# Patient Record
Sex: MALE | Race: BLACK OR AFRICAN AMERICAN | NOT HISPANIC OR LATINO | Employment: FULL TIME | URBAN - METROPOLITAN AREA
[De-identification: names, ages, dates, MRNs, and addresses within clinical notes are randomized per-mention and may not be internally consistent; named-entity substitution may affect disease eponyms.]

---

## 2017-11-06 ENCOUNTER — OFFICE VISIT (OUTPATIENT)
Dept: FAMILY MEDICINE | Facility: CLINIC | Age: 32
End: 2017-11-06
Payer: COMMERCIAL

## 2017-11-06 ENCOUNTER — LAB VISIT (OUTPATIENT)
Dept: LAB | Facility: HOSPITAL | Age: 32
End: 2017-11-06
Attending: FAMILY MEDICINE
Payer: COMMERCIAL

## 2017-11-06 VITALS
SYSTOLIC BLOOD PRESSURE: 110 MMHG | HEIGHT: 68 IN | OXYGEN SATURATION: 97 % | TEMPERATURE: 98 F | DIASTOLIC BLOOD PRESSURE: 80 MMHG | BODY MASS INDEX: 36.86 KG/M2 | WEIGHT: 243.19 LBS | RESPIRATION RATE: 16 BRPM | HEART RATE: 61 BPM

## 2017-11-06 DIAGNOSIS — R39.12 WEAK URINE STREAM: ICD-10-CM

## 2017-11-06 DIAGNOSIS — Z00.00 WELL ADULT EXAM: ICD-10-CM

## 2017-11-06 DIAGNOSIS — Z00.00 WELL ADULT EXAM: Primary | ICD-10-CM

## 2017-11-06 LAB
ALBUMIN SERPL BCP-MCNC: 3.4 G/DL
ALP SERPL-CCNC: 102 U/L
ALT SERPL W/O P-5'-P-CCNC: 19 U/L
ANION GAP SERPL CALC-SCNC: 7 MMOL/L
AST SERPL-CCNC: 34 U/L
BASOPHILS # BLD AUTO: 0.01 K/UL
BASOPHILS NFR BLD: 0.1 %
BILIRUB SERPL-MCNC: 1 MG/DL
BILIRUB SERPL-MCNC: NORMAL MG/DL
BILIRUB UR QL STRIP: NEGATIVE
BLOOD URINE, POC: NORMAL
BUN SERPL-MCNC: 11 MG/DL
CALCIUM SERPL-MCNC: 9 MG/DL
CHLORIDE SERPL-SCNC: 105 MMOL/L
CHOLEST SERPL-MCNC: 176 MG/DL
CHOLEST/HDLC SERPL: 3 {RATIO}
CLARITY UR REFRACT.AUTO: CLEAR
CO2 SERPL-SCNC: 30 MMOL/L
COLOR UR AUTO: YELLOW
COLOR, POC UA: NORMAL
COMPLEXED PSA SERPL-MCNC: 0.58 NG/ML
CREAT SERPL-MCNC: 1.2 MG/DL
DIFFERENTIAL METHOD: NORMAL
EOSINOPHIL # BLD AUTO: 0.1 K/UL
EOSINOPHIL NFR BLD: 0.6 %
ERYTHROCYTE [DISTWIDTH] IN BLOOD BY AUTOMATED COUNT: 12.7 %
EST. GFR  (AFRICAN AMERICAN): >60 ML/MIN/1.73 M^2
EST. GFR  (NON AFRICAN AMERICAN): >60 ML/MIN/1.73 M^2
ESTIMATED AVG GLUCOSE: 97 MG/DL
GLUCOSE SERPL-MCNC: 68 MG/DL
GLUCOSE UR QL STRIP: NEGATIVE
GLUCOSE UR QL STRIP: NORMAL
HBA1C MFR BLD HPLC: 5 %
HCT VFR BLD AUTO: 41.1 %
HDLC SERPL-MCNC: 58 MG/DL
HDLC SERPL: 33 %
HGB BLD-MCNC: 14 G/DL
HGB UR QL STRIP: NEGATIVE
IMM GRANULOCYTES # BLD AUTO: 0.03 K/UL
IMM GRANULOCYTES NFR BLD AUTO: 0.3 %
KETONES UR QL STRIP: NEGATIVE
KETONES UR QL STRIP: NORMAL
LDLC SERPL CALC-MCNC: 110.4 MG/DL
LEUKOCYTE ESTERASE UR QL STRIP: NEGATIVE
LEUKOCYTE ESTERASE URINE, POC: NORMAL
LYMPHOCYTES # BLD AUTO: 3.1 K/UL
LYMPHOCYTES NFR BLD: 32.4 %
MCH RBC QN AUTO: 30.4 PG
MCHC RBC AUTO-ENTMCNC: 34.1 G/DL
MCV RBC AUTO: 89 FL
MONOCYTES # BLD AUTO: 0.7 K/UL
MONOCYTES NFR BLD: 7.7 %
NEUTROPHILS # BLD AUTO: 5.5 K/UL
NEUTROPHILS NFR BLD: 58.9 %
NITRITE UR QL STRIP: NEGATIVE
NITRITE, POC UA: NORMAL
NONHDLC SERPL-MCNC: 118 MG/DL
NRBC BLD-RTO: 0 /100 WBC
PH UR STRIP: 5 [PH] (ref 5–8)
PH, POC UA: 5
PLATELET # BLD AUTO: 313 K/UL
PMV BLD AUTO: 11.2 FL
POTASSIUM SERPL-SCNC: 3.6 MMOL/L
PROT SERPL-MCNC: 7 G/DL
PROT UR QL STRIP: NEGATIVE
PROTEIN, POC: NORMAL
RBC # BLD AUTO: 4.61 M/UL
SODIUM SERPL-SCNC: 142 MMOL/L
SP GR UR STRIP: 1.02 (ref 1–1.03)
SPECIFIC GRAVITY, POC UA: 5
TRIGL SERPL-MCNC: 38 MG/DL
URN SPEC COLLECT METH UR: NORMAL
UROBILINOGEN UR STRIP-ACNC: NEGATIVE EU/DL
UROBILINOGEN, POC UA: NORMAL
WBC # BLD AUTO: 9.43 K/UL

## 2017-11-06 PROCEDURE — 84153 ASSAY OF PSA TOTAL: CPT

## 2017-11-06 PROCEDURE — 99999 PR PBB SHADOW E&M-EST. PATIENT-LVL IV: CPT | Mod: PBBFAC,,, | Performed by: FAMILY MEDICINE

## 2017-11-06 PROCEDURE — 80061 LIPID PANEL: CPT

## 2017-11-06 PROCEDURE — 81003 URINALYSIS AUTO W/O SCOPE: CPT

## 2017-11-06 PROCEDURE — 85025 COMPLETE CBC W/AUTO DIFF WBC: CPT

## 2017-11-06 PROCEDURE — 36415 COLL VENOUS BLD VENIPUNCTURE: CPT | Mod: PO

## 2017-11-06 PROCEDURE — 99395 PREV VISIT EST AGE 18-39: CPT | Mod: 25,S$GLB,, | Performed by: FAMILY MEDICINE

## 2017-11-06 PROCEDURE — 80053 COMPREHEN METABOLIC PANEL: CPT

## 2017-11-06 PROCEDURE — 87086 URINE CULTURE/COLONY COUNT: CPT

## 2017-11-06 PROCEDURE — 83036 HEMOGLOBIN GLYCOSYLATED A1C: CPT

## 2017-11-06 PROCEDURE — 81001 URINALYSIS AUTO W/SCOPE: CPT | Mod: S$GLB,,, | Performed by: FAMILY MEDICINE

## 2017-11-06 NOTE — PROGRESS NOTES
Subjective:       Patient ID: Rodríguez Graves is a 31 y.o. male.    Chief Complaint: Annual Exam    HPI    Annual Physical    Diet: 4/10 - needs to stop eating junk, sweet drinks.     Exercise: none    Immunizations required:    Cancer screenings required:    Other screenings required:    Acute complaints today:    Onset 6+ months ago of a weak urinary stream, and frequent urination. He has urination 2-3 per hour during the day, and wakes up multiple times per night as well. He also has to force the urinate on occasion that occurs about once per week.         Current Outpatient Prescriptions on File Prior to Visit   Medication Sig Dispense Refill    triamcinolone (KENALOG) 0.5 % ointment Apply topically 2 (two) times daily. 30 g 1    trazodone (DESYREL) 50 MG tablet Take 1 tablet (50 mg total) by mouth nightly as needed for Insomnia. 30 tablet 2     No current facility-administered medications on file prior to visit.        Past Medical History:   Diagnosis Date    Anxiety     Depression        Family History   Problem Relation Age of Onset    Hypertension Mother         reports that he has quit smoking. His smoking use included Cigarettes. He started smoking about 8 years ago. He has never used smokeless tobacco. He reports that he drinks alcohol. He reports that he does not use drugs.    Review of Systems   Constitutional: Negative for chills and fever.   HENT: Negative for ear pain and rhinorrhea.    Eyes: Negative for discharge and visual disturbance.   Respiratory: Negative for shortness of breath and wheezing.    Cardiovascular: Negative for chest pain and palpitations.   Gastrointestinal: Negative for diarrhea and vomiting.   Genitourinary: Negative for dysuria and hematuria.   Skin: Negative for rash and wound.   Neurological: Negative for syncope and weakness.   Psychiatric/Behavioral: Negative for dysphoric mood. The patient is not nervous/anxious.        Objective:     Vitals:    11/06/17 1406   BP:  110/80   Pulse: 61   Resp: 16   Temp: 98.2 °F (36.8 °C)        Physical Exam   Constitutional: He is oriented to person, place, and time. He appears well-developed. No distress.   MO   HENT:   Head: Normocephalic and atraumatic.   Right Ear: External ear normal. No foreign bodies. Tympanic membrane is not injected. No middle ear effusion.   Left Ear: External ear normal. No foreign bodies. Tympanic membrane is not injected.  No middle ear effusion.   Mouth/Throat: Oropharynx is clear and moist. No oral lesions. No dental abscesses.   Eyes: Conjunctivae and EOM are normal. Pupils are equal, round, and reactive to light. Right eye exhibits no discharge. Left eye exhibits no discharge.   Cardiovascular: Normal rate, regular rhythm and normal heart sounds.  Exam reveals no gallop and no friction rub.    No murmur heard.  Pulmonary/Chest: Effort normal and breath sounds normal. No respiratory distress. He has no wheezes. He has no rales. He exhibits no tenderness.   Abdominal: Soft. Bowel sounds are normal. He exhibits no distension and no mass. There is no tenderness. There is no rebound and no guarding.   Musculoskeletal: He exhibits no edema.   No gross deformity   Neurological: He is alert and oriented to person, place, and time. No cranial nerve deficit.   Skin: No rash noted. No erythema.   Psychiatric: He has a normal mood and affect. His behavior is normal.   Nursing note and vitals reviewed.      Assessment:       1. Well adult exam    2. Weak urine stream        Plan:       Rodríguez was seen today for annual exam.    Diagnoses and all orders for this visit:    Well adult exam  -     Lipid panel; Future  -     Comprehensive metabolic panel; Future  -     CBC auto differential; Future  -     Hemoglobin A1c; Future  Counseled on age appropriate medical preventative services, including age appropriate cancer screenings, over all nutritional health, need for a consistent exercise regimen and an over all push towards  maintaining a vigorous and active lifestyle.      Counseled on age appropriate vaccines and discussed upcoming health care needs based on age/gender.  Spent time with patient counseling on need for a good patient/doctor relationship moving forward.      Weak urine stream  -     PSA, Screening; Future  -     POCT urinalysis, dipstick or tablet reag  -     Urinalysis  -     Urine culture  -     Ambulatory referral to Urology  Pts sxs are concerning for prostatic pathology. Will obtain labs as above and refer to urology.           Return if symptoms worsen or fail to improve.        Pt verbalized understanding and agreed with our plan.

## 2017-11-07 LAB — BACTERIA UR CULT: NO GROWTH

## 2017-11-14 ENCOUNTER — TELEPHONE (OUTPATIENT)
Dept: FAMILY MEDICINE | Facility: CLINIC | Age: 32
End: 2017-11-14

## 2017-11-14 NOTE — LETTER
November 14, 2017    Rodríguez Star  [address] [place] [address]             [facility]  [address] [facility] [address] [facility] [address]  [place] [address]  Phone: [phone]  Fax: [phone] Dear Norah Star:    Kari we were unable to contact you to schedule your Urology appointment. Please give the referral department a call at [phone].      If you have any questions or concerns, please don't hesitate to call.    Sincerely,        Henny Ball MA

## 2017-12-06 ENCOUNTER — TELEPHONE (OUTPATIENT)
Dept: FAMILY MEDICINE | Facility: CLINIC | Age: 32
End: 2017-12-06

## 2017-12-06 NOTE — TELEPHONE ENCOUNTER
----- Message from Kenia Sepulveda sent at 11/30/2017  2:05 PM CST -----  Contact: wife  BOUBACAR   [phone]  Wife called regarding pt appt. She is yelling that  she has called several times  And said to call her  get this appt scheduled and you still call , She said to call her. Pls call wife [phone]. Thanks......Cassia

## 2017-12-12 ENCOUNTER — OFFICE VISIT (OUTPATIENT)
Dept: UROLOGY | Facility: CLINIC | Age: 32
End: 2017-12-12
Payer: COMMERCIAL

## 2017-12-12 DIAGNOSIS — R39.9 LOWER URINARY TRACT SYMPTOMS (LUTS): Primary | ICD-10-CM

## 2017-12-12 PROCEDURE — 99203 OFFICE O/P NEW LOW 30 MIN: CPT | Mod: S$GLB,,, | Performed by: UROLOGY

## 2017-12-12 PROCEDURE — 99999 PR PBB SHADOW E&M-EST. PATIENT-LVL II: CPT | Mod: PBBFAC,,, | Performed by: UROLOGY

## 2017-12-12 NOTE — LETTER
December 12, 2017      Son Lozoya MD  [address] [facility] [place] [address]           [facility]  [address]  [place] [address]  Phone: [phone]  Fax: [phone]          Patient: Rodríguez Graves   MR Number: [id number]   YOB: 1985   Date of Visit: 12/12/2017       Dear Dr. Son Lozoya:    Thank you for referring Rodríguez Graves to me for evaluation. Attached you will find relevant portions of my assessment and plan of care.    If you have questions, please do not hesitate to call me. I look forward to following Rodríguez Graves along with you.    Sincerely,    Jeremias Snow Jr., MD    Enclosure  CC:  No Recipients    If you would like to receive this communication electronically, please contact [email or web address] or [phone] to request more information on [facility] access.    For providers and/or their staff who would like to refer a patient to [facility], please contact us through our one-stop-shop provider referral line, [facility], at [phone].    If you feel you have received this communication in error or would no longer like to receive these types of communications, please e-mail [email or web address]

## 2017-12-12 NOTE — PROGRESS NOTES
Subjective:       Patient ID: Rodríguez Graves is a 32 y.o. male.    Chief Complaint: Urinary Frequency (poor stream, nocturia) and Dysuria    HPI patient is here with several week history of decreased force and caliber stream and straining to void.  No fever chills nausea vomiting.  No previous urological manipulations.  He feels like he empties his bladder and his urine is clear he denies dysuria to me    Past Medical History:   Diagnosis Date    Anxiety     Depression        Past Surgical History:   Procedure Laterality Date    WISDOM TOOTH EXTRACTION      2 extracted        Family History   Problem Relation Age of Onset    Hypertension Mother        Social History     Social History    Marital status:      Spouse name: N/A    Number of children: N/A    Years of education: N/A     Occupational History    Not on file.     Social History Main Topics    Smoking status: Former Smoker     Types: Cigarettes     Start date: 6/27/2009    Smokeless tobacco: Never Used    Alcohol use Yes      Comment: occacionally    Drug use: No    Sexual activity: Yes     Partners: Female     Birth control/ protection: None      Comment: 11/6/17  with same partner 10 years      Other Topics Concern    Not on file     Social History Narrative    No narrative on file       Allergies:  Patient has no known allergies.    Medications:  No current outpatient prescriptions on file.    Review of Systems   Constitutional: Negative for activity change, appetite change, chills, diaphoresis, fatigue, fever and unexpected weight change.   HENT: Negative for congestion, dental problem, hearing loss, mouth sores, postnasal drip, rhinorrhea, sinus pressure and trouble swallowing.    Eyes: Negative for pain, discharge and itching.   Respiratory: Negative for apnea, cough, choking, chest tightness, shortness of breath and wheezing.    Cardiovascular: Negative for chest pain, palpitations and leg swelling.   Gastrointestinal:  Negative for abdominal distention, abdominal pain, anal bleeding, blood in stool, constipation, diarrhea, nausea, rectal pain and vomiting.   Endocrine: Negative for polydipsia and polyuria.   Genitourinary: Positive for difficulty urinating. Negative for decreased urine volume, discharge, dysuria, enuresis, flank pain, frequency, genital sores, hematuria, penile pain, penile swelling, scrotal swelling, testicular pain and urgency.   Musculoskeletal: Negative for arthralgias, back pain and myalgias.   Skin: Negative for color change, rash and wound.   Neurological: Negative for dizziness, syncope, speech difficulty, light-headedness and headaches.   Hematological: Negative for adenopathy. Does not bruise/bleed easily.   Psychiatric/Behavioral: Negative for behavioral problems, confusion, hallucinations and sleep disturbance.       Objective:      Physical Exam   Constitutional: He appears well-developed.   HENT:   Head: Normocephalic.   Cardiovascular: Normal rate.    Pulmonary/Chest: Effort normal.   Abdominal: Soft.   Genitourinary: Prostate normal.   Genitourinary Comments: Prostate is 20 g benign   Neurological: He is alert.   Skin: Skin is warm.     Psychiatric: He has a normal mood and affect.       Assessment:       1. Lower urinary tract symptoms (LUTS)        Plan:       Rodríguez was seen today for urinary frequency and dysuria.    Diagnoses and all orders for this visit:    Lower urinary tract symptoms (LUTS)    Lower urinary tract symptoms (LUTS)  -     US Retroperitoneal Complete (Kidney and; Future; Expected date: 12/12/2017  -     Cystoscopy; Future

## 2017-12-27 ENCOUNTER — HOSPITAL ENCOUNTER (OUTPATIENT)
Dept: RADIOLOGY | Facility: HOSPITAL | Age: 32
Discharge: HOME | End: 2017-12-27
Attending: UROLOGY
Payer: COMMERCIAL

## 2017-12-27 ENCOUNTER — HOSPITAL ENCOUNTER (OUTPATIENT)
Dept: UROLOGY | Facility: HOSPITAL | Age: 32
Discharge: HOME | End: 2017-12-27
Attending: UROLOGY
Payer: COMMERCIAL

## 2017-12-27 ENCOUNTER — TELEPHONE (OUTPATIENT)
Dept: UROLOGY | Facility: CLINIC | Age: 32
End: 2017-12-27

## 2017-12-27 VITALS
DIASTOLIC BLOOD PRESSURE: 94 MMHG | HEIGHT: 68 IN | HEART RATE: 64 BPM | BODY MASS INDEX: 37.43 KG/M2 | WEIGHT: 246.94 LBS | TEMPERATURE: 98 F | SYSTOLIC BLOOD PRESSURE: 150 MMHG | RESPIRATION RATE: 16 BRPM

## 2017-12-27 DIAGNOSIS — R39.9 LOWER URINARY TRACT SYMPTOMS (LUTS): ICD-10-CM

## 2017-12-27 PROCEDURE — 76770 US EXAM ABDO BACK WALL COMP: CPT | Mod: 26,,, | Performed by: RADIOLOGY

## 2017-12-27 PROCEDURE — 52000 CYSTOURETHROSCOPY: CPT

## 2017-12-27 PROCEDURE — 76770 US EXAM ABDO BACK WALL COMP: CPT | Mod: TC

## 2017-12-27 RX ORDER — LIDOCAINE HYDROCHLORIDE 20 MG/ML
JELLY TOPICAL
Status: COMPLETED | OUTPATIENT
Start: 2017-12-27 | End: 2017-12-27

## 2017-12-27 RX ORDER — CIPROFLOXACIN 500 MG/1
500 TABLET ORAL
Status: COMPLETED | OUTPATIENT
Start: 2017-12-27 | End: 2017-12-27

## 2017-12-27 RX ADMIN — CIPROFLOXACIN 500 MG: 500 TABLET ORAL at 01:12

## 2017-12-27 RX ADMIN — LIDOCAINE HYDROCHLORIDE: 20 JELLY TOPICAL at 12:12

## 2017-12-27 NOTE — PATIENT INSTRUCTIONS
What to Expect After a Cystoscopy  For the next 24-48 hours, you may feel a mild burning when you urinate. This burning is normal and expected. Drink plenty of water to dilute the urine to help relieve the burning sensation. You may also see a small amount of blood in your urine after the procedure.    Unless you are already taking antibiotics, you may be given an antibiotic after the test to prevent infection.    Signs and Symptoms to Report  Call the [facility] at [phone] if you develop any of the following:  · Fever of 101 degrees or higher  · Chills or persistent bleeding  · Inability to urinate

## 2017-12-28 NOTE — OP NOTE
DATE OF PROCEDURE:  12/27/2017    PREOPERATIVE DIAGNOSIS:  Lower urinary tract symptoms.    POSTOPERATIVE DIAGNOSIS:  Urethral stricture.    OPERATION PERFORMED:  Attempted cystoscopy.    PROCEDURE IN DETAIL:  The patient was prepped and draped in supine position.    Renal ultrasound demonstrated normal upper tracts.  He was prepped and draped in   the usual fashion.  Ureteroscopy was performed.  In the anterior urethra, there   was a narrowing to a very small stricture.  This was in the anterior urethra   distally.  I was unable to get by the stricture.  This procedure was terminated.    He was to be scheduled for DVIU under anesthetic.      MECHE  dd: 12/27/2017 13:16:32 (CST)  td: 12/27/2017 18:54:35 (CST)  Doc ID   #[id number]  Job ID #[id number]    CC:

## 2018-01-02 ENCOUNTER — ANESTHESIA EVENT (OUTPATIENT)
Dept: SURGERY | Facility: HOSPITAL | Age: 33
End: 2018-01-02
Payer: COMMERCIAL

## 2018-01-02 NOTE — PRE ADMISSION SCREENING
Anesthesia Assessment: Preoperative EQUATION    Planned Procedure: Procedure(s) (LRB):  URETHROTOMY-DIRECT VISUAL INTERNAL (DVIU) (N/A)  Requested Anesthesia Type:General  Surgeon: Jeremias Snow Jr., MD  Service: Urology  Known or anticipated Date of Surgery:1/19/2018    Surgeon notes: reviewed    Electronic QUestionnaire Assessment completed via nurse interview with patient.        NO AQ    Triage considerations:     The patient has no apparent active cardiac condition (No unstable coronary Syndrome such as severe unstable angina or recent [<1 month] myocardial infarction, decompensated CHF, severe valvular   disease or significant arrhythmia)    Previous anesthesia records:MAC, No problems and Not available    Last PCP note: within 3 months , within [facility]  11/6/17 annual visit  Subspecialty notes: n/a    Other important co-morbidities: obesity     Tests already available:  Available tests,  within 3 months , within [facility] . 11/6/17 A1c, CBC, CMP. NO EKG            Instructions given. (See in Nurse's note)    Optimization:  Anesthesia Preop Clinic Assessment  Indicated-not required for this procedure      Plan:    Testing:  none     Patient  has previously scheduled Medical Appointment:none    Navigation:                Straight Line to surgery.               No tests, anesthesia preop clinic visit, or consult required.

## 2018-01-02 NOTE — ANESTHESIA PREPROCEDURE EVALUATION
Pre Admission Screening  Lauren Guthrie RN      []Hide copied text  []Hover for attribution information  Anesthesia Assessment: Preoperative EQUATION     Planned Procedure: Procedure(s) (LRB):  URETHROTOMY-DIRECT VISUAL INTERNAL (DVIU) (N/A)  Requested Anesthesia Type:General  Surgeon: Jeremias Snow Jr., MD  Service: Urology  Known or anticipated Date of Surgery:1/19/2018     Surgeon notes: reviewed     Electronic QUestionnaire Assessment completed via nurse interview with patient.         NO AQ     Triage considerations:      The patient has no apparent active cardiac condition (No unstable coronary Syndrome such as severe unstable angina or recent [<1 month] myocardial infarction, decompensated CHF, severe valvular   disease or significant arrhythmia)     Previous anesthesia records:MAC, No problems and Not available     Last PCP note: within 3 months , within [facility]  11/6/17 annual visit  Subspecialty notes: n/a     Other important co-morbidities: obesity     Tests already available:  Available tests,  within 3 months , within [facility] . 11/6/17 A1c, CBC, CMP. NO EKG                            Instructions given. (See in Nurse's note)     Optimization:  Anesthesia Preop Clinic Assessment  Indicated-not required for this procedure                Plan:    Testing:  none                           Patient  has previously scheduled Medical Appointment:none     Navigation:                          Straight Line to surgery.                          No tests, anesthesia preop clinic visit, or consult required.                                               Electronically signed by Lauren Guthrie RN at 1/2/2018 12:10 PM        Pre-admit on 1/19/2018            Detailed Report                                                                                                                     01/02/2018  Rodríguez Graves is a 32 y.o., male.    Anesthesia Evaluation         Review of Systems  Anesthesia Hx:  No problems  with previous Anesthesia Tolerated MAC for wisdom teeth removal. Denies Family Hx of Anesthesia complications.  Denies Personal Hx of Anesthesia complications.   Social:  Former Smoker, Social Alcohol Use Quit smoking 2009   Hematology/Oncology:  Hematology Normal   Oncology Normal     EENT/Dental:EENT/Dental Normal   Cardiovascular:    Denies Angina.  Functional Capacity good / => 4 METS    Pulmonary:   Denies Shortness of breath.  Denies Recent URI.  Possible Obstructive Sleep Apnea , (STOP/BANG) Symptoms B - BMI > 35 and G - Gender (Male > Female)    Renal/:  Renal Symptoms/Infections/Stones: retention.  Other Renal / Gu Conditions: (chronic urethral stricture)   Hepatic/GI:  Hepatic/GI Normal    Musculoskeletal:  Musculoskeletal Normal    Neurological:  Neurology Normal    Endocrine:  Metabolic Disorders, Obesity / BMI > 30  Psych:   depression          Physical Exam  General:  Well nourished, Obesity    Airway/Jaw/Neck:  Airway Findings: Mouth Opening: Normal Tongue: Normal  General Airway Assessment: Adult  Mallampati: I  Improves to I with phonation.  TM Distance: Normal, at least 6 cm        Eyes/Ears/Nose:  EYES/EARS/NOSE FINDINGS: Normal   Dental:  DENTAL FINDINGS: Normal   Chest/Lungs:  Chest/Lungs Findings: Clear to auscultation, Normal Respiratory Rate     Heart/Vascular:  Heart Findings: Rate: Normal  Rhythm: Regular Rhythm  Sounds: Normal     Abdomen:  Abdomen Findings: Normal    Musculoskeletal:  Musculoskeletal Findings: Normal   Skin:  Skin Findings: Normal    Mental Status:  Mental Status Findings:  Cooperative, Alert and Oriented         Anesthesia Plan  Type of Anesthesia, risks & benefits discussed:  Anesthesia Type:  general  Patient's Preference:   Intra-op Monitoring Plan: standard ASA monitors  Intra-op Monitoring Plan Comments:   Post Op Pain Control Plan: per primary service following discharge from PACU  Post Op Pain Control Plan Comments:   Induction:   IV  Beta Blocker:  Patient is  not currently on a Beta-Blocker (No further documentation required).       Informed Consent: Patient understands risks and agrees with Anesthesia plan.  Questions answered. Anesthesia consent signed with patient.  ASA Score: 2     Day of Surgery Review of History & Physical:    H&P update referred to the surgeon.         Ready For Surgery From Anesthesia Perspective.

## 2018-01-17 ENCOUNTER — TELEPHONE (OUTPATIENT)
Dept: UROLOGY | Facility: CLINIC | Age: 33
End: 2018-01-17

## 2018-01-17 NOTE — TELEPHONE ENCOUNTER
Called pt to confirm 11:45am arrival time for procedure. Gave pt NPO instructions and gave pt opportunity to ask questions. Pt verbalized understanding.

## 2018-01-19 ENCOUNTER — ANESTHESIA (OUTPATIENT)
Dept: SURGERY | Facility: HOSPITAL | Age: 33
End: 2018-01-19
Payer: COMMERCIAL

## 2018-01-19 ENCOUNTER — HOSPITAL ENCOUNTER (OUTPATIENT)
Facility: HOSPITAL | Age: 33
Discharge: HOME | End: 2018-01-19
Attending: UROLOGY | Admitting: UROLOGY
Payer: COMMERCIAL

## 2018-01-19 VITALS
HEIGHT: 68 IN | HEART RATE: 60 BPM | RESPIRATION RATE: 18 BRPM | TEMPERATURE: 98 F | OXYGEN SATURATION: 99 % | DIASTOLIC BLOOD PRESSURE: 75 MMHG | SYSTOLIC BLOOD PRESSURE: 115 MMHG | WEIGHT: 226 LBS | BODY MASS INDEX: 34.25 KG/M2

## 2018-01-19 DIAGNOSIS — N35.919 URETHRAL STRICTURE: ICD-10-CM

## 2018-01-19 DIAGNOSIS — N35.9 URETHRAL STRICTURE, UNSPECIFIED STRICTURE TYPE: ICD-10-CM

## 2018-01-19 PROCEDURE — 52276 CYSTOSCOPY AND TREATMENT: CPT | Mod: ,,, | Performed by: UROLOGY

## 2018-01-19 PROCEDURE — 63600175 PHARM REV CODE 636 W HCPCS: Performed by: NURSE ANESTHETIST, CERTIFIED REGISTERED

## 2018-01-19 PROCEDURE — C1769 GUIDE WIRE: HCPCS | Performed by: UROLOGY

## 2018-01-19 PROCEDURE — 25000003 PHARM REV CODE 250: Performed by: UROLOGY

## 2018-01-19 PROCEDURE — 36000707: Performed by: UROLOGY

## 2018-01-19 PROCEDURE — 27201423 OPTIME MED/SURG SUP & DEVICES STERILE SUPPLY: Performed by: UROLOGY

## 2018-01-19 PROCEDURE — 71000033 HC RECOVERY, INTIAL HOUR: Performed by: UROLOGY

## 2018-01-19 PROCEDURE — 27200651 HC AIRWAY, LMA: Performed by: NURSE ANESTHETIST, CERTIFIED REGISTERED

## 2018-01-19 PROCEDURE — D9220A PRA ANESTHESIA: Mod: ANES,,, | Performed by: ANESTHESIOLOGY

## 2018-01-19 PROCEDURE — 36000706: Performed by: UROLOGY

## 2018-01-19 PROCEDURE — 37000008 HC ANESTHESIA 1ST 15 MINUTES: Performed by: UROLOGY

## 2018-01-19 PROCEDURE — D9220A PRA ANESTHESIA: Mod: CRNA,,, | Performed by: NURSE ANESTHETIST, CERTIFIED REGISTERED

## 2018-01-19 PROCEDURE — 63600175 PHARM REV CODE 636 W HCPCS: Performed by: UROLOGY

## 2018-01-19 PROCEDURE — 25000003 PHARM REV CODE 250: Performed by: NURSE ANESTHETIST, CERTIFIED REGISTERED

## 2018-01-19 PROCEDURE — 37000009 HC ANESTHESIA EA ADD 15 MINS: Performed by: UROLOGY

## 2018-01-19 PROCEDURE — 71000015 HC POSTOP RECOV 1ST HR: Performed by: UROLOGY

## 2018-01-19 RX ORDER — OXYCODONE AND ACETAMINOPHEN 5; 325 MG/1; MG/1
1 TABLET ORAL EVERY 4 HOURS PRN
Qty: 31 TABLET | Refills: 0 | Status: SHIPPED | OUTPATIENT
Start: 2018-01-19 | End: 2018-10-03

## 2018-01-19 RX ORDER — OXYCODONE AND ACETAMINOPHEN 5; 325 MG/1; MG/1
1 TABLET ORAL EVERY 4 HOURS PRN
Status: DISCONTINUED | OUTPATIENT
Start: 2018-01-19 | End: 2018-01-19 | Stop reason: HOSPADM

## 2018-01-19 RX ORDER — MIDAZOLAM HYDROCHLORIDE 1 MG/ML
INJECTION, SOLUTION INTRAMUSCULAR; INTRAVENOUS
Status: DISCONTINUED | OUTPATIENT
Start: 2018-01-19 | End: 2018-01-19

## 2018-01-19 RX ORDER — SUCCINYLCHOLINE CHLORIDE 20 MG/ML
INJECTION INTRAMUSCULAR; INTRAVENOUS
Status: DISCONTINUED | OUTPATIENT
Start: 2018-01-19 | End: 2018-01-19

## 2018-01-19 RX ORDER — POLYETHYLENE GLYCOL 3350 17 G/17G
17 POWDER, FOR SOLUTION ORAL DAILY
Qty: 30 PACKET | Refills: 0 | Status: SHIPPED | OUTPATIENT
Start: 2018-01-19 | End: 2018-10-03

## 2018-01-19 RX ORDER — FENTANYL CITRATE 50 UG/ML
INJECTION, SOLUTION INTRAMUSCULAR; INTRAVENOUS
Status: DISCONTINUED | OUTPATIENT
Start: 2018-01-19 | End: 2018-01-19

## 2018-01-19 RX ORDER — SODIUM CHLORIDE 9 MG/ML
INJECTION, SOLUTION INTRAVENOUS CONTINUOUS
Status: DISCONTINUED | OUTPATIENT
Start: 2018-01-19 | End: 2018-01-19 | Stop reason: HOSPADM

## 2018-01-19 RX ORDER — CEFAZOLIN SODIUM 1 G/3ML
2 INJECTION, POWDER, FOR SOLUTION INTRAMUSCULAR; INTRAVENOUS
Status: COMPLETED | OUTPATIENT
Start: 2018-01-19 | End: 2018-01-19

## 2018-01-19 RX ORDER — FENTANYL CITRATE 50 UG/ML
25 INJECTION, SOLUTION INTRAMUSCULAR; INTRAVENOUS EVERY 5 MIN PRN
Status: DISCONTINUED | OUTPATIENT
Start: 2018-01-19 | End: 2018-01-19

## 2018-01-19 RX ORDER — SODIUM CHLORIDE 0.9 % (FLUSH) 0.9 %
3 SYRINGE (ML) INJECTION
Status: DISCONTINUED | OUTPATIENT
Start: 2018-01-19 | End: 2018-01-19 | Stop reason: HOSPADM

## 2018-01-19 RX ORDER — LIDOCAINE HCL/PF 100 MG/5ML
SYRINGE (ML) INTRAVENOUS
Status: DISCONTINUED | OUTPATIENT
Start: 2018-01-19 | End: 2018-01-19

## 2018-01-19 RX ORDER — HYOSCYAMINE SULFATE 0.12 MG/1
0.12 TABLET SUBLINGUAL EVERY 4 HOURS PRN
Qty: 20 TABLET | Refills: 1 | Status: SHIPPED | OUTPATIENT
Start: 2018-01-19 | End: 2018-10-03

## 2018-01-19 RX ORDER — PHENYLEPHRINE HYDROCHLORIDE 10 MG/ML
INJECTION INTRAVENOUS
Status: DISCONTINUED | OUTPATIENT
Start: 2018-01-19 | End: 2018-01-19

## 2018-01-19 RX ORDER — PROPOFOL 10 MG/ML
VIAL (ML) INTRAVENOUS
Status: DISCONTINUED | OUTPATIENT
Start: 2018-01-19 | End: 2018-01-19

## 2018-01-19 RX ORDER — FENTANYL CITRATE 50 UG/ML
25 INJECTION, SOLUTION INTRAMUSCULAR; INTRAVENOUS EVERY 5 MIN PRN
Status: DISCONTINUED | OUTPATIENT
Start: 2018-01-19 | End: 2018-01-19 | Stop reason: HOSPADM

## 2018-01-19 RX ORDER — LIDOCAINE HYDROCHLORIDE 10 MG/ML
1 INJECTION, SOLUTION EPIDURAL; INFILTRATION; INTRACAUDAL; PERINEURAL ONCE
Status: COMPLETED | OUTPATIENT
Start: 2018-01-19 | End: 2018-01-19

## 2018-01-19 RX ADMIN — PROPOFOL 200 MG: 10 INJECTION, EMULSION INTRAVENOUS at 12:01

## 2018-01-19 RX ADMIN — LIDOCAINE HYDROCHLORIDE 10 MG: 10 INJECTION, SOLUTION EPIDURAL; INFILTRATION; INTRACAUDAL; PERINEURAL at 10:01

## 2018-01-19 RX ADMIN — OXYCODONE HYDROCHLORIDE AND ACETAMINOPHEN 1 TABLET: 5; 325 TABLET ORAL at 01:01

## 2018-01-19 RX ADMIN — PHENYLEPHRINE HYDROCHLORIDE 100 MCG: 10 INJECTION INTRAVENOUS at 12:01

## 2018-01-19 RX ADMIN — MIDAZOLAM HYDROCHLORIDE 2 MG: 1 INJECTION, SOLUTION INTRAMUSCULAR; INTRAVENOUS at 12:01

## 2018-01-19 RX ADMIN — PROPOFOL 100 MG: 10 INJECTION, EMULSION INTRAVENOUS at 12:01

## 2018-01-19 RX ADMIN — SODIUM CHLORIDE: 0.9 INJECTION, SOLUTION INTRAVENOUS at 10:01

## 2018-01-19 RX ADMIN — SODIUM CHLORIDE, SODIUM GLUCONATE, SODIUM ACETATE, POTASSIUM CHLORIDE, MAGNESIUM CHLORIDE, SODIUM PHOSPHATE, DIBASIC, AND POTASSIUM PHOSPHATE: .53; .5; .37; .037; .03; .012; .00082 INJECTION, SOLUTION INTRAVENOUS at 01:01

## 2018-01-19 RX ADMIN — FENTANYL CITRATE 100 MCG: 50 INJECTION, SOLUTION INTRAMUSCULAR; INTRAVENOUS at 12:01

## 2018-01-19 RX ADMIN — CEFAZOLIN 2 G: 330 INJECTION, POWDER, FOR SOLUTION INTRAMUSCULAR; INTRAVENOUS at 12:01

## 2018-01-19 RX ADMIN — LIDOCAINE HYDROCHLORIDE 60 MG: 20 INJECTION, SOLUTION INTRAVENOUS at 12:01

## 2018-01-19 RX ADMIN — SUCCINYLCHOLINE CHLORIDE 100 MG: 20 INJECTION, SOLUTION INTRAMUSCULAR; INTRAVENOUS at 12:01

## 2018-01-19 NOTE — OP NOTE
[facility]  Operative Note    Date: 01/19/2018    Pre-Op Diagnosis: urethral stricture    Post-Op Diagnosis: same    Procedure(s) Performed:   1.  Cystoscopy with DVIU  2.  Fluoro < 1 hour    Specimen(s): none    Staff Surgeon: Jeremias Snow MD    Assistant Surgeon: Saturnino Bhandari MD    Anesthesia: General endotracheal anesthesia    Indications: Rodríguez Graves is a 32 y.o. male with urethral stricture presenting for endoscopic management.      Findings: tight stricture in bulbar urethra    Estimated Blood Loss: min    Drains: 20 Fr hernandez catheter    Procedure in Detail:  After risks, benefits and possible complications of cystoscopy were explained, the patient elected to undergo the procedure and informed consent was obtained. All questions were answered in the zoey-operative area. The patient was transferred to the cystoscopy suite and placed in the supine position.  SCDs were applied and working.  Anesthesia was administered.  Once the patient was adequately sedated, he was placed in the dorsal lithotomy position and prepped and draped in the usual sterile fashion.  Time out was performed, zoey-procedural antibiotics were confirmed.     A urethrotome in a 22 Fr sheath was introduced into the urethra. A stricture was seen at the bulbar urethra.  The urethrotome was used to cut at the 12 o'clock position until bleeding was visualized.  Scope was then advanced and another stricture was encountered and this was incised in the same fashion.    The scope was then passed through the rest of the urethra and into the bladder.  The right and left ureteral orifices were identified in the normal anatomic position and were seen effluxing clear urine.  Formal cystoscopy was performed which revealed no masses or lesions suspicious for malignancy, no trabeculations, no bladder stones and no bladder diverticuli.      The bladder was drained, and the patient was removed from lithotomy. 20 fr hernandez catheter  was placed during the case.    The patient tolerated the procedure well and was transferred to recovery in stable condition.    Disposition:  The patient will follow up with Dr. Snow in 3 weeks.  The patient was given prescriptions for percocet, miralax.      Saturnino Bhandari MD

## 2018-01-19 NOTE — DISCHARGE SUMMARY
[facility]  Discharge Note  Short Stay    Admit Date: 1/19/2018    Discharge Date and Time: 1/19/18     Attending Physician: Jeremias Snow Jr., MD     Discharge Provider: Saturnino Bhandari    Diagnoses:  Active Hospital Problems    Diagnosis  POA    Urethral stricture [N35.9]  Yes      Resolved Hospital Problems    Diagnosis Date Resolved POA   No resolved problems to display.       Discharged Condition: good    Hospital Course: Patient was admitted for an outpatient procedure and tolerated the procedure well with no complications.    Final Diagnoses: Same as principal problem.    Disposition: Home or Self Care    Follow up/Patient Instructions:    Medications:   Reconciled Home Medications:   Current Discharge Medication List      START taking these medications    Details   hyoscyamine (LEVSIN/SL) 0.125 mg Subl Place 1 tablet (0.125 mg total) under the tongue every 4 (four) hours as needed (bladder spasms).  Qty: 20 tablet, Refills: 1      oxyCODONE-acetaminophen (PERCOCET) 5-325 mg per tablet Take 1 tablet by mouth every 4 (four) hours as needed.  Qty: 31 tablet, Refills: 0      polyethylene glycol (GLYCOLAX) 17 gram PwPk Take 17 g by mouth once daily.  Qty: 30 packet, Refills: 0             Discharge Procedure Orders  Activity as tolerated     Notify your health care provider if you experience any of the following:  temperature >100.4     Notify your health care provider if you experience any of the following:  persistent nausea and vomiting or diarrhea     Notify your health care provider if you experience any of the following:  redness, tenderness, or signs of infection (pain, swelling, redness, odor or green/yellow discharge around incision site)     Notify your health care provider if you experience any of the following:  severe uncontrolled pain     Notify your health care provider if you experience any of the following:  difficulty breathing or increased cough     Notify your health care  provider if you experience any of the following:  severe persistent headache     Notify your health care provider if you experience any of the following:  worsening rash     Notify your health care provider if you experience any of the following:  increased confusion or weakness     Notify your health care provider if you experience any of the following:  persistent dizziness, light-headedness, or visual disturbances     No dressing needed       Follow-up Information     [facility] 4th Floor On 1/24/2018.    Specialty:  Urology  Contact information:  [address] [facility]  [facility] [address] [phone]  Additional information:  [facility] - 4th Floor                 Discharge Procedure Orders (must include Diet, Follow-up, Activity):    Discharge Procedure Orders (must include Diet, Follow-up, Activity)  Activity as tolerated     Notify your health care provider if you experience any of the following:  temperature >100.4     Notify your health care provider if you experience any of the following:  persistent nausea and vomiting or diarrhea     Notify your health care provider if you experience any of the following:  redness, tenderness, or signs of infection (pain, swelling, redness, odor or green/yellow discharge around incision site)     Notify your health care provider if you experience any of the following:  severe uncontrolled pain     Notify your health care provider if you experience any of the following:  difficulty breathing or increased cough     Notify your health care provider if you experience any of the following:  severe persistent headache     Notify your health care provider if you experience any of the following:  worsening rash     Notify your health care provider if you experience any of the following:  increased confusion or weakness     Notify your health care provider if you experience any of the following:  persistent dizziness, light-headedness, or visual disturbances      No dressing needed

## 2018-01-19 NOTE — PLAN OF CARE
Problem: Patient Care Overview  Goal: Plan of Care Review  Outcome: Outcome(s) achieved Date Met: 01/19/18    Discharge instructions and prescription given to patient and spouse. Verbalized understanding. Patient stable, tolerating fluids. No complaints at this time. Patient adequate for discharge.

## 2018-01-19 NOTE — TRANSFER OF CARE
"Anesthesia Transfer of Care Note    Patient: Rodríguez Graves    Procedure(s) Performed: Procedure(s) (LRB):  URETHROTOMY-DIRECT VISUAL INTERNAL (DVIU) (N/A)  CYSTOSCOPY (N/A)    Patient location: PACU    Anesthesia Type: general    Transport from OR: Transported from OR on 6-10 L/min O2 by face mask with adequate spontaneous ventilation    Post pain: adequate analgesia    Post assessment: no apparent anesthetic complications and tolerated procedure well    Post vital signs: stable    Level of consciousness: alert, oriented and awake    Nausea/Vomiting: no nausea/vomiting    Complications: none    Transfer of care protocol was followed      Last vitals:   Visit Vitals  /82 (BP Location: Right arm, Patient Position: Lying)   Pulse 62   Temp 37.3 °C (99.1 °F) (Temporal)   Resp 18   Ht 5' 8" (1.727 m)   Wt 102.5 kg (226 lb)   SpO2 100%   BMI 34.36 kg/m²     "

## 2018-01-19 NOTE — DISCHARGE INSTRUCTIONS
Home Care Instructions  SELF-CARE OF  INDWELLING CATHETERS    PATIENT EDUCATION:  1. Wash hands before and after handling the catheter.  2. Wash around urinary opening daily, taking care to avoid pulling on the catheter during cleansing.  3. Drink 8-12 glasses of fluids daily; increase fluid intake if urine becomes dark and concentrated.  4. Wipe all connecting junctions with alcohol or betadine before changing from leg-bag drainage to  overnight bag drainage.  5. Keep the drainage bag at a lower level than the bladder; do not place the bag on your chair.  6. Avoid letting the bag lay on its side as urine may flow back into the drainage tube.  7. Usually the catheter is not changed except when blocked or a malfunction occurs.  **MALE**  If you are uncircumcised, pull skin back over glans (head) of penis. Cleanse from the catheter outward to  include entire glans. After cleansing return foreskin to its normal position. Continue with cleansing the rest  of your genitalia, without returning to this glans area.  BATHING: You may take a shower. Do not soak in a tub.  FOR EMERGENCIES:  If any unusual problems or difficulties occur, contact Dr. Snow  or the resident at [phone] (page ) or at the Clinic office.

## 2018-01-19 NOTE — BRIEF OP NOTE
[facility]  Brief Operative Note     SUMMARY     Surgery Date: 1/19/2018     Surgeon(s) and Role:     * Jeremias Snow Jr., MD - Primary     * Saturnino Bhandari MD - Resident - Assisting        Pre-op Diagnosis:  Chronic urethral stricture [N35.9]    Post-op Diagnosis:  Post-Op Diagnosis Codes:     * Chronic urethral stricture [N35.9]    Procedure(s) (LRB):  URETHROTOMY-DIRECT VISUAL INTERNAL (DVIU) (N/A)  CYSTOSCOPY (N/A)    Anesthesia: General    Description of the findings of the procedure: see op note    Findings/Key Components: see op note    Estimated Blood Loss: * No values recorded between 1/19/2018  1:04 PM and 1/19/2018  1:27 PM *         Specimens:   Specimen (12h ago through future)    None          Discharge Note    SUMMARY     Admit Date: 1/19/2018    Discharge Date and Time:  01/19/2018 1:36 PM    Hospital Course (synopsis of major diagnoses, care, treatment, and services provided during the course of the hospital stay):  Patient admitted for above procedure. Tolerated the procedure well. Discharged home in stable condition.      Final Diagnosis: Post-Op Diagnosis Codes:     * Chronic urethral stricture [N35.9]    Disposition: Home or Self Care    Follow Up/Patient Instructions:     Medications:  Reconciled Home Medications:   Current Discharge Medication List      START taking these medications    Details   hyoscyamine (LEVSIN/SL) 0.125 mg Subl Place 1 tablet (0.125 mg total) under the tongue every 4 (four) hours as needed (bladder spasms).  Qty: 20 tablet, Refills: 1      oxyCODONE-acetaminophen (PERCOCET) 5-325 mg per tablet Take 1 tablet by mouth every 4 (four) hours as needed.  Qty: 31 tablet, Refills: 0      polyethylene glycol (GLYCOLAX) 17 gram PwPk Take 17 g by mouth once daily.  Qty: 30 packet, Refills: 0             Discharge Procedure Orders  Activity as tolerated     Notify your health care provider if you experience any of the following:  temperature >100.4      Notify your health care provider if you experience any of the following:  persistent nausea and vomiting or diarrhea     Notify your health care provider if you experience any of the following:  redness, tenderness, or signs of infection (pain, swelling, redness, odor or green/yellow discharge around incision site)     Notify your health care provider if you experience any of the following:  severe uncontrolled pain     Notify your health care provider if you experience any of the following:  difficulty breathing or increased cough     Notify your health care provider if you experience any of the following:  severe persistent headache     Notify your health care provider if you experience any of the following:  worsening rash     Notify your health care provider if you experience any of the following:  increased confusion or weakness     Notify your health care provider if you experience any of the following:  persistent dizziness, light-headedness, or visual disturbances     No dressing needed       Follow-up Information     [facility] - [facility] 4th Floor On 1/24/2018.    Specialty:  Urology  Contact information:  [address]  [facility] [id number] [phone]  Additional information:  [facility] 4th Floor

## 2018-01-19 NOTE — H&P
Subjective:       Patient ID: Rodríguez Graves is a 32 y.o. male.     Chief Complaint: Urinary Frequency (poor stream, nocturia) and Dysuria     HPI patient is here with several week history of decreased force and caliber stream and straining to void.  No fever chills nausea vomiting.  No previous urological manipulations.  He feels like he empties his bladder and his urine is clear he denies dysuria to me          Past Medical History:   Diagnosis Date    Anxiety      Depression                 Past Surgical History:   Procedure Laterality Date    WISDOM TOOTH EXTRACTION         2 extracted                Family History   Problem Relation Age of Onset    Hypertension Mother           Social History   Social History            Social History    Marital status:        Spouse name: N/A    Number of children: N/A    Years of education: N/A      Occupational History    Not on file.             Social History Main Topics    Smoking status: Former Smoker       Types: Cigarettes       Start date: 6/27/2009    Smokeless tobacco: Never Used    Alcohol use Yes         Comment: occacionally    Drug use: No    Sexual activity: Yes       Partners: Female       Birth control/ protection: None         Comment: 11/6/17  with same partner 10 years            Other Topics Concern    Not on file          Social History Narrative    No narrative on file            Allergies:  Patient has no known allergies.     Medications:  No current outpatient prescriptions on file.     Review of Systems   Constitutional: Negative for activity change, appetite change, chills, diaphoresis, fatigue, fever and unexpected weight change.   HENT: Negative for congestion, dental problem, hearing loss, mouth sores, postnasal drip, rhinorrhea, sinus pressure and trouble swallowing.    Eyes: Negative for pain, discharge and itching.   Respiratory: Negative for apnea, cough, choking, chest tightness, shortness of breath and wheezing.     Cardiovascular: Negative for chest pain, palpitations and leg swelling.   Gastrointestinal: Negative for abdominal distention, abdominal pain, anal bleeding, blood in stool, constipation, diarrhea, nausea, rectal pain and vomiting.   Endocrine: Negative for polydipsia and polyuria.   Genitourinary: Positive for difficulty urinating. Negative for decreased urine volume, discharge, dysuria, enuresis, flank pain, frequency, genital sores, hematuria, penile pain, penile swelling, scrotal swelling, testicular pain and urgency.   Musculoskeletal: Negative for arthralgias, back pain and myalgias.   Skin: Negative for color change, rash and wound.   Neurological: Negative for dizziness, syncope, speech difficulty, light-headedness and headaches.   Hematological: Negative for adenopathy. Does not bruise/bleed easily.   Psychiatric/Behavioral: Negative for behavioral problems, confusion, hallucinations and sleep disturbance.       Objective:   Physical Exam   Constitutional: He appears well-developed.   HENT:   Head: Normocephalic.   Cardiovascular: Normal rate.    Pulmonary/Chest: Effort normal.   Abdominal: Soft.   Genitourinary: Prostate normal.   Genitourinary Comments: Prostate is 20 g benign   Neurological: He is alert.   Skin: Skin is warm.     Psychiatric: He has a normal mood and affect.       Assessment:       1. Lower urinary tract symptoms (LUTS)        Plan:   - To OR today for cysto DVIU other indicated procedures.     Saturnino Bhandari MD PGY-4  Urology  [phone]

## 2018-01-22 NOTE — ANESTHESIA POSTPROCEDURE EVALUATION
"Anesthesia Post Evaluation    Patient: Rodríguez Graves    Procedure(s) Performed: Procedure(s) (LRB):  URETHROTOMY-DIRECT VISUAL INTERNAL (DVIU) (N/A)  CYSTOSCOPY (N/A)    Final Anesthesia Type: general  Patient location during evaluation: PACU  Patient participation: Yes- Able to Participate  Level of consciousness: awake and alert, awake and oriented  Post-procedure vital signs: reviewed and stable  Pain management: adequate  Airway patency: patent  PONV status at discharge: No PONV  Anesthetic complications: no      Cardiovascular status: blood pressure returned to baseline, stable and hemodynamically stable  Respiratory status: unassisted, spontaneous ventilation and room air  Hydration status: euvolemic  Follow-up not needed.        Visit Vitals  /75   Pulse 60   Temp 36.8 °C (98.2 °F) (Temporal)   Resp 18   Ht 5' 8" (1.727 m)   Wt 102.5 kg (226 lb)   SpO2 99%   BMI 34.36 kg/m²       Pain/Sal Score: No Data Recorded      "

## 2018-01-24 ENCOUNTER — OFFICE VISIT (OUTPATIENT)
Dept: UROLOGY | Facility: CLINIC | Age: 33
End: 2018-01-24
Payer: COMMERCIAL

## 2018-01-24 VITALS
SYSTOLIC BLOOD PRESSURE: 135 MMHG | BODY MASS INDEX: 34.86 KG/M2 | WEIGHT: 229.25 LBS | DIASTOLIC BLOOD PRESSURE: 78 MMHG | HEART RATE: 65 BPM

## 2018-01-24 DIAGNOSIS — N35.9 URETHRAL STRICTURE, UNSPECIFIED STRICTURE TYPE: Primary | ICD-10-CM

## 2018-01-24 DIAGNOSIS — Z46.6 ENCOUNTER FOR FOLEY CATHETER REMOVAL: ICD-10-CM

## 2018-01-24 PROCEDURE — 51700 IRRIGATION OF BLADDER: CPT | Mod: S$GLB,,, | Performed by: PHYSICIAN ASSISTANT

## 2018-01-24 PROCEDURE — 99999 PR PBB SHADOW E&M-EST. PATIENT-LVL III: CPT | Mod: PBBFAC,,, | Performed by: PHYSICIAN ASSISTANT

## 2018-01-24 PROCEDURE — 99213 OFFICE O/P EST LOW 20 MIN: CPT | Mod: 25,S$GLB,, | Performed by: PHYSICIAN ASSISTANT

## 2018-01-24 NOTE — PROGRESS NOTES
CHIEF COMPLAINT:    Mr. Graves is a 32 y.o. male presenting for voiding trial  PRESENTING ILLNESS:    Rodríguez Graves is a 32 y.o. male who presents for voiding trial.   Patient was underwent Cystoscopy with DVIU on 1/19/17 due to urethral stricture.    Findings: tight stricture in bulbar urethra    His catheter has been draining well.  He reports yellow urine in bag.   He is having regular bowel movements.      REVIEW OF SYSTEMS:    Constitutional: Negative for fever and chills.   HENT: Negative for hearing loss.   Eyes: Negative for visual disturbance.   Respiratory: Negative for shortness of breath.   Cardiovascular: Negative for chest pain.   Gastrointestinal: Negative for vomiting, and constipation.   Genitourinary:  Positivefor difficulty urinating.    Neurological: Negative for dizziness.   Hematological: Does not bruise/bleed easily.   Psychiatric/Behavioral: Negative for confusion.       PATIENT HISTORY:    Past Medical History:   Diagnosis Date    Anxiety     Depression        Past Surgical History:   Procedure Laterality Date    WISDOM TOOTH EXTRACTION      2 extracted        Family History   Problem Relation Age of Onset    Hypertension Mother        Social History     Social History    Marital status:      Spouse name: N/A    Number of children: N/A    Years of education: N/A     Occupational History    Not on file.     Social History Main Topics    Smoking status: Former Smoker     Types: Cigarettes     Start date: 6/27/2009    Smokeless tobacco: Never Used    Alcohol use Yes      Comment: occacionally    Drug use: No    Sexual activity: Yes     Partners: Female     Birth control/ protection: None      Comment: 11/6/17  with same partner 10 years      Other Topics Concern    Not on file     Social History Narrative    No narrative on file       Allergies:  Patient has no known allergies.    Medications:    Current Outpatient Prescriptions:     hyoscyamine (LEVSIN/SL) 0.125 mg  Subl, Place 1 tablet (0.125 mg total) under the tongue every 4 (four) hours as needed (bladder spasms)., Disp: 20 tablet, Rfl: 1    oxyCODONE-acetaminophen (PERCOCET) 5-325 mg per tablet, Take 1 tablet by mouth every 4 (four) hours as needed., Disp: 31 tablet, Rfl: 0    polyethylene glycol (GLYCOLAX) 17 gram PwPk, Take 17 g by mouth once daily., Disp: 30 packet, Rfl: 0    PHYSICAL EXAMINATION:    Constitutional: He appears well-developed and well-nourished.  He is in no apparent distress.    Eyes: No scleral icterus noted bilaterally.  No discharge noted bilaterally.      Cardiovascular: Normal rate.  No pitting edema noted in lower extremities bilaterally    Pulmonary/Chest: Effort normal. No respiratory distress.     Abdominal:  He exhibits no distension.  There is no CVA tenderness.     Lymphadenopathy:        Right: No supraclavicular adenopathy present.        Left: No supraclavicular adenopathy present.     Neurological: He is alert and oriented to person, place, and time.     Skin: Skin is warm and dry.     Psych: Cooperative with normal affect.    Physical Exam      LABS:      Lab Results   Component Value Date    PSA 0.58 11/06/2017       IMPRESSION:    No diagnosis found.      PLAN:    Voiding trial performed by Misbah.  120ml of sterile water was instilled into bladder.  Hernandez catheter was removed. Patient urinated 160ml without difficulty.    Voiding trial passed  I instructed patient to return to clinic or emergency department (if after clinic hours) to have hernandez catheter put back in if unable to urinate within 5 hours of hernandez catheter removal or starts to experience bladder pressure/pain, decrease flow, straining/difficulty urinating.    Patient voiced understanding.     He will follow up in 3 weeks as stated in op note.       Yudi Cooper PA-C

## 2018-02-14 ENCOUNTER — OFFICE VISIT (OUTPATIENT)
Dept: UROLOGY | Facility: CLINIC | Age: 33
End: 2018-02-14
Payer: COMMERCIAL

## 2018-02-14 VITALS
BODY MASS INDEX: 36.05 KG/M2 | HEART RATE: 59 BPM | SYSTOLIC BLOOD PRESSURE: 111 MMHG | WEIGHT: 237.88 LBS | DIASTOLIC BLOOD PRESSURE: 71 MMHG | HEIGHT: 68 IN

## 2018-02-14 DIAGNOSIS — N35.9 URETHRAL STRICTURE, UNSPECIFIED STRICTURE TYPE: Primary | ICD-10-CM

## 2018-02-14 DIAGNOSIS — Z98.890 POST-OPERATIVE STATE: ICD-10-CM

## 2018-02-14 PROCEDURE — 99024 POSTOP FOLLOW-UP VISIT: CPT | Mod: S$GLB,,, | Performed by: UROLOGY

## 2018-02-14 PROCEDURE — 99999 PR PBB SHADOW E&M-EST. PATIENT-LVL III: CPT | Mod: PBBFAC,,, | Performed by: UROLOGY

## 2018-02-14 NOTE — PROGRESS NOTES
History & Physical  Urology      SUBJECTIVE:     Chief Complaint/Reason for Admission: urethral stricture    History of Present Illness: Rodríguez Graves is a 32 y.o. male with history of urethral stricture. He is s/p cystoscopy and DVIU for two short bulbar urethral strictures on 1/19/18. He had his catheter removed 5 days later and passed a voiding trial. He has not had issues voiding since catheter removal. Says his stream is strong and he feels he is emptying well. No dysuria or hematuria. He does mention pain with erections and intercourse but this is improving. He has not been taught how to catheterize himself.       Current Outpatient Prescriptions on File Prior to Visit   Medication Sig    hyoscyamine (LEVSIN/SL) 0.125 mg Subl Place 1 tablet (0.125 mg total) under the tongue every 4 (four) hours as needed (bladder spasms).    oxyCODONE-acetaminophen (PERCOCET) 5-325 mg per tablet Take 1 tablet by mouth every 4 (four) hours as needed.    polyethylene glycol (GLYCOLAX) 17 gram PwPk Take 17 g by mouth once daily.     No current facility-administered medications on file prior to visit.        Review of patient's allergies indicates:  No Known Allergies    Past Medical History:   Diagnosis Date    Anxiety     Depression      Past Surgical History:   Procedure Laterality Date    WISDOM TOOTH EXTRACTION      2 extracted      Family History   Problem Relation Age of Onset    Hypertension Mother      Social History   Substance Use Topics    Smoking status: Former Smoker     Types: Cigarettes     Start date: 6/27/2009    Smokeless tobacco: Never Used    Alcohol use Yes      Comment: occacionally        Review of Systems   Constitutional: Negative for activity change, appetite change, chills and fever.   HENT: Negative.    Eyes: Negative.    Respiratory: Negative for shortness of breath.    Cardiovascular: Negative for chest pain.   Gastrointestinal: Negative for abdominal distention, abdominal pain, nausea and  vomiting.   Genitourinary: Negative.  Negative for difficulty urinating, dysuria, frequency, hematuria and urgency.        Pain with erections   Musculoskeletal: Negative.    Skin: Negative.    Neurological: Negative.    Psychiatric/Behavioral: Negative.      OBJECTIVE:     Vital Signs (Most Recent)  Pulse: (!) 59 (02/14/18 1116)  BP: 111/71 (02/14/18 1116)    Physical Exam   Constitutional: He is oriented to person, place, and time. He appears well-developed and well-nourished. No distress.   HENT:   Head: Normocephalic and atraumatic.   Eyes: EOM are normal. No scleral icterus.   Neck: Normal range of motion. Neck supple.   Cardiovascular: Normal rate and regular rhythm.    Pulmonary/Chest: Effort normal and breath sounds normal. No respiratory distress.   Abdominal: Soft. Bowel sounds are normal. He exhibits no distension. There is no tenderness.   Musculoskeletal: Normal range of motion.   Neurological: He is alert and oriented to person, place, and time.   Skin: Skin is warm and dry.   Psychiatric: He has a normal mood and affect. His behavior is normal.         ASSESSMENT/PLAN:     A/P:  1. Urethral stricture, unspecified stricture type       Doing well post procedure. Informed him that his pain with erections is normal after this type of surgery and it should continue to improve with time.   Will see patient back in 3 months. He was instructed to call the clinic if he has any issues with his urination before his appointment time.   Discussed the pros and cons of CIC but patient warmed hold off.  If his stream starts slowing down he will call me right away so I can dilate him and he will then be willing to learn CIC

## 2018-06-13 ENCOUNTER — OFFICE VISIT (OUTPATIENT)
Dept: FAMILY MEDICINE | Facility: CLINIC | Age: 33
End: 2018-06-13
Payer: COMMERCIAL

## 2018-06-13 VITALS
SYSTOLIC BLOOD PRESSURE: 130 MMHG | OXYGEN SATURATION: 99 % | HEIGHT: 68 IN | TEMPERATURE: 98 F | DIASTOLIC BLOOD PRESSURE: 88 MMHG | HEART RATE: 60 BPM | RESPIRATION RATE: 17 BRPM | WEIGHT: 237 LBS | BODY MASS INDEX: 35.92 KG/M2

## 2018-06-13 DIAGNOSIS — M77.8 BILATERAL ELBOW TENDONITIS: Primary | ICD-10-CM

## 2018-06-13 PROCEDURE — 3008F BODY MASS INDEX DOCD: CPT | Mod: CPTII,S$GLB,, | Performed by: FAMILY MEDICINE

## 2018-06-13 PROCEDURE — 99999 PR PBB SHADOW E&M-EST. PATIENT-LVL III: CPT | Mod: PBBFAC,,, | Performed by: FAMILY MEDICINE

## 2018-06-13 PROCEDURE — 99213 OFFICE O/P EST LOW 20 MIN: CPT | Mod: S$GLB,,, | Performed by: FAMILY MEDICINE

## 2018-06-13 RX ORDER — NAPROXEN 500 MG/1
500 TABLET ORAL 2 TIMES DAILY
Qty: 60 TABLET | Refills: 2 | Status: SHIPPED | OUTPATIENT
Start: 2018-06-13 | End: 2019-10-07

## 2018-06-13 RX ORDER — METHYLPREDNISOLONE 4 MG/1
TABLET ORAL
Qty: 1 PACKAGE | Refills: 0 | Status: SHIPPED | OUTPATIENT
Start: 2018-06-13 | End: 2018-10-03

## 2018-06-13 NOTE — PROGRESS NOTES
Chief Complaint   Patient presents with    Elbow Pain       Rodríguez Graves is a 32 y.o. male who presents per the Chief Complaint.  Pt is known to me and was last seen by me on 6/27/2016.  All known chronic medical issues have been documented.       Elbow Pain   This is a chronic problem. The current episode started more than 1 month ago. The problem occurs daily. The problem has been gradually worsening. Associated symptoms include arthralgias (both elbows; right worse), headaches, nausea (last week; resolved) and numbness (both forearms; right worse). Pertinent negatives include no abdominal pain, anorexia, change in bowel habit, chest pain, chills, congestion, coughing, diaphoresis, fatigue, fever, joint swelling, myalgias, neck pain, rash, sore throat, swollen glands, urinary symptoms, vertigo, visual change, vomiting or weakness. The symptoms are aggravated by bending and exertion. He has tried NSAIDs for the symptoms. The treatment provided mild relief.        ROS  Review of Systems   Constitutional: Negative.  Negative for activity change, appetite change, chills, diaphoresis, fatigue, fever and unexpected weight change.   HENT: Negative.  Negative for congestion, ear pain, hearing loss, nosebleeds, postnasal drip, rhinorrhea, sinus pressure, sneezing, sore throat and trouble swallowing.    Eyes: Negative for pain and visual disturbance.   Respiratory: Negative for cough, choking and shortness of breath.    Cardiovascular: Negative for chest pain and leg swelling.   Gastrointestinal: Positive for nausea (last week; resolved). Negative for abdominal pain, anorexia, change in bowel habit, constipation, diarrhea and vomiting.   Genitourinary: Negative for difficulty urinating, dysuria, frequency and urgency.   Musculoskeletal: Positive for arthralgias (both elbows; right worse). Negative for back pain, gait problem, joint swelling, myalgias and neck pain.   Skin: Negative.  Negative for rash.  "  Allergic/Immunologic: Negative for environmental allergies and food allergies.   Neurological: Positive for numbness (both forearms; right worse) and headaches. Negative for dizziness, vertigo, seizures, syncope, weakness and light-headedness.   Psychiatric/Behavioral: Negative.  Negative for confusion, decreased concentration, dysphoric mood and sleep disturbance. The patient is not nervous/anxious.        Physical Exam  Vitals:    06/13/18 1111   BP: 130/88   Pulse: 60   Resp: 17   Temp: 98.2 °F (36.8 °C)    Body mass index is 36.03 kg/m².  Weight: 107.5 kg (236 lb 15.9 oz)   Height: 5' 8" (172.7 cm)     Physical Exam   Constitutional: He is oriented to person, place, and time. He appears well-developed and well-nourished. He is active and cooperative.  Non-toxic appearance. He does not have a sickly appearance. He does not appear ill. No distress.   HENT:   Head: Normocephalic and atraumatic.   Right Ear: Hearing and external ear normal. No decreased hearing is noted.   Left Ear: Hearing and external ear normal. No decreased hearing is noted.   Nose: Nose normal. No rhinorrhea or nasal deformity.   Mouth/Throat: Uvula is midline and oropharynx is clear and moist. He does not have dentures. Normal dentition.   Eyes: Conjunctivae, EOM and lids are normal. Pupils are equal, round, and reactive to light. Right eye exhibits no chemosis, no discharge and no exudate. No foreign body present in the right eye. Left eye exhibits no chemosis, no discharge and no exudate. No foreign body present in the left eye. No scleral icterus.   Neck: Normal range of motion and full passive range of motion without pain. Neck supple.   Cardiovascular: Normal rate, regular rhythm, S1 normal, S2 normal and normal heart sounds.  Exam reveals no gallop and no friction rub.    No murmur heard.  Pulmonary/Chest: Effort normal and breath sounds normal. No accessory muscle usage. No respiratory distress. He has no decreased breath sounds. He " has no wheezes. He has no rhonchi. He has no rales.   Abdominal: Soft. Normal appearance. He exhibits no distension. There is no hepatosplenomegaly. There is no tenderness. There is no rigidity, no rebound and no guarding.   Musculoskeletal: Normal range of motion.   Neurological: He is alert and oriented to person, place, and time. He has normal strength. No cranial nerve deficit or sensory deficit. He exhibits normal muscle tone. He displays no seizure activity. Coordination and gait normal.   Skin: Skin is warm, dry and intact. No rash noted. He is not diaphoretic.   Psychiatric: He has a normal mood and affect. His speech is normal and behavior is normal. Judgment and thought content normal. Cognition and memory are normal. He is attentive.       Assessment & Plan    1. Bilateral elbow tendonitis  Likely related to repetitive overuse; patient works as a cook.  Patient was encouraged to apply RICE (Rest Ice Compression and Elevation) to joint, and take NSAID therapy every 8-12 hours as necessary.  Short course of oral steroid given for symptom relief.  NSAID prescribed for use after steroids as needed.  - naproxen (NAPROSYN) 500 MG tablet; Take 1 tablet (500 mg total) by mouth 2 (two) times daily.  Dispense: 60 tablet; Refill: 2  - methylPREDNISolone (MEDROL DOSEPACK) 4 mg tablet; use as directed  Dispense: 1 Package; Refill: 0      Follow up documented    ACTIVE MEDICAL ISSUES:  Documented in Problem List    PAST MEDICAL HISTORY  Documented    PAST SURGICAL HISTORY:  Documented    SOCIAL HISTORY:  Documented    FAMILY HISTORY:  Documented    ALLERGIES AND MEDICATIONS: updated and reviewed.  Documented    Health Maintenance       Date Due Completion Date    TETANUS VACCINE 11/12/2001 ---    Influenza Vaccine 08/01/2018 ---

## 2018-10-03 ENCOUNTER — OFFICE VISIT (OUTPATIENT)
Dept: FAMILY MEDICINE | Facility: CLINIC | Age: 33
End: 2018-10-03
Payer: COMMERCIAL

## 2018-10-03 VITALS
HEIGHT: 68 IN | WEIGHT: 238.75 LBS | SYSTOLIC BLOOD PRESSURE: 118 MMHG | RESPIRATION RATE: 16 BRPM | OXYGEN SATURATION: 99 % | DIASTOLIC BLOOD PRESSURE: 84 MMHG | BODY MASS INDEX: 36.19 KG/M2 | TEMPERATURE: 98 F | HEART RATE: 48 BPM

## 2018-10-03 DIAGNOSIS — E66.9 OBESITY (BMI 35.0-39.9 WITHOUT COMORBIDITY): ICD-10-CM

## 2018-10-03 DIAGNOSIS — M77.11 LATERAL EPICONDYLITIS OF RIGHT ELBOW: Primary | ICD-10-CM

## 2018-10-03 DIAGNOSIS — M65.4 DE QUERVAIN'S TENOSYNOVITIS, RIGHT: ICD-10-CM

## 2018-10-03 PROCEDURE — 20551 NJX 1 TENDON ORIGIN/INSJ: CPT | Mod: 59,RT,S$GLB, | Performed by: FAMILY MEDICINE

## 2018-10-03 PROCEDURE — 3008F BODY MASS INDEX DOCD: CPT | Mod: CPTII,S$GLB,, | Performed by: FAMILY MEDICINE

## 2018-10-03 PROCEDURE — 99999 PR PBB SHADOW E&M-EST. PATIENT-LVL IV: CPT | Mod: PBBFAC,,, | Performed by: FAMILY MEDICINE

## 2018-10-03 PROCEDURE — 20550 NJX 1 TENDON SHEATH/LIGAMENT: CPT | Mod: 59,LT,S$GLB, | Performed by: FAMILY MEDICINE

## 2018-10-03 PROCEDURE — 99214 OFFICE O/P EST MOD 30 MIN: CPT | Mod: 25,S$GLB,, | Performed by: FAMILY MEDICINE

## 2018-10-03 NOTE — PROGRESS NOTES
Subjective:       Patient ID: Rodríguez Graves is a 32 y.o. he has male    Chief Complaint: Pain (wrist, elbow and thumb since before LOV with Dr Lombard)    HPI    R elbow pain - onset 10 months ago that is intermittent, and worsened since the onset.     Little relief from steroids and naproxen when he saw another provider 3 months ago.     Pain is similar in his L elbow, but much less severe.     R elbow pain 5/10 today, but at work  this limits his ability to do what he needs to do.     R wrist pain - new - onset 1 month that is int, worse at work with repetitive movements. Pain radiates down to his R thumb.        Current Outpatient Medications on File Prior to Visit   Medication Sig Dispense Refill    naproxen (NAPROSYN) 500 MG tablet Take 1 tablet (500 mg total) by mouth 2 (two) times daily. 60 tablet 2    [DISCONTINUED] hyoscyamine (LEVSIN/SL) 0.125 mg Subl Place 1 tablet (0.125 mg total) under the tongue every 4 (four) hours as needed (bladder spasms). 20 tablet 1    [DISCONTINUED] methylPREDNISolone (MEDROL DOSEPACK) 4 mg tablet use as directed 1 Package 0    [DISCONTINUED] oxyCODONE-acetaminophen (PERCOCET) 5-325 mg per tablet Take 1 tablet by mouth every 4 (four) hours as needed. 31 tablet 0    [DISCONTINUED] polyethylene glycol (GLYCOLAX) 17 gram PwPk Take 17 g by mouth once daily. 30 packet 0     No current facility-administered medications on file prior to visit.        Past Medical History:   Diagnosis Date    Anxiety     Depression        Family History   Problem Relation Age of Onset    Hypertension Mother         reports that he has quit smoking. His smoking use included cigarettes. He started smoking about 9 years ago. he has never used smokeless tobacco. He reports that he drinks alcohol. He reports that he does not use drugs.    Review of Systems   Constitutional: Negative for chills and fever.   Musculoskeletal: Positive for arthralgias.   Skin: Negative for rash and wound.       Objective:      Vitals:    10/03/18 0856   BP: 118/84   Pulse: (!) 48   Resp: 16   Temp: 97.9 °F (36.6 °C)        Physical Exam   Constitutional: He appears well-developed. No distress.   O   HENT:   Head: Normocephalic and atraumatic.   Eyes: Conjunctivae are normal. No scleral icterus.   Pulmonary/Chest: Effort normal.   Musculoskeletal:        Right elbow: He exhibits normal range of motion, no swelling, no effusion, no deformity and no laceration. Tenderness found. Lateral epicondyle tenderness noted. No radial head, no medial epicondyle and no olecranon process tenderness noted.        Right wrist: He exhibits decreased range of motion and tenderness.        Arms:  Neurological: He is alert.   Psychiatric: He has a normal mood and affect. His behavior is normal.   Nursing note and vitals reviewed.      Assessment:       1. Lateral epicondylitis of right elbow    2. De Quervain's tenosynovitis, right    3. Obesity (BMI 35.0-39.9 without comorbidity)        Plan:       Rodríguez was seen today for pain.    Diagnoses and all orders for this visit:    Lateral epicondylitis of right elbow  1. Rest elbow as needed and protect it from movement that causes pain.  A wrap or splint to compress the muscles of the forearm may reduce pain during daytime activities. As symptoms improve, begin to increase movement at the elbow.   2. Apply an ice pack (ice cubes in a plastic bag, wrapped in a towel) over the injured area for 20 minutes every 1-2 hours the first day. Continue with ice packs 3-4 times a day for the next two days, then as needed for the relief of pain and swelling.   3. If appropriate, pt may use ibuprofen (Motrin, Advil), or naproxen (Aleve) to control pain, unless another pain medicine was prescribed. If you cannot take these medicines, you may use acetaminophen (Tylenol).  4. After healing, avoid the motion that caused your pain or learn to move in a way that causes less stress on the tendon. Continued use of a forearm wrap  may prevent recurrence.  5. PT handout given. Pt asked to perform exercises 2x a day as tolerated.   Counseled to follow up if redness or increased swelling worsen occurs, fever is present or loss of function, if no injection is done we may consider that if worsens or persists.     After discussing the risks and benefits patient opted for injection both his lateral epicondylitis and tenosynovitis. Risks include bleeding, infection, and bleachign of the skin which can be permanent.      De Quervain's tenosynovitis, right  New dx - pt educated on disease etiology, prognosis and treatment. Answered pts questions.    Obesity (BMI 35.0-39.9 without comorbidity)            Follow-up in about 2 weeks (around 10/17/2018) for Annual Physical.        Pt verbalized understanding and agreed with our plan.

## 2018-10-03 NOTE — PROCEDURES
Procedures      R epicondylitis injection   Patient informed and written consent obtained after discussing R/B/A, all patients questions answered, patient voiced understanding.  Indications: trigger finger    Pertinent lab values: NA    Type of anesthesia: Gebauer's anesthetic cold spray  Procedure:  Patient prepped in sterile fashion, trigger points identified at base of L thumb, 20 mg (0.5cc) of kenalog and 0.5 cc of lidocaine 1% without epi injected into trigger finger location.      Complications: none  Estimated blood loss: none  Patient tolerated procedure well.  Given wound care instructions and instructions on activity and when to return to full activity.    NDC from [id number]         for kenalog     De Quervains injection R:  Patient informed and written consent obtained after discussing R/B/A, all patients questions answered, patient voiced understanding.  Indications: trigger finger    Pertinent lab values: NA    Type of anesthesia: Gebauer's anesthetic cold spray  Procedure:  Patient prepped in sterile fashion, trigger points identified at base of L thumb, 20 mg (0.5cc) of kenalog and 0.5 cc of lidocaine 1% without epi injected into trigger finger location.      Complications: none  Estimated blood loss: none  Patient tolerated procedure well.  Given wound care instructions and instructions on activity and when to return to full activity.    NDC from [id number]    CPT: 10838  CPT for DeQuervain's 76949     for kenalog

## 2018-10-17 ENCOUNTER — LAB VISIT (OUTPATIENT)
Dept: LAB | Facility: HOSPITAL | Age: 33
End: 2018-10-17
Attending: FAMILY MEDICINE
Payer: COMMERCIAL

## 2018-10-17 ENCOUNTER — OFFICE VISIT (OUTPATIENT)
Dept: FAMILY MEDICINE | Facility: CLINIC | Age: 33
End: 2018-10-17
Payer: COMMERCIAL

## 2018-10-17 VITALS
HEIGHT: 68 IN | RESPIRATION RATE: 16 BRPM | WEIGHT: 234.38 LBS | SYSTOLIC BLOOD PRESSURE: 120 MMHG | DIASTOLIC BLOOD PRESSURE: 88 MMHG | BODY MASS INDEX: 35.52 KG/M2 | HEART RATE: 58 BPM | OXYGEN SATURATION: 98 % | TEMPERATURE: 98 F

## 2018-10-17 DIAGNOSIS — Z00.00 ANNUAL PHYSICAL EXAM: ICD-10-CM

## 2018-10-17 DIAGNOSIS — Z00.00 ANNUAL PHYSICAL EXAM: Primary | ICD-10-CM

## 2018-10-17 LAB
ALBUMIN SERPL BCP-MCNC: 4 G/DL
ALP SERPL-CCNC: 109 U/L
ALT SERPL W/O P-5'-P-CCNC: 16 U/L
ANION GAP SERPL CALC-SCNC: 5 MMOL/L
AST SERPL-CCNC: 18 U/L
BILIRUB SERPL-MCNC: 0.9 MG/DL
BUN SERPL-MCNC: 19 MG/DL
CALCIUM SERPL-MCNC: 9.5 MG/DL
CHLORIDE SERPL-SCNC: 106 MMOL/L
CHOLEST SERPL-MCNC: 203 MG/DL
CHOLEST/HDLC SERPL: 2.6 {RATIO}
CO2 SERPL-SCNC: 28 MMOL/L
CREAT SERPL-MCNC: 1.1 MG/DL
EST. GFR  (AFRICAN AMERICAN): >60 ML/MIN/1.73 M^2
EST. GFR  (NON AFRICAN AMERICAN): >60 ML/MIN/1.73 M^2
ESTIMATED AVG GLUCOSE: 100 MG/DL
GLUCOSE SERPL-MCNC: 92 MG/DL
HBA1C MFR BLD HPLC: 5.1 %
HDLC SERPL-MCNC: 77 MG/DL
HDLC SERPL: 37.9 %
LDLC SERPL CALC-MCNC: 120.4 MG/DL
NONHDLC SERPL-MCNC: 126 MG/DL
POTASSIUM SERPL-SCNC: 4.9 MMOL/L
PROT SERPL-MCNC: 7.5 G/DL
SODIUM SERPL-SCNC: 139 MMOL/L
TRIGL SERPL-MCNC: 28 MG/DL

## 2018-10-17 PROCEDURE — 80053 COMPREHEN METABOLIC PANEL: CPT

## 2018-10-17 PROCEDURE — 83036 HEMOGLOBIN GLYCOSYLATED A1C: CPT

## 2018-10-17 PROCEDURE — 99395 PREV VISIT EST AGE 18-39: CPT | Mod: S$GLB,,, | Performed by: FAMILY MEDICINE

## 2018-10-17 PROCEDURE — 80061 LIPID PANEL: CPT

## 2018-10-17 PROCEDURE — 36415 COLL VENOUS BLD VENIPUNCTURE: CPT | Mod: PO

## 2018-10-17 PROCEDURE — 99999 PR PBB SHADOW E&M-EST. PATIENT-LVL III: CPT | Mod: PBBFAC,,, | Performed by: FAMILY MEDICINE

## 2018-10-17 PROCEDURE — 86703 HIV-1/HIV-2 1 RESULT ANTBDY: CPT

## 2018-10-17 NOTE — PROGRESS NOTES
Subjective:       Patient ID: Rodríguez Graves is a 32 y.o. male.    Chief Complaint: Annual Exam    HPI    Annual Physical    Diet: 4-/10 --> lost of sweets.     Exercise: Lots of movement at work.     Immunizations required: flu - declined due to lack of perceived need, but will think about it.     Cancer screenings required: None    Other screenings required: None    Acute complaints today: None    HIV screen? yes        Current Outpatient Medications on File Prior to Visit   Medication Sig Dispense Refill    naproxen (NAPROSYN) 500 MG tablet Take 1 tablet (500 mg total) by mouth 2 (two) times daily. 60 tablet 2     No current facility-administered medications on file prior to visit.        Past Medical History:   Diagnosis Date    Anxiety     Depression        Family History   Problem Relation Age of Onset    Hypertension Mother         reports that he has quit smoking. His smoking use included cigarettes. He started smoking about 9 years ago. he has never used smokeless tobacco. He reports that he drinks alcohol. He reports that he does not use drugs.    Review of Systems   Constitutional: Negative for chills and fever.   HENT: Negative for ear pain and rhinorrhea.    Eyes: Negative for discharge and visual disturbance.   Respiratory: Negative for shortness of breath and wheezing.    Cardiovascular: Negative for chest pain and palpitations.   Gastrointestinal: Negative for diarrhea and vomiting.   Genitourinary: Negative for dysuria and hematuria.   Musculoskeletal: Positive for arthralgias.   Skin: Negative for rash and wound.   Neurological: Positive for headaches. Negative for syncope and weakness.   Psychiatric/Behavioral: Negative for dysphoric mood. The patient is not nervous/anxious.        Objective:     Vitals:    10/17/18 0955   BP: 120/88   Pulse: (!) 58   Resp: 16   Temp: 98.1 °F (36.7 °C)        Physical Exam   Constitutional: He is oriented to person, place, and time. He appears well-developed. No  distress.   O   HENT:   Head: Normocephalic and atraumatic.   Right Ear: External ear normal. No foreign bodies. Tympanic membrane is not injected. No middle ear effusion.   Left Ear: External ear normal. No foreign bodies. Tympanic membrane is not injected.  No middle ear effusion.   Mouth/Throat: Oropharynx is clear and moist. No oral lesions. No dental abscesses.   PND   Eyes: Conjunctivae and EOM are normal. Pupils are equal, round, and reactive to light. Right eye exhibits no discharge. Left eye exhibits no discharge.   Cardiovascular: Normal rate, regular rhythm and normal heart sounds. Exam reveals no gallop and no friction rub.   No murmur heard.  Pulmonary/Chest: Effort normal and breath sounds normal. No respiratory distress. He has no wheezes. He has no rales. He exhibits no tenderness.   Abdominal: Soft. Bowel sounds are normal. He exhibits no distension and no mass. There is no tenderness. There is no rebound and no guarding.   Musculoskeletal: He exhibits no edema.   No gross extremity deformity   Neurological: He is alert and oriented to person, place, and time. No cranial nerve deficit.   Skin: No rash noted. No erythema.   Psychiatric: He has a normal mood and affect. His behavior is normal.   Nursing note and vitals reviewed.      Assessment:       1. Annual physical exam        Plan:       Rodríguez was seen today for annual exam.    Diagnoses and all orders for this visit:    Annual physical exam  -     Comprehensive metabolic panel; Future  -     Lipid panel; Future  -     Hemoglobin A1c; Future  -     HIV-1 and HIV-2 antibodies; Future    Counseled on age appropriate medical preventative services, including age appropriate cancer screenings, over all nutritional health, need for a consistent exercise regimen and an over all push towards maintaining a vigorous and active lifestyle.      Counseled on age appropriate vaccines and discussed upcoming health care needs based on age/gender.  Spent time  with patient counseling on need for a good patient/doctor relationship moving forward.            Follow-up for Annual Physical.      Pt verbalized understanding and agreed with our plan.

## 2018-10-18 LAB — HIV 1+2 AB+HIV1 P24 AG SERPL QL IA: NEGATIVE

## 2018-10-19 ENCOUNTER — TELEPHONE (OUTPATIENT)
Dept: FAMILY MEDICINE | Facility: CLINIC | Age: 33
End: 2018-10-19

## 2018-10-19 NOTE — TELEPHONE ENCOUNTER
----- Message from Son Lozoya MD sent at 10/18/2018  4:36 PM CDT -----  Please inform patient that his blood work looks really good.  His total cholesterol looks a little high because his good cholesterol, the HDL, is elevated which is actually associated with less risk of heart attack and stroke.  Please encourage him to continue dietary modification and exercise.

## 2018-10-26 NOTE — TELEPHONE ENCOUNTER
Lm for patient to call the office to be notified of results. 3rd attempt unable to reach by phone, patient viewed results on portal

## 2018-11-13 ENCOUNTER — OFFICE VISIT (OUTPATIENT)
Dept: FAMILY MEDICINE | Facility: CLINIC | Age: 33
End: 2018-11-13
Payer: COMMERCIAL

## 2018-11-13 VITALS
WEIGHT: 245.38 LBS | SYSTOLIC BLOOD PRESSURE: 138 MMHG | TEMPERATURE: 99 F | RESPIRATION RATE: 16 BRPM | DIASTOLIC BLOOD PRESSURE: 90 MMHG | HEART RATE: 56 BPM | HEIGHT: 68 IN | BODY MASS INDEX: 37.19 KG/M2 | OXYGEN SATURATION: 98 %

## 2018-11-13 DIAGNOSIS — E66.9 OBESITY (BMI 35.0-39.9 WITHOUT COMORBIDITY): ICD-10-CM

## 2018-11-13 DIAGNOSIS — M77.11 RIGHT LATERAL EPICONDYLITIS: Primary | ICD-10-CM

## 2018-11-13 DIAGNOSIS — R03.0 ELEVATED BLOOD PRESSURE READING: ICD-10-CM

## 2018-11-13 PROCEDURE — 99214 OFFICE O/P EST MOD 30 MIN: CPT | Mod: S$GLB,,, | Performed by: FAMILY MEDICINE

## 2018-11-13 PROCEDURE — 3008F BODY MASS INDEX DOCD: CPT | Mod: CPTII,S$GLB,, | Performed by: FAMILY MEDICINE

## 2018-11-13 PROCEDURE — 99999 PR PBB SHADOW E&M-EST. PATIENT-LVL IV: CPT | Mod: PBBFAC,,, | Performed by: FAMILY MEDICINE

## 2018-11-13 NOTE — PATIENT INSTRUCTIONS
"Please look into the Dietary Approach to Stopping Hypertension or the "DASH" diet - google this! If you want recipes, you can also search for these for free!    If time constraints are a big obstacle to healthy cooking/eating in your life, you may be interested in :     1) Healthy Course / Skinny Course - in [place]! Please look up on google.     2) Freshly - healthy meals that are usually low carb. Check the nutrition facts before you buy and select options with lower saturated fat (less than 4 grams if possible). Some options that qualify as low in saturated fat are: [place]egalese Chicken, Chicken Rice Pilaf, and Southwestern Chicken.     Nutrition Action Health Letter - by the [employer] for Science in the Public Interest. Make informed dietary decisions and obtain great recipes as well! Not funded by "the industry".     Read Food Labels - Serving Size, calories, fat and sugar.     My Fitness Pal - a free kathie that can help calorie counting    Weight Watchers - new product design is more user friendly and I love the support group facet to this intervention! They help teach you to make more informed decisions about what you eat.     Portion Control + More: The food pyramid has been replaced! Check out choosemyplate.gov - published by the NIH.     "

## 2018-11-13 NOTE — PROGRESS NOTES
Subjective:       Patient ID: Rodríguez Graves is a 33 y.o. male.    Chief Complaint: tennis elbow (right wrist and elbow pain follow up )    HPI    Tennis elbow - Pts sxs have returned after improving s/p injection and HEP. Pts job definitely causes his sxs to flare. He has slacked off the HEp as well.     Naproxen helps minimally. He is also using an arm brace, which helped initially, but now seems less effective.     Elevated BP - noted on vital signs for visit today.  Confirmed by me.  Patient has distant history of elevated blood pressures but nothing consistent.  Patient has never been told that he has hypertension before.  He denies any complaints currently other than as elbow pain described above.  Current Outpatient Medications on File Prior to Visit   Medication Sig Dispense Refill    naproxen (NAPROSYN) 500 MG tablet Take 1 tablet (500 mg total) by mouth 2 (two) times daily. 60 tablet 2     No current facility-administered medications on file prior to visit.        Past Medical History:   Diagnosis Date    Anxiety     Depression        Family History   Problem Relation Age of Onset    Hypertension Mother         reports that he has quit smoking. His smoking use included cigarettes. He started smoking about 9 years ago. he has never used smokeless tobacco. He reports that he drinks alcohol. He reports that he does not use drugs.    Review of Systems   Constitutional: Negative for chills and fever.   Musculoskeletal: Positive for arthralgias and joint swelling.       Objective:     Vitals:    11/13/18 1009   BP: (!) 138/90   Pulse: (!) 56   Resp: 16   Temp: 98.7 °F (37.1 °C)        Physical Exam   Constitutional: He appears well-developed. No distress.   HENT:   Head: Normocephalic and atraumatic.   Eyes: Conjunctivae are normal. No scleral icterus.   Pulmonary/Chest: Effort normal.   Musculoskeletal:        Right elbow: He exhibits normal range of motion, no swelling, no effusion, no deformity and no  "laceration. Tenderness found. Lateral epicondyle tenderness noted. No radial head, no medial epicondyle and no olecranon process tenderness noted.   Neurological: He is alert.   Psychiatric: He has a normal mood and affect. His behavior is normal.   Nursing note and vitals reviewed.      Assessment:       1. Right lateral epicondylitis    2. Elevated blood pressure reading    3. Obesity (BMI 35.0-39.9 without comorbidity)        Plan:       Rodríguez was seen today for tennis elbow.    Diagnoses and all orders for this visit:    Right lateral epicondylitis  -     Ambulatory Referral to Physical/Occupational Therapy  Patient has failed home exercise program. He was not consistent in performing the rec'd exercises.  He has also failed steroid injection anti-inflammatory use and tennis elbow brace.    Elevated blood pressure reading  Explained the patient that he meets criteria with blood pressure today to be diagnosed with blood pressure but that it is best to have 2 elevations on 2 separate days to confirm the diagnosis.  I will have him come back in 2 weeks for a nurse blood pressure check.  I have asked him in the meantime to undergo significant dietary changes including reduction in salt intake decreasing portion size and increased exercise.    Obesity (BMI 35.0-39.9 without comorbidity)  Please look into the Dietary Approach to Stopping Hypertension or the "DASH" diet - google this! If you want recipes, you can also search for these for free!    If time constraints are a big obstacle to healthy cooking/eating in your life, you may be interested in :     1) Healthy Course / Skinny Course - in [place]! Please look up on google.     2) Freshly - healthy meals that are usually low carb. Check the nutrition facts before you buy and select options with lower saturated fat (less than 4 grams if possible). Some options that qualify as low in saturated fat are: [place]i Chicken, Chicken Rice Pilaf, and Southwestern Chicken. " "    Nutrition Action Health Letter - by the [employer] in the Public Interest. Make informed dietary decisions and obtain great recipes as well! Not funded by "the industry".     Read Food Labels - Serving Size, calories, fat and sugar.     My Fitness Pal - a free kathie that can help calorie counting    Weight Watchers - new product design is more user friendly and I love the support group facet to this intervention! They help teach you to make more informed decisions about what you eat.     Portion Control + More: The food pyramid has been replaced! Check out choosemyplate.gov - published by the NIH.           Follow-up in about 3 months (around 2/13/2019) for elevated blood pressure, tennis elbow.        Pt verbalized understanding and agreed with our plan.   "

## 2018-11-27 ENCOUNTER — CLINICAL SUPPORT (OUTPATIENT)
Dept: FAMILY MEDICINE | Facility: CLINIC | Age: 33
End: 2018-11-27
Payer: COMMERCIAL

## 2018-11-27 VITALS — SYSTOLIC BLOOD PRESSURE: 138 MMHG | DIASTOLIC BLOOD PRESSURE: 86 MMHG

## 2018-11-27 DIAGNOSIS — R03.0 ELEVATED BLOOD PRESSURE READING: Primary | ICD-10-CM

## 2018-11-27 NOTE — PROGRESS NOTES
Rodríguez Graves 33 y.o. male is here today for Blood Pressure check.   History of HTN no.    Review of patient's allergies indicates:  No Known Allergies  Creatinine   Date Value Ref Range Status   10/17/2018 1.1 0.5 - 1.4 mg/dL Final     Sodium   Date Value Ref Range Status   10/17/2018 139 136 - 145 mmol/L Final     Potassium   Date Value Ref Range Status   10/17/2018 4.9 3.5 - 5.1 mmol/L Final   ]  Patient denies taking blood pressure medications on a regular basis at the same time of the day.     Current Outpatient Medications:     naproxen (NAPROSYN) 500 MG tablet, Take 1 tablet (500 mg total) by mouth 2 (two) times daily., Disp: 60 tablet, Rfl: 2  Does patient have record of home blood pressure readings no. Readings have been averaging not done.   Last dose of blood pressure medication was taken at not on any medications at this time.  Patient is asymptomatic.   Complains of no complaints.    Vitals:    11/27/18 0905   BP: 138/86   BP Location: Right arm   Patient Position: Sitting   BP Method: Large (Manual)         Dr. Lozoya informed of nurse visit.

## 2019-09-25 ENCOUNTER — PATIENT OUTREACH (OUTPATIENT)
Dept: ADMINISTRATIVE | Facility: OTHER | Age: 34
End: 2019-09-25

## 2019-10-04 ENCOUNTER — PATIENT OUTREACH (OUTPATIENT)
Dept: ADMINISTRATIVE | Facility: OTHER | Age: 34
End: 2019-10-04

## 2019-10-07 ENCOUNTER — OFFICE VISIT (OUTPATIENT)
Dept: UROLOGY | Facility: CLINIC | Age: 34
End: 2019-10-07
Payer: COMMERCIAL

## 2019-10-07 VITALS
DIASTOLIC BLOOD PRESSURE: 89 MMHG | HEART RATE: 66 BPM | BODY MASS INDEX: 38.42 KG/M2 | SYSTOLIC BLOOD PRESSURE: 135 MMHG | HEIGHT: 68 IN | WEIGHT: 253.5 LBS

## 2019-10-07 DIAGNOSIS — E29.1 TESTICULAR FAILURE: Primary | ICD-10-CM

## 2019-10-07 PROCEDURE — 3008F BODY MASS INDEX DOCD: CPT | Mod: CPTII,S$GLB,, | Performed by: UROLOGY

## 2019-10-07 PROCEDURE — 99214 OFFICE O/P EST MOD 30 MIN: CPT | Mod: S$GLB,,, | Performed by: UROLOGY

## 2019-10-07 PROCEDURE — 3008F PR BODY MASS INDEX (BMI) DOCUMENTED: ICD-10-PCS | Mod: CPTII,S$GLB,, | Performed by: UROLOGY

## 2019-10-07 PROCEDURE — 99999 PR PBB SHADOW E&M-EST. PATIENT-LVL III: CPT | Mod: PBBFAC,,, | Performed by: UROLOGY

## 2019-10-07 PROCEDURE — 99999 PR PBB SHADOW E&M-EST. PATIENT-LVL III: ICD-10-PCS | Mod: PBBFAC,,, | Performed by: UROLOGY

## 2019-10-07 PROCEDURE — 99214 PR OFFICE/OUTPT VISIT, EST, LEVL IV, 30-39 MIN: ICD-10-PCS | Mod: S$GLB,,, | Performed by: UROLOGY

## 2019-10-07 RX ORDER — TADALAFIL 20 MG/1
20 TABLET ORAL DAILY PRN
Qty: 10 TABLET | Refills: 11 | Status: SHIPPED | OUTPATIENT
Start: 2019-10-07 | End: 2020-08-12 | Stop reason: SDUPTHER

## 2019-10-07 NOTE — PROGRESS NOTES
"Chief Complaint:  Infertility    HPI:    Mr. Graves is a 33 y.o.  male who has been  to his wife for the past 3 years. They have been trying to achieve a pregnancy for the past 1 years but without success. Rodríguez Graves has not undergone a semen analysis. He has a history of erectile dysfunction.  No ejaculatory problems.    He has achieved 0 pregnancies in the past.    Yuly Graves is 32 years old. ( 87) Her menses are regular. She has not undergone prior hysterosalpingogram. She has achieved 0 prior pregnancies.  She sees Dr. Sheikh.    The couple has not undergone prior intrauterine insemination procedures.    The couple has not undergone prior in-vitro fertilization procedures.    Rodríguez Graves denies a history of exposure to harmful chemicals, toxins, and radiation.    No history of recent fevers greater than 101.5 degrees Farenheit.    No history of recent exposure to "wet heat."    No history of urological trauma or testicular torsion.    No history of prostatitis, epididymitis, and orchitis.    No history of post-pubertal mumps.    There is no known family history of fertility problems.    REVIEW OF SYSTEMS:     He denies headache, blurred vision, fever, nausea, vomiting, chills, flank discomfort, abdominal pain, bleeding per rectum, cough, SOB, recent loss of consciousness, recent mental status changes, seizures, dizziness, or upper/lower extremity weakness.    PHYSICAL EXAM:     The patient generally appears in good health, is appropriately interactive, and is in no apparent distress.     Eyes: anicteric sclerae, moist conjunctivae; no lid-lag; PERRLA     HENT: Atraumatic; oropharynx clear with moist mucous membranes and no mucosal ulcerations;normal hard and soft palate.  No evidence of lymphadenopathy.    Neck: Trachea midline.  No thyromegaly.    Musculoskeletal: No abnormal gait.    Skin: No lesions.    Mental: Cooperative with normal affect.  Is oriented to time, place, and " "person.    Neuro: Grossly intact.    Chest: Normal inspiratory effort.   No accessory muscles.  No audible wheezes.  Respirations symmetric on inspiration and expiration.    Heart: Regular rhythm.      Abdomen:  Soft, non-tender. No masses or organomegaly. Bladder is not palpable. No evidence of flank discomfort. No evidence of inguinal hernia.    Genitourinary: Penis is normal with no evidence of plaques or induration. Urethral meatus is normal. Scrotum is normal. Testes are descended bilaterally with no evidence of abnormal masses or tenderness. Epididymis, vas deferens, and cord structures are normal bilaterally.  Testicular volume is approximately 8 cc bilaterally.    Extremities: No cyanosis, clubbing, or edema.    IMPRESSION & PLAN:    Mr. Graves is a 33 y.o.  male who has been  to his wife for the past 3 years. They have been trying to achieve a pregnancy for the past 1 years but without success. Rodríguez Graves has not undergone a semen analysis. He has a history of erectile dysfunction.  No ejaculatory problems.    He has achieved 0 pregnancies in the past.    Yuly Graves is 32 years old. ( 87) Her menses are regular. She has not undergone prior hysterosalpingogram. She has achieved 0 prior pregnancies.  She sees Dr. Sheikh.    1.  FSH, LH, testosterone, prolactin, and estradiol serum levels today.  2.  Semen analysis x 2.  3.  Return to the clinic in 3 weeks to discuss test results and treatment plan.  4.  Recommend avoiding "wet heat."  5.  Recommend taking a multivitamin and 500 mg of vitamin c daily in addition to the multivitamin.  6.  Please send a copy of the note to Dr. Sheikh.  Thank you for the consultation.  7.  Will use Cialis for the ED. Side effects discussed.  A new Rx was given     CC: Aguilar    "

## 2019-10-07 NOTE — LETTER
October 7, 2019        Nica Sheikh MD  [address]  [address]  [facility] [address]             [facility] - Urology 4th Floor  [address]  [place] [address]  Phone: [phone]   Patient: Rodríguez Graves   MR Number: [id number]   YOB: 1985   Date of Visit: 10/7/2019       Dear Dr. Sheikh:    Thank you for referring Rodríguez Graves to me for evaluation. Attached you will find relevant portions of my assessment and plan of care.    If you have questions, please do not hesitate to call me. I look forward to following Rodríguez Graves along with you.    Sincerely,      Ho Spence MD            CC  No Recipients    Enclosure

## 2019-10-07 NOTE — LETTER
October 7, 2019      Nica Sheikh MD  [address]  [address]  [facility] [address]           [facility] - Urology 4th Floor  [address]  [place] [address]  Phone: [phone]          Patient: Rodríguez Graves   MR Number: [id number]   YOB: 1985   Date of Visit: 10/7/2019       Dear Dr. Ncia Sheikh:    Thank you for referring Rodríguez Graves to me for evaluation. Attached you will find relevant portions of my assessment and plan of care.    If you have questions, please do not hesitate to call me. I look forward to following Rodríguez Graves along with you.    Sincerely,    Ho Spence MD    Enclosure  CC:  No Recipients    If you would like to receive this communication electronically, please contact [email or web address] or [phone] to request more information on [employer] access.    For providers and/or their staff who would like to refer a patient to [facility], please contact us through our one-stop-shop provider referral line, [facility] , at [phone].    If you feel you have received this communication in error or would no longer like to receive these types of communications, please e-mail [email or web address]

## 2020-08-12 ENCOUNTER — LAB VISIT (OUTPATIENT)
Dept: LAB | Facility: HOSPITAL | Age: 35
End: 2020-08-12
Attending: FAMILY MEDICINE
Payer: COMMERCIAL

## 2020-08-12 ENCOUNTER — OFFICE VISIT (OUTPATIENT)
Dept: FAMILY MEDICINE | Facility: CLINIC | Age: 35
End: 2020-08-12
Payer: COMMERCIAL

## 2020-08-12 VITALS
OXYGEN SATURATION: 97 % | SYSTOLIC BLOOD PRESSURE: 130 MMHG | RESPIRATION RATE: 18 BRPM | HEIGHT: 68 IN | DIASTOLIC BLOOD PRESSURE: 88 MMHG | HEART RATE: 64 BPM | TEMPERATURE: 98 F | BODY MASS INDEX: 39.2 KG/M2 | WEIGHT: 258.63 LBS

## 2020-08-12 DIAGNOSIS — Z13.220 ENCOUNTER FOR LIPID SCREENING FOR CARDIOVASCULAR DISEASE: ICD-10-CM

## 2020-08-12 DIAGNOSIS — N52.9 ERECTILE DYSFUNCTION, UNSPECIFIED ERECTILE DYSFUNCTION TYPE: ICD-10-CM

## 2020-08-12 DIAGNOSIS — Z00.00 ANNUAL PHYSICAL EXAM: Primary | ICD-10-CM

## 2020-08-12 DIAGNOSIS — Z11.59 NEED FOR HEPATITIS C SCREENING TEST: ICD-10-CM

## 2020-08-12 DIAGNOSIS — Z13.6 ENCOUNTER FOR LIPID SCREENING FOR CARDIOVASCULAR DISEASE: ICD-10-CM

## 2020-08-12 DIAGNOSIS — Z11.3 SCREEN FOR STD (SEXUALLY TRANSMITTED DISEASE): ICD-10-CM

## 2020-08-12 DIAGNOSIS — Z00.00 ANNUAL PHYSICAL EXAM: ICD-10-CM

## 2020-08-12 DIAGNOSIS — Z23 NEED FOR TD VACCINE: ICD-10-CM

## 2020-08-12 LAB
ALBUMIN SERPL BCP-MCNC: 3.9 G/DL (ref 3.5–5.2)
ALP SERPL-CCNC: 113 U/L (ref 55–135)
ALT SERPL W/O P-5'-P-CCNC: 26 U/L (ref 10–44)
ANION GAP SERPL CALC-SCNC: 10 MMOL/L (ref 8–16)
AST SERPL-CCNC: 23 U/L (ref 10–40)
BASOPHILS # BLD AUTO: 0.01 K/UL (ref 0–0.2)
BASOPHILS NFR BLD: 0.2 % (ref 0–1.9)
BILIRUB SERPL-MCNC: 0.9 MG/DL (ref 0.1–1)
BUN SERPL-MCNC: 15 MG/DL (ref 6–20)
CALCIUM SERPL-MCNC: 8.8 MG/DL (ref 8.7–10.5)
CHLORIDE SERPL-SCNC: 106 MMOL/L (ref 95–110)
CHOLEST SERPL-MCNC: 210 MG/DL (ref 120–199)
CHOLEST/HDLC SERPL: 3.1 {RATIO} (ref 2–5)
CO2 SERPL-SCNC: 24 MMOL/L (ref 23–29)
CREAT SERPL-MCNC: 1.2 MG/DL (ref 0.5–1.4)
DIFFERENTIAL METHOD: ABNORMAL
EOSINOPHIL # BLD AUTO: 0.1 K/UL (ref 0–0.5)
EOSINOPHIL NFR BLD: 1.5 % (ref 0–8)
ERYTHROCYTE [DISTWIDTH] IN BLOOD BY AUTOMATED COUNT: 12.8 % (ref 11.5–14.5)
EST. GFR  (AFRICAN AMERICAN): >60 ML/MIN/1.73 M^2
EST. GFR  (NON AFRICAN AMERICAN): >60 ML/MIN/1.73 M^2
ESTIMATED AVG GLUCOSE: 100 MG/DL (ref 68–131)
GLUCOSE SERPL-MCNC: 93 MG/DL (ref 70–110)
HBA1C MFR BLD HPLC: 5.1 % (ref 4–5.6)
HCT VFR BLD AUTO: 45.3 % (ref 40–54)
HDLC SERPL-MCNC: 68 MG/DL (ref 40–75)
HDLC SERPL: 32.4 % (ref 20–50)
HGB BLD-MCNC: 15.1 G/DL (ref 14–18)
IMM GRANULOCYTES # BLD AUTO: 0.01 K/UL (ref 0–0.04)
IMM GRANULOCYTES NFR BLD AUTO: 0.2 % (ref 0–0.5)
LDLC SERPL CALC-MCNC: 129.6 MG/DL (ref 63–159)
LYMPHOCYTES # BLD AUTO: 2.2 K/UL (ref 1–4.8)
LYMPHOCYTES NFR BLD: 36.5 % (ref 18–48)
MCH RBC QN AUTO: 31.5 PG (ref 27–31)
MCHC RBC AUTO-ENTMCNC: 33.3 G/DL (ref 32–36)
MCV RBC AUTO: 94 FL (ref 82–98)
MONOCYTES # BLD AUTO: 0.8 K/UL (ref 0.3–1)
MONOCYTES NFR BLD: 13.4 % (ref 4–15)
NEUTROPHILS # BLD AUTO: 2.9 K/UL (ref 1.8–7.7)
NEUTROPHILS NFR BLD: 48.2 % (ref 38–73)
NONHDLC SERPL-MCNC: 142 MG/DL
NRBC BLD-RTO: 0 /100 WBC
PLATELET # BLD AUTO: 237 K/UL (ref 150–350)
PMV BLD AUTO: 11.9 FL (ref 9.2–12.9)
POTASSIUM SERPL-SCNC: 4.1 MMOL/L (ref 3.5–5.1)
PROT SERPL-MCNC: 7 G/DL (ref 6–8.4)
RBC # BLD AUTO: 4.8 M/UL (ref 4.6–6.2)
SODIUM SERPL-SCNC: 140 MMOL/L (ref 136–145)
TRIGL SERPL-MCNC: 62 MG/DL (ref 30–150)
TSH SERPL DL<=0.005 MIU/L-ACNC: 2.03 UIU/ML (ref 0.4–4)
WBC # BLD AUTO: 5.91 K/UL (ref 3.9–12.7)

## 2020-08-12 PROCEDURE — 99395 PREV VISIT EST AGE 18-39: CPT | Mod: S$GLB,,, | Performed by: FAMILY MEDICINE

## 2020-08-12 PROCEDURE — 80053 COMPREHEN METABOLIC PANEL: CPT

## 2020-08-12 PROCEDURE — 86803 HEPATITIS C AB TEST: CPT

## 2020-08-12 PROCEDURE — 86703 HIV-1/HIV-2 1 RESULT ANTBDY: CPT

## 2020-08-12 PROCEDURE — 80061 LIPID PANEL: CPT

## 2020-08-12 PROCEDURE — 85025 COMPLETE CBC W/AUTO DIFF WBC: CPT

## 2020-08-12 PROCEDURE — 84443 ASSAY THYROID STIM HORMONE: CPT

## 2020-08-12 PROCEDURE — 82040 ASSAY OF SERUM ALBUMIN: CPT

## 2020-08-12 PROCEDURE — 99999 PR PBB SHADOW E&M-EST. PATIENT-LVL III: ICD-10-PCS | Mod: PBBFAC,,, | Performed by: FAMILY MEDICINE

## 2020-08-12 PROCEDURE — 83036 HEMOGLOBIN GLYCOSYLATED A1C: CPT

## 2020-08-12 PROCEDURE — 99395 PR PREVENTIVE VISIT,EST,18-39: ICD-10-PCS | Mod: S$GLB,,, | Performed by: FAMILY MEDICINE

## 2020-08-12 PROCEDURE — 99999 PR PBB SHADOW E&M-EST. PATIENT-LVL III: CPT | Mod: PBBFAC,,, | Performed by: FAMILY MEDICINE

## 2020-08-12 RX ORDER — TADALAFIL 20 MG/1
20 TABLET ORAL DAILY PRN
Qty: 10 TABLET | Refills: 11 | Status: SHIPPED | OUTPATIENT
Start: 2020-08-12 | End: 2021-03-04

## 2020-08-12 NOTE — PROGRESS NOTES
Subjective:       Patient ID: Rodríguez Graves is a 34 y.o. male.    Chief Complaint: Annual Exam      HPI  34-year-old male presents for annual exam.  Patient states he is doing overall.  Was previously being counseled for fertility.  Patient states he is unable to follow-up with the urologist due to finances.  States he is doing well overall.  Would like refill on his Cialis.  Denies any other issues at this time.      Review of Systems   Constitutional: Negative.    HENT: Negative.    Respiratory: Negative.    Cardiovascular: Negative.    Gastrointestinal: Negative.    Endocrine: Negative.    Genitourinary: Negative.    Musculoskeletal: Negative.    Neurological: Negative.    Psychiatric/Behavioral: Negative.           Past Medical History:   Diagnosis Date    Anxiety     Depression      Past Surgical History:   Procedure Laterality Date    Urecthrotomy      WISDOM TOOTH EXTRACTION      2 extracted      Family History   Problem Relation Age of Onset    Hypertension Mother      Social History     Socioeconomic History    Marital status:      Spouse name: Not on file    Number of children: Not on file    Years of education: Not on file    Highest education level: Not on file   Occupational History    Not on file   Social Needs    Financial resource strain: Not on file    Food insecurity     Worry: Not on file     Inability: Not on file    Transportation needs     Medical: Not on file     Non-medical: Not on file   Tobacco Use    Smoking status: Former Smoker     Types: Cigarettes     Start date: 6/27/2009    Smokeless tobacco: Never Used   Substance and Sexual Activity    Alcohol use: Yes     Comment: occacionally    Drug use: No    Sexual activity: Yes     Partners: Female     Birth control/protection: None     Comment: 11/6/17  with same partner 10 years    Lifestyle    Physical activity     Days per week: Not on file     Minutes per session: Not on file    Stress: Not on file  "  Relationships    Social connections     Talks on phone: Not on file     Gets together: Not on file     Attends [employer] service: Not on file     Active member of club or organization: Not on file     Attends meetings of clubs or organizations: Not on file     Relationship status: Not on file   Other Topics Concern    Not on file   Social History Narrative    Not on file       Current Outpatient Medications:     tadalafiL (CIALIS) 20 MG Tab, Take 1 tablet (20 mg total) by mouth daily as needed. Take 1 hour before intercourse, Disp: 10 tablet, Rfl: 11   Objective:      Vitals:    08/12/20 0744   BP: 130/88   BP Location: Left arm   Patient Position: Sitting   BP Method: Large (Automatic)   Pulse: 64   Resp: 18   Temp: 98.4 °F (36.9 °C)   TempSrc: Oral   SpO2: 97%   Weight: 117.3 kg (258 lb 9.6 oz)   Height: 5' 7.8" (1.722 m)       Physical Exam  Constitutional:       General: He is not in acute distress.  HENT:      Head: Normocephalic and atraumatic.   Eyes:      Conjunctiva/sclera: Conjunctivae normal.   Neck:      Musculoskeletal: Neck supple.   Cardiovascular:      Rate and Rhythm: Normal rate and regular rhythm.      Heart sounds: Normal heart sounds. No murmur. No friction rub. No gallop.    Pulmonary:      Effort: Pulmonary effort is normal.      Breath sounds: Normal breath sounds. No wheezing or rales.   Skin:     General: Skin is warm and dry.   Neurological:      Mental Status: He is alert and oriented to person, place, and time.   Psychiatric:         Behavior: Behavior normal.         Thought Content: Thought content normal.         Judgment: Judgment normal.            Assessment:       1. Annual physical exam    2. Encounter for lipid screening for cardiovascular disease    3. Screen for STD (sexually transmitted disease)    4. Need for hepatitis C screening test    5. Need for Td vaccine    6. Erectile dysfunction, unspecified erectile dysfunction type        Plan:       Annual physical exam  -  "    CBC auto differential; Future; Expected date: 08/12/2020  -     Comprehensive metabolic panel; Future; Expected date: 08/12/2020  -     Lipid Panel; Future; Expected date: 08/12/2020  -     TSH; Future; Expected date: 08/12/2020  -     HIV 1/2 Ag/Ab (4th Gen); Future; Expected date: 08/12/2020  -     C. trachomatis/N. gonorrhoeae by AMP DNA [facility]; Urine; Future; Expected date: 08/12/2020  -     Hemoglobin A1C; Future; Expected date: 08/12/2020    Encounter for lipid screening for cardiovascular disease  -     Lipid Panel; Future; Expected date: 08/12/2020    Screen for STD (sexually transmitted disease)  -     HIV 1/2 Ag/Ab (4th Gen); Future; Expected date: 08/12/2020  -     C. trachomatis/N. gonorrhoeae by AMP DNA [facility]; Urine; Future; Expected date: 08/12/2020    Need for hepatitis C screening test  -     Hepatitis C Antibody; Future; Expected date: 08/12/2020    Need for Td vaccine  -     (In Office Administered) Td Vaccine - Preservative Free    Erectile dysfunction, unspecified erectile dysfunction type  -     tadalafiL (CIALIS) 20 MG Tab; Take 1 tablet (20 mg total) by mouth daily as needed. Take 1 hour before intercourse  Dispense: 10 tablet; Refill: 11  -     TESTOSTERONE PANEL; Future; Expected date: 08/12/2020    f/u labs. Has elevated FSH previously. Advised pt to f/u w/ urology.             Patient note was created using Buru Buru.  Any errors in syntax or even information may not have been identified and edited on initial review prior to signing this note.

## 2020-08-14 LAB
HCV AB SERPL QL IA: NEGATIVE
HIV 1+2 AB+HIV1 P24 AG SERPL QL IA: NEGATIVE

## 2020-08-18 LAB
ALBUMIN SERPL-MCNC: 4.1 G/DL (ref 3.6–5.1)
SHBG SERPL-SCNC: 33 NMOL/L (ref 10–50)
TESTOST FREE SERPL-MCNC: 72.9 PG/ML (ref 46–224)
TESTOST SERPL-MCNC: 521 NG/DL (ref 250–1100)
TESTOSTERONE.FREE+WB SERPL-MCNC: 137.2 NG/DL (ref 110–575)

## 2021-02-26 ENCOUNTER — PATIENT MESSAGE (OUTPATIENT)
Dept: UROLOGY | Facility: CLINIC | Age: 36
End: 2021-02-26

## 2021-03-04 ENCOUNTER — LAB VISIT (OUTPATIENT)
Dept: LAB | Facility: HOSPITAL | Age: 36
End: 2021-03-04
Attending: UROLOGY
Payer: COMMERCIAL

## 2021-03-04 ENCOUNTER — TELEPHONE (OUTPATIENT)
Dept: UROLOGY | Facility: CLINIC | Age: 36
End: 2021-03-04

## 2021-03-04 ENCOUNTER — OFFICE VISIT (OUTPATIENT)
Dept: UROLOGY | Facility: CLINIC | Age: 36
End: 2021-03-04
Payer: COMMERCIAL

## 2021-03-04 VITALS
BODY MASS INDEX: 40.49 KG/M2 | HEART RATE: 63 BPM | HEIGHT: 67 IN | WEIGHT: 258 LBS | SYSTOLIC BLOOD PRESSURE: 157 MMHG | DIASTOLIC BLOOD PRESSURE: 101 MMHG

## 2021-03-04 DIAGNOSIS — N52.01 ERECTILE DYSFUNCTION DUE TO ARTERIAL INSUFFICIENCY: ICD-10-CM

## 2021-03-04 DIAGNOSIS — E29.1 TESTICULAR FAILURE: Primary | ICD-10-CM

## 2021-03-04 DIAGNOSIS — E29.1 TESTICULAR FAILURE: ICD-10-CM

## 2021-03-04 DIAGNOSIS — R79.89 ELEVATED PROLACTIN LEVEL: Primary | ICD-10-CM

## 2021-03-04 LAB
ESTRADIOL SERPL-MCNC: 23 PG/ML (ref 11–44)
FSH SERPL-ACNC: 17.5 MIU/ML (ref 0.95–11.95)
LH SERPL-ACNC: 9.7 MIU/ML (ref 0.6–12.1)
PROLACTIN SERPL IA-MCNC: 27.2 NG/ML (ref 3.5–19.4)
TESTOST SERPL-MCNC: 633 NG/DL (ref 304–1227)

## 2021-03-04 PROCEDURE — 36415 COLL VENOUS BLD VENIPUNCTURE: CPT | Performed by: UROLOGY

## 2021-03-04 PROCEDURE — 99999 PR PBB SHADOW E&M-EST. PATIENT-LVL III: ICD-10-PCS | Mod: PBBFAC,,, | Performed by: UROLOGY

## 2021-03-04 PROCEDURE — 3008F PR BODY MASS INDEX (BMI) DOCUMENTED: ICD-10-PCS | Mod: CPTII,S$GLB,, | Performed by: UROLOGY

## 2021-03-04 PROCEDURE — 83002 ASSAY OF GONADOTROPIN (LH): CPT | Performed by: UROLOGY

## 2021-03-04 PROCEDURE — 84403 ASSAY OF TOTAL TESTOSTERONE: CPT | Performed by: UROLOGY

## 2021-03-04 PROCEDURE — 99214 PR OFFICE/OUTPT VISIT, EST, LEVL IV, 30-39 MIN: ICD-10-PCS | Mod: S$GLB,,, | Performed by: UROLOGY

## 2021-03-04 PROCEDURE — 99214 OFFICE O/P EST MOD 30 MIN: CPT | Mod: S$GLB,,, | Performed by: UROLOGY

## 2021-03-04 PROCEDURE — 82670 ASSAY OF TOTAL ESTRADIOL: CPT | Performed by: UROLOGY

## 2021-03-04 PROCEDURE — 83001 ASSAY OF GONADOTROPIN (FSH): CPT | Performed by: UROLOGY

## 2021-03-04 PROCEDURE — 1126F PR PAIN SEVERITY QUANTIFIED, NO PAIN PRESENT: ICD-10-PCS | Mod: S$GLB,,, | Performed by: UROLOGY

## 2021-03-04 PROCEDURE — 1126F AMNT PAIN NOTED NONE PRSNT: CPT | Mod: S$GLB,,, | Performed by: UROLOGY

## 2021-03-04 PROCEDURE — 3008F BODY MASS INDEX DOCD: CPT | Mod: CPTII,S$GLB,, | Performed by: UROLOGY

## 2021-03-04 PROCEDURE — 84146 ASSAY OF PROLACTIN: CPT | Performed by: UROLOGY

## 2021-03-04 PROCEDURE — 99999 PR PBB SHADOW E&M-EST. PATIENT-LVL III: CPT | Mod: PBBFAC,,, | Performed by: UROLOGY

## 2021-03-04 RX ORDER — SILDENAFIL 100 MG/1
100 TABLET, FILM COATED ORAL DAILY PRN
Qty: 10 TABLET | Refills: 11 | Status: SHIPPED | OUTPATIENT
Start: 2021-03-04 | End: 2022-01-03

## 2021-03-11 ENCOUNTER — OFFICE VISIT (OUTPATIENT)
Dept: ENDOCRINOLOGY | Facility: CLINIC | Age: 36
End: 2021-03-11
Payer: COMMERCIAL

## 2021-03-11 ENCOUNTER — LAB VISIT (OUTPATIENT)
Dept: LAB | Facility: HOSPITAL | Age: 36
End: 2021-03-11
Payer: COMMERCIAL

## 2021-03-11 VITALS
BODY MASS INDEX: 41.12 KG/M2 | SYSTOLIC BLOOD PRESSURE: 122 MMHG | WEIGHT: 262.56 LBS | OXYGEN SATURATION: 99 % | HEART RATE: 82 BPM | DIASTOLIC BLOOD PRESSURE: 84 MMHG | RESPIRATION RATE: 16 BRPM

## 2021-03-11 DIAGNOSIS — R06.83 SNORING: Primary | ICD-10-CM

## 2021-03-11 DIAGNOSIS — R79.89 ELEVATED PROLACTIN LEVEL: ICD-10-CM

## 2021-03-11 DIAGNOSIS — R79.89 HIGH SERUM FOLLICLE STIMULATING HORMONE (FSH): ICD-10-CM

## 2021-03-11 LAB
FSH SERPL-ACNC: 17.4 MIU/ML (ref 0.95–11.95)
LH SERPL-ACNC: 8.8 MIU/ML (ref 0.6–12.1)
PROLACTIN SERPL IA-MCNC: 21.7 NG/ML (ref 3.5–19.4)
T4 FREE SERPL-MCNC: 1.05 NG/DL (ref 0.71–1.51)
TSH SERPL DL<=0.005 MIU/L-ACNC: 1.9 UIU/ML (ref 0.4–4)

## 2021-03-11 PROCEDURE — 83001 ASSAY OF GONADOTROPIN (FSH): CPT | Performed by: INTERNAL MEDICINE

## 2021-03-11 PROCEDURE — 3008F BODY MASS INDEX DOCD: CPT | Mod: CPTII,S$GLB,, | Performed by: INTERNAL MEDICINE

## 2021-03-11 PROCEDURE — 1126F PR PAIN SEVERITY QUANTIFIED, NO PAIN PRESENT: ICD-10-PCS | Mod: S$GLB,,, | Performed by: INTERNAL MEDICINE

## 2021-03-11 PROCEDURE — 84439 ASSAY OF FREE THYROXINE: CPT | Performed by: INTERNAL MEDICINE

## 2021-03-11 PROCEDURE — 84146 ASSAY OF PROLACTIN: CPT | Performed by: INTERNAL MEDICINE

## 2021-03-11 PROCEDURE — 99204 OFFICE O/P NEW MOD 45 MIN: CPT | Mod: S$GLB,,, | Performed by: INTERNAL MEDICINE

## 2021-03-11 PROCEDURE — 84305 ASSAY OF SOMATOMEDIN: CPT | Performed by: INTERNAL MEDICINE

## 2021-03-11 PROCEDURE — 83002 ASSAY OF GONADOTROPIN (LH): CPT | Performed by: INTERNAL MEDICINE

## 2021-03-11 PROCEDURE — 3008F PR BODY MASS INDEX (BMI) DOCUMENTED: ICD-10-PCS | Mod: CPTII,S$GLB,, | Performed by: INTERNAL MEDICINE

## 2021-03-11 PROCEDURE — 99999 PR PBB SHADOW E&M-EST. PATIENT-LVL IV: ICD-10-PCS | Mod: PBBFAC,,, | Performed by: INTERNAL MEDICINE

## 2021-03-11 PROCEDURE — 84443 ASSAY THYROID STIM HORMONE: CPT | Performed by: INTERNAL MEDICINE

## 2021-03-11 PROCEDURE — 1126F AMNT PAIN NOTED NONE PRSNT: CPT | Mod: S$GLB,,, | Performed by: INTERNAL MEDICINE

## 2021-03-11 PROCEDURE — 99204 PR OFFICE/OUTPT VISIT, NEW, LEVL IV, 45-59 MIN: ICD-10-PCS | Mod: S$GLB,,, | Performed by: INTERNAL MEDICINE

## 2021-03-11 PROCEDURE — 82397 CHEMILUMINESCENT ASSAY: CPT | Performed by: INTERNAL MEDICINE

## 2021-03-11 PROCEDURE — 99999 PR PBB SHADOW E&M-EST. PATIENT-LVL IV: CPT | Mod: PBBFAC,,, | Performed by: INTERNAL MEDICINE

## 2021-03-12 ENCOUNTER — TELEPHONE (OUTPATIENT)
Dept: ENDOCRINOLOGY | Facility: CLINIC | Age: 36
End: 2021-03-12

## 2021-03-12 ENCOUNTER — TELEPHONE (OUTPATIENT)
Dept: ADMINISTRATIVE | Facility: OTHER | Age: 36
End: 2021-03-12

## 2021-03-12 DIAGNOSIS — E23.7 DISORDER OF PITUITARY GLAND, UNSPECIFIED: ICD-10-CM

## 2021-03-17 ENCOUNTER — HOSPITAL ENCOUNTER (OUTPATIENT)
Dept: RADIOLOGY | Facility: HOSPITAL | Age: 36
Discharge: HOME | End: 2021-03-17
Attending: INTERNAL MEDICINE
Payer: COMMERCIAL

## 2021-03-17 DIAGNOSIS — E23.7 DISORDER OF PITUITARY GLAND, UNSPECIFIED: ICD-10-CM

## 2021-03-17 LAB — A-PGH SER-MCNC: 0.6 NG/ML

## 2021-03-17 PROCEDURE — 25500020 PHARM REV CODE 255: Performed by: INTERNAL MEDICINE

## 2021-03-17 PROCEDURE — 70553 MRI BRAIN STEM W/O & W/DYE: CPT | Mod: TC

## 2021-03-17 PROCEDURE — A9585 GADOBUTROL INJECTION: HCPCS | Performed by: INTERNAL MEDICINE

## 2021-03-17 PROCEDURE — 70553 MRI BRAIN STEM W/O & W/DYE: CPT | Mod: 26,,, | Performed by: RADIOLOGY

## 2021-03-17 PROCEDURE — 70553 MRI PITUITARY W W/O CONTRAST: ICD-10-PCS | Mod: 26,,, | Performed by: RADIOLOGY

## 2021-03-17 RX ORDER — GADOBUTROL 604.72 MG/ML
6 INJECTION INTRAVENOUS
Status: COMPLETED | OUTPATIENT
Start: 2021-03-17 | End: 2021-03-17

## 2021-03-17 RX ADMIN — GADOBUTROL 6 ML: 604.72 INJECTION INTRAVENOUS at 05:03

## 2021-03-20 LAB
IGF-I SERPL-MCNC: 115 NG/ML (ref 48–292)
IGF-I Z-SCORE SERPL: -0.52 SD

## 2021-03-30 ENCOUNTER — TELEPHONE (OUTPATIENT)
Dept: ENDOCRINOLOGY | Facility: CLINIC | Age: 36
End: 2021-03-30

## 2021-04-13 ENCOUNTER — OFFICE VISIT (OUTPATIENT)
Dept: ENDOCRINOLOGY | Facility: CLINIC | Age: 36
End: 2021-04-13
Payer: COMMERCIAL

## 2021-04-13 ENCOUNTER — PATIENT MESSAGE (OUTPATIENT)
Dept: UROLOGY | Facility: CLINIC | Age: 36
End: 2021-04-13

## 2021-04-13 VITALS
WEIGHT: 258.38 LBS | SYSTOLIC BLOOD PRESSURE: 146 MMHG | OXYGEN SATURATION: 99 % | BODY MASS INDEX: 40.47 KG/M2 | HEART RATE: 57 BPM | DIASTOLIC BLOOD PRESSURE: 98 MMHG | RESPIRATION RATE: 16 BRPM

## 2021-04-13 DIAGNOSIS — R79.89 ELEVATED PROLACTIN LEVEL: Primary | ICD-10-CM

## 2021-04-13 DIAGNOSIS — R79.89 HIGH SERUM FOLLICLE STIMULATING HORMONE (FSH): ICD-10-CM

## 2021-04-13 PROCEDURE — 99214 OFFICE O/P EST MOD 30 MIN: CPT | Mod: S$GLB,,, | Performed by: INTERNAL MEDICINE

## 2021-04-13 PROCEDURE — 99214 PR OFFICE/OUTPT VISIT, EST, LEVL IV, 30-39 MIN: ICD-10-PCS | Mod: S$GLB,,, | Performed by: INTERNAL MEDICINE

## 2021-04-13 PROCEDURE — 99999 PR PBB SHADOW E&M-EST. PATIENT-LVL III: ICD-10-PCS | Mod: PBBFAC,,, | Performed by: INTERNAL MEDICINE

## 2021-04-13 PROCEDURE — 99999 PR PBB SHADOW E&M-EST. PATIENT-LVL III: CPT | Mod: PBBFAC,,, | Performed by: INTERNAL MEDICINE

## 2021-04-13 RX ORDER — CABERGOLINE 0.5 MG/1
0.25 TABLET ORAL WEEKLY
Qty: 8 TABLET | Refills: 2 | Status: SHIPPED | OUTPATIENT
Start: 2021-04-13 | End: 2022-01-03

## 2021-05-06 ENCOUNTER — PATIENT MESSAGE (OUTPATIENT)
Dept: UROLOGY | Facility: CLINIC | Age: 36
End: 2021-05-06

## 2021-05-10 ENCOUNTER — OFFICE VISIT (OUTPATIENT)
Dept: UROLOGY | Facility: CLINIC | Age: 36
End: 2021-05-10
Payer: COMMERCIAL

## 2021-05-10 VITALS
HEIGHT: 68 IN | WEIGHT: 256.81 LBS | SYSTOLIC BLOOD PRESSURE: 155 MMHG | BODY MASS INDEX: 38.92 KG/M2 | DIASTOLIC BLOOD PRESSURE: 102 MMHG | HEART RATE: 69 BPM

## 2021-05-10 DIAGNOSIS — E29.1 TESTICULAR FAILURE: Primary | ICD-10-CM

## 2021-05-10 PROBLEM — R79.89 HIGH SERUM FOLLICLE STIMULATING HORMONE (FSH): Status: RESOLVED | Noted: 2021-03-11 | Resolved: 2021-05-10

## 2021-05-10 PROCEDURE — 1126F AMNT PAIN NOTED NONE PRSNT: CPT | Mod: S$GLB,,, | Performed by: UROLOGY

## 2021-05-10 PROCEDURE — 3008F BODY MASS INDEX DOCD: CPT | Mod: CPTII,S$GLB,, | Performed by: UROLOGY

## 2021-05-10 PROCEDURE — 99999 PR PBB SHADOW E&M-EST. PATIENT-LVL III: CPT | Mod: PBBFAC,,, | Performed by: UROLOGY

## 2021-05-10 PROCEDURE — 1126F PR PAIN SEVERITY QUANTIFIED, NO PAIN PRESENT: ICD-10-PCS | Mod: S$GLB,,, | Performed by: UROLOGY

## 2021-05-10 PROCEDURE — 99999 PR PBB SHADOW E&M-EST. PATIENT-LVL III: ICD-10-PCS | Mod: PBBFAC,,, | Performed by: UROLOGY

## 2021-05-10 PROCEDURE — 99215 PR OFFICE/OUTPT VISIT, EST, LEVL V, 40-54 MIN: ICD-10-PCS | Mod: S$GLB,,, | Performed by: UROLOGY

## 2021-05-10 PROCEDURE — 3008F PR BODY MASS INDEX (BMI) DOCUMENTED: ICD-10-PCS | Mod: CPTII,S$GLB,, | Performed by: UROLOGY

## 2021-05-10 PROCEDURE — 99215 OFFICE O/P EST HI 40 MIN: CPT | Mod: S$GLB,,, | Performed by: UROLOGY

## 2021-05-24 ENCOUNTER — LAB VISIT (OUTPATIENT)
Dept: LAB | Facility: HOSPITAL | Age: 36
End: 2021-05-24
Attending: UROLOGY
Payer: COMMERCIAL

## 2021-05-24 DIAGNOSIS — E29.1 TESTICULAR FAILURE: ICD-10-CM

## 2021-05-24 PROCEDURE — 36415 COLL VENOUS BLD VENIPUNCTURE: CPT | Performed by: UROLOGY

## 2021-05-24 PROCEDURE — 81403 MOPATH PROCEDURE LEVEL 4: CPT | Performed by: UROLOGY

## 2021-05-24 PROCEDURE — 88262 CHROMOSOME ANALYSIS 15-20: CPT | Performed by: UROLOGY

## 2021-05-24 PROCEDURE — 88230 TISSUE CULTURE LYMPHOCYTE: CPT | Performed by: UROLOGY

## 2021-05-26 DIAGNOSIS — M25.571 RIGHT ANKLE PAIN, UNSPECIFIED CHRONICITY: Primary | ICD-10-CM

## 2021-05-27 ENCOUNTER — OFFICE VISIT (OUTPATIENT)
Dept: ORTHOPEDICS | Facility: CLINIC | Age: 36
End: 2021-05-27
Payer: COMMERCIAL

## 2021-05-27 VITALS
HEART RATE: 52 BPM | OXYGEN SATURATION: 98 % | DIASTOLIC BLOOD PRESSURE: 100 MMHG | WEIGHT: 256.19 LBS | HEIGHT: 68 IN | BODY MASS INDEX: 38.83 KG/M2 | SYSTOLIC BLOOD PRESSURE: 140 MMHG | RESPIRATION RATE: 18 BRPM

## 2021-05-27 DIAGNOSIS — M72.2 PLANTAR FASCIITIS OF RIGHT FOOT: Primary | ICD-10-CM

## 2021-05-27 PROCEDURE — 1125F PR PAIN SEVERITY QUANTIFIED, PAIN PRESENT: ICD-10-PCS | Mod: S$GLB,,, | Performed by: ORTHOPAEDIC SURGERY

## 2021-05-27 PROCEDURE — 99999 PR PBB SHADOW E&M-EST. PATIENT-LVL III: ICD-10-PCS | Mod: PBBFAC,,, | Performed by: ORTHOPAEDIC SURGERY

## 2021-05-27 PROCEDURE — 99999 PR PBB SHADOW E&M-EST. PATIENT-LVL III: CPT | Mod: PBBFAC,,, | Performed by: ORTHOPAEDIC SURGERY

## 2021-05-27 PROCEDURE — 99204 OFFICE O/P NEW MOD 45 MIN: CPT | Mod: S$GLB,,, | Performed by: ORTHOPAEDIC SURGERY

## 2021-05-27 PROCEDURE — 1125F AMNT PAIN NOTED PAIN PRSNT: CPT | Mod: S$GLB,,, | Performed by: ORTHOPAEDIC SURGERY

## 2021-05-27 PROCEDURE — 3008F PR BODY MASS INDEX (BMI) DOCUMENTED: ICD-10-PCS | Mod: CPTII,S$GLB,, | Performed by: ORTHOPAEDIC SURGERY

## 2021-05-27 PROCEDURE — 99204 PR OFFICE/OUTPT VISIT, NEW, LEVL IV, 45-59 MIN: ICD-10-PCS | Mod: S$GLB,,, | Performed by: ORTHOPAEDIC SURGERY

## 2021-05-27 PROCEDURE — 3008F BODY MASS INDEX DOCD: CPT | Mod: CPTII,S$GLB,, | Performed by: ORTHOPAEDIC SURGERY

## 2021-05-27 RX ORDER — MELOXICAM 15 MG/1
15 TABLET ORAL DAILY
Qty: 30 TABLET | Refills: 0 | Status: SHIPPED | OUTPATIENT
Start: 2021-05-27 | End: 2021-06-25 | Stop reason: SDUPTHER

## 2021-06-03 LAB
CHROM BANDING METHOD: NORMAL
CHROMOSOME ANALYSIS CONGENITAL ADDITIONAL INFORMATION: NORMAL
CHROMOSOME ANALYSIS CONGENITAL RELEASED BY: NORMAL
CHROMOSOME ANALYSIS CONGENITAL RESULT SUMMARY: NORMAL
CLINICAL CYTOGENETICIST REVIEW: NORMAL
GENETICIST REVIEW: NORMAL
KARYOTYP SPEC: NORMAL
REASON FOR REFERRAL, CHROMOSOME ANALYSIS CONGENITAL: NORMAL
REF LAB TEST METHOD: NORMAL
SPECIMEN SOURCE: NORMAL
SPECIMEN SOURCE: NORMAL
SPECIMEN, CHROMOSOME ANALYSIS CONGENITAL: NORMAL
Y CHROM DEL BLD/T: NORMAL
Y MICRODELETION RELEASED BY: NORMAL
Y MICRODELETION RESULT SUMMARY: NEGATIVE
Y MICRODELETION SPECIMEN: NORMAL

## 2021-06-25 RX ORDER — MELOXICAM 15 MG/1
15 TABLET ORAL DAILY
Qty: 30 TABLET | Refills: 0 | Status: SHIPPED | OUTPATIENT
Start: 2021-06-25 | End: 2022-01-03

## 2021-10-04 ENCOUNTER — PATIENT MESSAGE (OUTPATIENT)
Dept: ADMINISTRATIVE | Facility: HOSPITAL | Age: 36
End: 2021-10-04

## 2021-11-09 ENCOUNTER — NURSE TRIAGE (OUTPATIENT)
Dept: ADMINISTRATIVE | Facility: CLINIC | Age: 36
End: 2021-11-09
Payer: COMMERCIAL

## 2021-11-10 ENCOUNTER — HOSPITAL ENCOUNTER (EMERGENCY)
Facility: HOSPITAL | Age: 36
Discharge: HOME | End: 2021-11-10
Attending: EMERGENCY MEDICINE
Payer: COMMERCIAL

## 2021-11-10 VITALS
DIASTOLIC BLOOD PRESSURE: 91 MMHG | BODY MASS INDEX: 35.61 KG/M2 | TEMPERATURE: 98 F | WEIGHT: 235 LBS | OXYGEN SATURATION: 100 % | HEART RATE: 59 BPM | SYSTOLIC BLOOD PRESSURE: 187 MMHG | HEIGHT: 68 IN | RESPIRATION RATE: 18 BRPM

## 2021-11-10 DIAGNOSIS — R03.0 ELEVATED BLOOD PRESSURE READING: Primary | ICD-10-CM

## 2021-11-10 DIAGNOSIS — H43.11 VITREOUS HEMORRHAGE OF RIGHT EYE: ICD-10-CM

## 2021-11-10 LAB
ALBUMIN SERPL BCP-MCNC: 4 G/DL (ref 3.5–5.2)
ALP SERPL-CCNC: 107 U/L (ref 55–135)
ALT SERPL W/O P-5'-P-CCNC: 23 U/L (ref 10–44)
ANION GAP SERPL CALC-SCNC: 10 MMOL/L (ref 8–16)
AST SERPL-CCNC: 26 U/L (ref 10–40)
BASOPHILS # BLD AUTO: 0.01 K/UL (ref 0–0.2)
BASOPHILS NFR BLD: 0.1 % (ref 0–1.9)
BILIRUB SERPL-MCNC: 0.8 MG/DL (ref 0.1–1)
BUN SERPL-MCNC: 15 MG/DL (ref 6–20)
CALCIUM SERPL-MCNC: 9.7 MG/DL (ref 8.7–10.5)
CHLORIDE SERPL-SCNC: 110 MMOL/L (ref 95–110)
CO2 SERPL-SCNC: 25 MMOL/L (ref 23–29)
CREAT SERPL-MCNC: 1 MG/DL (ref 0.5–1.4)
DIFFERENTIAL METHOD: ABNORMAL
EOSINOPHIL # BLD AUTO: 0 K/UL (ref 0–0.5)
EOSINOPHIL NFR BLD: 0.4 % (ref 0–8)
ERYTHROCYTE [DISTWIDTH] IN BLOOD BY AUTOMATED COUNT: 12.6 % (ref 11.5–14.5)
EST. GFR  (AFRICAN AMERICAN): >60 ML/MIN/1.73 M^2
EST. GFR  (NON AFRICAN AMERICAN): >60 ML/MIN/1.73 M^2
GLUCOSE SERPL-MCNC: 88 MG/DL (ref 70–110)
HCT VFR BLD AUTO: 42.6 % (ref 40–54)
HGB BLD-MCNC: 14.5 G/DL (ref 14–18)
IMM GRANULOCYTES # BLD AUTO: 0.01 K/UL (ref 0–0.04)
IMM GRANULOCYTES NFR BLD AUTO: 0.1 % (ref 0–0.5)
LYMPHOCYTES # BLD AUTO: 3 K/UL (ref 1–4.8)
LYMPHOCYTES NFR BLD: 35.4 % (ref 18–48)
MCH RBC QN AUTO: 31.7 PG (ref 27–31)
MCHC RBC AUTO-ENTMCNC: 34 G/DL (ref 32–36)
MCV RBC AUTO: 93 FL (ref 82–98)
MONOCYTES # BLD AUTO: 0.7 K/UL (ref 0.3–1)
MONOCYTES NFR BLD: 8.3 % (ref 4–15)
NEUTROPHILS # BLD AUTO: 4.7 K/UL (ref 1.8–7.7)
NEUTROPHILS NFR BLD: 55.7 % (ref 38–73)
NRBC BLD-RTO: 0 /100 WBC
PLATELET # BLD AUTO: 248 K/UL (ref 150–450)
PMV BLD AUTO: 10.2 FL (ref 9.2–12.9)
POTASSIUM SERPL-SCNC: 4.2 MMOL/L (ref 3.5–5.1)
PROT SERPL-MCNC: 7 G/DL (ref 6–8.4)
RBC # BLD AUTO: 4.57 M/UL (ref 4.6–6.2)
SODIUM SERPL-SCNC: 145 MMOL/L (ref 136–145)
WBC # BLD AUTO: 8.48 K/UL (ref 3.9–12.7)

## 2021-11-10 PROCEDURE — 93010 EKG 12-LEAD: ICD-10-PCS | Mod: ,,, | Performed by: INTERNAL MEDICINE

## 2021-11-10 PROCEDURE — 85025 COMPLETE CBC W/AUTO DIFF WBC: CPT | Performed by: PHYSICIAN ASSISTANT

## 2021-11-10 PROCEDURE — 80053 COMPREHEN METABOLIC PANEL: CPT | Performed by: PHYSICIAN ASSISTANT

## 2021-11-10 PROCEDURE — 93010 ELECTROCARDIOGRAM REPORT: CPT | Mod: ,,, | Performed by: INTERNAL MEDICINE

## 2021-11-10 PROCEDURE — 93005 ELECTROCARDIOGRAM TRACING: CPT

## 2021-11-10 PROCEDURE — 99284 EMERGENCY DEPT VISIT MOD MDM: CPT | Mod: 25

## 2021-11-10 RX ORDER — AMLODIPINE BESYLATE 5 MG/1
10 TABLET ORAL DAILY
Qty: 30 TABLET | Refills: 2 | Status: SHIPPED | OUTPATIENT
Start: 2021-11-10 | End: 2023-02-27

## 2021-11-11 ENCOUNTER — HOSPITAL ENCOUNTER (EMERGENCY)
Facility: HOSPITAL | Age: 36
Discharge: HOME | End: 2021-11-11
Attending: INTERNAL MEDICINE
Payer: COMMERCIAL

## 2021-11-11 VITALS
BODY MASS INDEX: 35.61 KG/M2 | TEMPERATURE: 98 F | OXYGEN SATURATION: 100 % | DIASTOLIC BLOOD PRESSURE: 88 MMHG | WEIGHT: 235 LBS | HEART RATE: 62 BPM | HEIGHT: 68 IN | SYSTOLIC BLOOD PRESSURE: 179 MMHG | RESPIRATION RATE: 22 BRPM

## 2021-11-11 DIAGNOSIS — R07.9 CHEST PAIN: ICD-10-CM

## 2021-11-11 DIAGNOSIS — I10 ESSENTIAL HYPERTENSION: Primary | ICD-10-CM

## 2021-11-11 LAB
ALBUMIN SERPL BCP-MCNC: 4.1 G/DL (ref 3.5–5.2)
ALP SERPL-CCNC: 112 U/L (ref 55–135)
ALT SERPL W/O P-5'-P-CCNC: 23 U/L (ref 10–44)
ANION GAP SERPL CALC-SCNC: 9 MMOL/L (ref 8–16)
AST SERPL-CCNC: 23 U/L (ref 10–40)
BASOPHILS # BLD AUTO: 0.01 K/UL (ref 0–0.2)
BASOPHILS NFR BLD: 0.1 % (ref 0–1.9)
BILIRUB SERPL-MCNC: 1 MG/DL (ref 0.1–1)
BNP SERPL-MCNC: 26 PG/ML (ref 0–99)
BUN SERPL-MCNC: 15 MG/DL (ref 6–20)
CALCIUM SERPL-MCNC: 9.6 MG/DL (ref 8.7–10.5)
CHLORIDE SERPL-SCNC: 106 MMOL/L (ref 95–110)
CO2 SERPL-SCNC: 27 MMOL/L (ref 23–29)
CREAT SERPL-MCNC: 1 MG/DL (ref 0.5–1.4)
DIFFERENTIAL METHOD: ABNORMAL
EOSINOPHIL # BLD AUTO: 0 K/UL (ref 0–0.5)
EOSINOPHIL NFR BLD: 0.2 % (ref 0–8)
ERYTHROCYTE [DISTWIDTH] IN BLOOD BY AUTOMATED COUNT: 12.5 % (ref 11.5–14.5)
EST. GFR  (AFRICAN AMERICAN): >60 ML/MIN/1.73 M^2
EST. GFR  (NON AFRICAN AMERICAN): >60 ML/MIN/1.73 M^2
GLUCOSE SERPL-MCNC: 83 MG/DL (ref 70–110)
HCT VFR BLD AUTO: 42.9 % (ref 40–54)
HGB BLD-MCNC: 14.8 G/DL (ref 14–18)
IMM GRANULOCYTES # BLD AUTO: 0.03 K/UL (ref 0–0.04)
IMM GRANULOCYTES NFR BLD AUTO: 0.4 % (ref 0–0.5)
LYMPHOCYTES # BLD AUTO: 1.6 K/UL (ref 1–4.8)
LYMPHOCYTES NFR BLD: 19 % (ref 18–48)
MCH RBC QN AUTO: 31.4 PG (ref 27–31)
MCHC RBC AUTO-ENTMCNC: 34.5 G/DL (ref 32–36)
MCV RBC AUTO: 91 FL (ref 82–98)
MONOCYTES # BLD AUTO: 0.6 K/UL (ref 0.3–1)
MONOCYTES NFR BLD: 7 % (ref 4–15)
NEUTROPHILS # BLD AUTO: 6 K/UL (ref 1.8–7.7)
NEUTROPHILS NFR BLD: 73.3 % (ref 38–73)
NRBC BLD-RTO: 0 /100 WBC
PLATELET # BLD AUTO: 267 K/UL (ref 150–450)
PMV BLD AUTO: 10.3 FL (ref 9.2–12.9)
POTASSIUM SERPL-SCNC: 4.2 MMOL/L (ref 3.5–5.1)
PROT SERPL-MCNC: 7.4 G/DL (ref 6–8.4)
RBC # BLD AUTO: 4.71 M/UL (ref 4.6–6.2)
SODIUM SERPL-SCNC: 142 MMOL/L (ref 136–145)
TROPONIN I SERPL DL<=0.01 NG/ML-MCNC: 0.01 NG/ML (ref 0–0.03)
TROPONIN I SERPL DL<=0.01 NG/ML-MCNC: 0.01 NG/ML (ref 0–0.03)
WBC # BLD AUTO: 8.19 K/UL (ref 3.9–12.7)

## 2021-11-11 PROCEDURE — 83880 ASSAY OF NATRIURETIC PEPTIDE: CPT | Performed by: PHYSICIAN ASSISTANT

## 2021-11-11 PROCEDURE — 85025 COMPLETE CBC W/AUTO DIFF WBC: CPT | Performed by: PHYSICIAN ASSISTANT

## 2021-11-11 PROCEDURE — 25000003 PHARM REV CODE 250: Performed by: PHYSICIAN ASSISTANT

## 2021-11-11 PROCEDURE — 93005 ELECTROCARDIOGRAM TRACING: CPT

## 2021-11-11 PROCEDURE — 99285 EMERGENCY DEPT VISIT HI MDM: CPT | Mod: 25

## 2021-11-11 PROCEDURE — 93010 ELECTROCARDIOGRAM REPORT: CPT | Mod: ,,, | Performed by: INTERNAL MEDICINE

## 2021-11-11 PROCEDURE — 93010 EKG 12-LEAD: ICD-10-PCS | Mod: ,,, | Performed by: INTERNAL MEDICINE

## 2021-11-11 PROCEDURE — 36000 PLACE NEEDLE IN VEIN: CPT

## 2021-11-11 PROCEDURE — 84484 ASSAY OF TROPONIN QUANT: CPT | Performed by: PHYSICIAN ASSISTANT

## 2021-11-11 PROCEDURE — 80053 COMPREHEN METABOLIC PANEL: CPT | Performed by: PHYSICIAN ASSISTANT

## 2021-11-11 RX ORDER — PROPARACAINE HYDROCHLORIDE 5 MG/ML
1 SOLUTION/ DROPS OPHTHALMIC
Status: COMPLETED | OUTPATIENT
Start: 2021-11-11 | End: 2021-11-11

## 2021-11-11 RX ORDER — ASPIRIN 325 MG
325 TABLET ORAL
Status: COMPLETED | OUTPATIENT
Start: 2021-11-11 | End: 2021-11-11

## 2021-11-11 RX ADMIN — FLUORESCEIN SODIUM 1 EACH: 1 STRIP OPHTHALMIC at 07:11

## 2021-11-11 RX ADMIN — PROPARACAINE HYDROCHLORIDE 1 DROP: 5 SOLUTION/ DROPS OPHTHALMIC at 07:11

## 2021-11-11 RX ADMIN — ASPIRIN 325 MG ORAL TABLET 325 MG: 325 PILL ORAL at 07:11

## 2021-12-16 ENCOUNTER — OFFICE VISIT (OUTPATIENT)
Dept: FAMILY MEDICINE | Facility: CLINIC | Age: 36
End: 2021-12-16
Payer: COMMERCIAL

## 2021-12-16 VITALS
RESPIRATION RATE: 20 BRPM | DIASTOLIC BLOOD PRESSURE: 100 MMHG | TEMPERATURE: 98 F | HEART RATE: 64 BPM | SYSTOLIC BLOOD PRESSURE: 128 MMHG | WEIGHT: 240.31 LBS | BODY MASS INDEX: 36.42 KG/M2 | OXYGEN SATURATION: 98 % | HEIGHT: 68 IN

## 2021-12-16 DIAGNOSIS — I10 UNCONTROLLED HYPERTENSION: ICD-10-CM

## 2021-12-16 DIAGNOSIS — Z23 NEED FOR TD VACCINE: ICD-10-CM

## 2021-12-16 DIAGNOSIS — Z23 NEED FOR PROPHYLACTIC VACCINATION USING TETANUS AND DIPHTHERIA TOXOIDS ADSORBED (TD) VACCINE: ICD-10-CM

## 2021-12-16 DIAGNOSIS — Z00.00 ANNUAL PHYSICAL EXAM: Primary | ICD-10-CM

## 2021-12-16 PROCEDURE — 90471 TD VACCINE GREATER THAN OR EQUAL TO 7YO PRESERVATIVE FREE IM: ICD-10-PCS | Mod: S$GLB,,, | Performed by: FAMILY MEDICINE

## 2021-12-16 PROCEDURE — 3008F BODY MASS INDEX DOCD: CPT | Mod: CPTII,S$GLB,, | Performed by: FAMILY MEDICINE

## 2021-12-16 PROCEDURE — 99395 PREV VISIT EST AGE 18-39: CPT | Mod: 25,S$GLB,, | Performed by: FAMILY MEDICINE

## 2021-12-16 PROCEDURE — 3080F DIAST BP >= 90 MM HG: CPT | Mod: CPTII,S$GLB,, | Performed by: FAMILY MEDICINE

## 2021-12-16 PROCEDURE — 3008F PR BODY MASS INDEX (BMI) DOCUMENTED: ICD-10-PCS | Mod: CPTII,S$GLB,, | Performed by: FAMILY MEDICINE

## 2021-12-16 PROCEDURE — 3074F SYST BP LT 130 MM HG: CPT | Mod: CPTII,S$GLB,, | Performed by: FAMILY MEDICINE

## 2021-12-16 PROCEDURE — 99999 PR PBB SHADOW E&M-EST. PATIENT-LVL III: CPT | Mod: PBBFAC,,, | Performed by: FAMILY MEDICINE

## 2021-12-16 PROCEDURE — 3074F PR MOST RECENT SYSTOLIC BLOOD PRESSURE < 130 MM HG: ICD-10-PCS | Mod: CPTII,S$GLB,, | Performed by: FAMILY MEDICINE

## 2021-12-16 PROCEDURE — 90714 TD VACC NO PRESV 7 YRS+ IM: CPT | Mod: S$GLB,,, | Performed by: FAMILY MEDICINE

## 2021-12-16 PROCEDURE — 90471 IMMUNIZATION ADMIN: CPT | Mod: S$GLB,,, | Performed by: FAMILY MEDICINE

## 2021-12-16 PROCEDURE — 99999 PR PBB SHADOW E&M-EST. PATIENT-LVL III: ICD-10-PCS | Mod: PBBFAC,,, | Performed by: FAMILY MEDICINE

## 2021-12-16 PROCEDURE — 3080F PR MOST RECENT DIASTOLIC BLOOD PRESSURE >= 90 MM HG: ICD-10-PCS | Mod: CPTII,S$GLB,, | Performed by: FAMILY MEDICINE

## 2021-12-16 PROCEDURE — 90714 TD VACCINE GREATER THAN OR EQUAL TO 7YO PRESERVATIVE FREE IM: ICD-10-PCS | Mod: S$GLB,,, | Performed by: FAMILY MEDICINE

## 2021-12-16 PROCEDURE — 99395 PR PREVENTIVE VISIT,EST,18-39: ICD-10-PCS | Mod: 25,S$GLB,, | Performed by: FAMILY MEDICINE

## 2021-12-16 RX ORDER — HYDROCHLOROTHIAZIDE 12.5 MG/1
12.5 TABLET ORAL DAILY
Qty: 90 TABLET | Refills: 1 | Status: SHIPPED | OUTPATIENT
Start: 2021-12-16 | End: 2022-06-10

## 2021-12-16 NOTE — PATIENT INSTRUCTIONS
"Patient Education       Low Salt Diet   About this topic   Sodium is a type of mineral found in many foods. It may also be called "salt." Sodium helps balance fluids in your body. Too much sodium may be bad for your health. You may have to limit the amount of sodium in your food.  Salt is known as sodium chloride. It is measured in grams (g) or milligrams (mg). Salt or sodium in our diet comes from 3 main sources:  · Some sodium is naturally found in food.  · We may add salt to our food when we eat or cook.  · Processed foods give us most of the sodium in our diet.         What will the results be?   This diet may help lower your blood pressure. It may also help reduce extra water in your body. This may help kidney, heart, or liver problems.  What changes to diet are needed?   You need to know how much sodium is in the food you eat. Read food labels with care. Choose foods that have 5% or less sodium in one serving. Remember, if you eat more than one serving, you will be getting more sodium. It may take a while for your sense of taste to get used to food with less sodium. Be patient with yourself. You may be surprised at how well you will do.  Try to aim for a diet that has 2,300 mg (2.3 g) or less sodium in it each day. The Food & Drug Administration (FDA) has set up guidelines for food labels. These will help you make healthy choices. Look for these terms on food packages:  · Sodium-free: Less than 5 mg in each serving. These are safe to eat.  · Very low sodium: 35 mg of sodium or less in each serving. These are safe to eat.  · Low sodium: 140 mg of sodium or less in each serving. You need to eat these with care.  · Reduced sodium: At least 25% less sodium than is most often found in each serving. These foods can still be high in sodium.  · Light in sodium: 50% less sodium in each serving.  · Unsalted, no added salt, and without added salt: No salt is added during processing, but the food may still have sodium. " Read the food label and check the sodium content before eating.  What foods are good to eat?   You can control the amount of sodium in foods you make at home. Fresh foods that you cook are most often lower in sodium.  · Regular bread, unsalted crackers, dry cereal, cooked rice, pasta, quinoa, and corn tortillas.  · Fresh, frozen, low sodium, or salt-free canned vegetables. Limit vegetable juice or tomato juice to 1/2 cup (120 mL) each day.  · Fresh, frozen, canned, or dried fruit without salt added  · Nonfat or low-fat milk and yogurt, low-sodium cottage cheese, and other cheeses low in sodium.  · Fresh or frozen beef, veal, lamb, pork, poultry, fish, shellfish  · Low sodium canned meats, frozen dinners with less than 600 mg sodium  · Corn, safflower, sunflower, and soybean oils and unsalted nuts and seeds  · Egg and egg substitutes without added sodium  · Dried peas, beans, and low-sodium peanut butter  · All plain oils and low-sodium salad dressing  · Low-sodium broths, soups, soy sauce, condiments, and snack foods  · Pepper, herbs, spices, vinegar, lemon or lime juice  · Low-sodium carbonated drinks  What foods should be limited or avoided?   Foods that are prepackaged or canned are most often high in sodium. Foods to limit or avoid include:  · Salted breads, rolls, crackers, biscuits, cornbread  · Quick breads, self-rising flours, biscuit mixes, regular bread crumbs, instant hot cereals  · Commercially prepared rice, pasta, or stuffing mixes; potatoes and vegetable mixes  · Regular canned vegetables and juices, vegetables with sauce, and pickled vegetables  · Frozen vegetables with seasonings and sauces  · Processed fruits with salt or sodium  · Malted and chocolate milk and buttermilk  · Regular and processed cheese and spreads  · Smoked, cured, salted, or canned meat, fish, or poultry such as villalta, sausages, sardines, chipped beef, hot dogs, cold cuts, and frozen breaded meats  · Salted and canned peas,  beans, and olives  · Salted snack foods and nuts  · Oils mixed with high-sodium parts such as salad dressing  · Meat tenderizers, seasoning salt, and most flavored vinegars  · Ketchup, bouillon cubes, salt, sea salt, kosher salt, onion salt, garlic salt, and pink Himalayan salt  What can be done to prevent this health problem?   Check with your doctor before using salt substitutes. Also, check the labels when taking over-the-counter (OTC) drugs such as laxatives and antacids. These can have a high sodium content.  When do I need to call the doctor?   Call your doctor if you have questions about this diet. Talk to a dietitian. They can help you find hidden sources of sodium in the food you eat.  Helpful tips   · Use the nutrition facts labels as a guide to look for foods lower in sodium.  · Do not use salt when eating or cooking.  · Select fresh fruits and vegetables for snacks.  · If you are eating out, ask the  to cook your food without salt, or choose foods without sauces.  · Season your food with herbs and spices.  · Drain and rinse canned beans and vegetables that contain sodium.  · Eat foods with potassium. Potassium helps counter the effects of sodium. This may help lower your blood pressure. Foods high in potassium include: Potatoes, tomatoes, bananas, oranges, cantaloupe, beans, and greens.  Where can I learn more?   American Heart Association  https://www.heart.org/en/healthy-living/healthy-eating/eat-smart/sodium/how-to-reduce-sodium   American Heart Association  https://www.heart.org/en/healthy-living/healthy-eating/eat-smart/nutrition-basics/food-packaging-claims   NHS  https://www.nhs.uk/live-well/eat-well/tips-for-a-lower-salt-diet/   UpToDate  http://www.Innohat.rubberit/contents/low-sodium-diet-beyond-the-basics   Last Reviewed Date   2021-10-11  Consumer Information Use and Disclaimer   This information is not specific medical advice and does not replace information you receive from your health care  provider. This is only a brief summary of general information. It does NOT include all information about conditions, illnesses, injuries, tests, procedures, treatments, therapies, discharge instructions or life-style choices that may apply to you. You must talk with your health care provider for complete information about your health and treatment options. This information should not be used to decide whether or not to accept your health care providers advice, instructions or recommendations. Only your health care provider has the knowledge and training to provide advice that is right for you.  Copyright   Copyright © 2021 UpToDate, Inc. and its affiliates and/or licensors. All rights reserved.

## 2021-12-21 ENCOUNTER — LAB VISIT (OUTPATIENT)
Dept: LAB | Facility: HOSPITAL | Age: 36
End: 2021-12-21
Attending: FAMILY MEDICINE
Payer: COMMERCIAL

## 2021-12-21 DIAGNOSIS — Z00.00 ANNUAL PHYSICAL EXAM: ICD-10-CM

## 2021-12-21 PROCEDURE — 84443 ASSAY THYROID STIM HORMONE: CPT | Performed by: FAMILY MEDICINE

## 2021-12-21 PROCEDURE — 83036 HEMOGLOBIN GLYCOSYLATED A1C: CPT | Performed by: FAMILY MEDICINE

## 2021-12-21 PROCEDURE — 80061 LIPID PANEL: CPT | Performed by: FAMILY MEDICINE

## 2021-12-21 PROCEDURE — 36415 COLL VENOUS BLD VENIPUNCTURE: CPT | Mod: PO | Performed by: FAMILY MEDICINE

## 2021-12-21 PROCEDURE — 85025 COMPLETE CBC W/AUTO DIFF WBC: CPT | Performed by: FAMILY MEDICINE

## 2021-12-21 PROCEDURE — 80053 COMPREHEN METABOLIC PANEL: CPT | Performed by: FAMILY MEDICINE

## 2021-12-22 LAB
ALBUMIN SERPL BCP-MCNC: 4 G/DL (ref 3.5–5.2)
ALP SERPL-CCNC: 109 U/L (ref 55–135)
ALT SERPL W/O P-5'-P-CCNC: 15 U/L (ref 10–44)
ANION GAP SERPL CALC-SCNC: 13 MMOL/L (ref 8–16)
AST SERPL-CCNC: 24 U/L (ref 10–40)
BASOPHILS # BLD AUTO: 0.01 K/UL (ref 0–0.2)
BASOPHILS NFR BLD: 0.1 % (ref 0–1.9)
BILIRUB SERPL-MCNC: 1.8 MG/DL (ref 0.1–1)
BUN SERPL-MCNC: 16 MG/DL (ref 6–20)
CALCIUM SERPL-MCNC: 9.5 MG/DL (ref 8.7–10.5)
CHLORIDE SERPL-SCNC: 105 MMOL/L (ref 95–110)
CHOLEST SERPL-MCNC: 195 MG/DL (ref 120–199)
CHOLEST/HDLC SERPL: 2.9 {RATIO} (ref 2–5)
CO2 SERPL-SCNC: 24 MMOL/L (ref 23–29)
CREAT SERPL-MCNC: 1 MG/DL (ref 0.5–1.4)
DIFFERENTIAL METHOD: ABNORMAL
EOSINOPHIL # BLD AUTO: 0 K/UL (ref 0–0.5)
EOSINOPHIL NFR BLD: 0.5 % (ref 0–8)
ERYTHROCYTE [DISTWIDTH] IN BLOOD BY AUTOMATED COUNT: 12.6 % (ref 11.5–14.5)
EST. GFR  (AFRICAN AMERICAN): >60 ML/MIN/1.73 M^2
EST. GFR  (NON AFRICAN AMERICAN): >60 ML/MIN/1.73 M^2
ESTIMATED AVG GLUCOSE: 97 MG/DL (ref 68–131)
GLUCOSE SERPL-MCNC: 74 MG/DL (ref 70–110)
HBA1C MFR BLD: 5 % (ref 4–5.6)
HCT VFR BLD AUTO: 41.7 % (ref 40–54)
HDLC SERPL-MCNC: 67 MG/DL (ref 40–75)
HDLC SERPL: 34.4 % (ref 20–50)
HGB BLD-MCNC: 13.8 G/DL (ref 14–18)
IMM GRANULOCYTES # BLD AUTO: 0.02 K/UL (ref 0–0.04)
IMM GRANULOCYTES NFR BLD AUTO: 0.3 % (ref 0–0.5)
LDLC SERPL CALC-MCNC: 121.4 MG/DL (ref 63–159)
LYMPHOCYTES # BLD AUTO: 2.6 K/UL (ref 1–4.8)
LYMPHOCYTES NFR BLD: 35 % (ref 18–48)
MCH RBC QN AUTO: 31.2 PG (ref 27–31)
MCHC RBC AUTO-ENTMCNC: 33.1 G/DL (ref 32–36)
MCV RBC AUTO: 94 FL (ref 82–98)
MONOCYTES # BLD AUTO: 0.6 K/UL (ref 0.3–1)
MONOCYTES NFR BLD: 7.8 % (ref 4–15)
NEUTROPHILS # BLD AUTO: 4.1 K/UL (ref 1.8–7.7)
NEUTROPHILS NFR BLD: 56.3 % (ref 38–73)
NONHDLC SERPL-MCNC: 128 MG/DL
NRBC BLD-RTO: 0 /100 WBC
PLATELET # BLD AUTO: 283 K/UL (ref 150–450)
PMV BLD AUTO: 11 FL (ref 9.2–12.9)
POTASSIUM SERPL-SCNC: 4.3 MMOL/L (ref 3.5–5.1)
PROT SERPL-MCNC: 7 G/DL (ref 6–8.4)
RBC # BLD AUTO: 4.42 M/UL (ref 4.6–6.2)
SODIUM SERPL-SCNC: 142 MMOL/L (ref 136–145)
TRIGL SERPL-MCNC: 33 MG/DL (ref 30–150)
TSH SERPL DL<=0.005 MIU/L-ACNC: 0.86 UIU/ML (ref 0.4–4)
WBC # BLD AUTO: 7.34 K/UL (ref 3.9–12.7)

## 2021-12-30 ENCOUNTER — CLINICAL SUPPORT (OUTPATIENT)
Dept: FAMILY MEDICINE | Facility: CLINIC | Age: 36
End: 2021-12-30
Payer: COMMERCIAL

## 2021-12-30 VITALS — SYSTOLIC BLOOD PRESSURE: 124 MMHG | OXYGEN SATURATION: 99 % | HEART RATE: 72 BPM | DIASTOLIC BLOOD PRESSURE: 72 MMHG

## 2021-12-30 DIAGNOSIS — I10 ELEVATED BLOOD PRESSURE READING IN OFFICE WITH DIAGNOSIS OF HYPERTENSION: Primary | ICD-10-CM

## 2021-12-30 PROCEDURE — 99999 PR PBB SHADOW E&M-EST. PATIENT-LVL I: ICD-10-PCS | Mod: PBBFAC,,,

## 2021-12-30 PROCEDURE — 99999 PR PBB SHADOW E&M-EST. PATIENT-LVL I: CPT | Mod: PBBFAC,,,

## 2022-01-03 ENCOUNTER — OFFICE VISIT (OUTPATIENT)
Dept: FAMILY MEDICINE | Facility: CLINIC | Age: 37
End: 2022-01-03
Payer: COMMERCIAL

## 2022-01-03 VITALS
BODY MASS INDEX: 36.02 KG/M2 | SYSTOLIC BLOOD PRESSURE: 118 MMHG | TEMPERATURE: 98 F | OXYGEN SATURATION: 98 % | WEIGHT: 237.63 LBS | HEART RATE: 68 BPM | HEIGHT: 68 IN | RESPIRATION RATE: 18 BRPM | DIASTOLIC BLOOD PRESSURE: 88 MMHG

## 2022-01-03 DIAGNOSIS — I10 HYPERTENSION, ESSENTIAL, BENIGN: Primary | ICD-10-CM

## 2022-01-03 PROCEDURE — 3008F BODY MASS INDEX DOCD: CPT | Mod: CPTII,S$GLB,, | Performed by: PHYSICIAN ASSISTANT

## 2022-01-03 PROCEDURE — 3008F PR BODY MASS INDEX (BMI) DOCUMENTED: ICD-10-PCS | Mod: CPTII,S$GLB,, | Performed by: PHYSICIAN ASSISTANT

## 2022-01-03 PROCEDURE — 99213 OFFICE O/P EST LOW 20 MIN: CPT | Mod: S$GLB,,, | Performed by: PHYSICIAN ASSISTANT

## 2022-01-03 PROCEDURE — 1159F MED LIST DOCD IN RCRD: CPT | Mod: CPTII,S$GLB,, | Performed by: PHYSICIAN ASSISTANT

## 2022-01-03 PROCEDURE — 99999 PR PBB SHADOW E&M-EST. PATIENT-LVL III: CPT | Mod: PBBFAC,,, | Performed by: PHYSICIAN ASSISTANT

## 2022-01-03 PROCEDURE — 3079F DIAST BP 80-89 MM HG: CPT | Mod: CPTII,S$GLB,, | Performed by: PHYSICIAN ASSISTANT

## 2022-01-03 PROCEDURE — 99213 PR OFFICE/OUTPT VISIT, EST, LEVL III, 20-29 MIN: ICD-10-PCS | Mod: S$GLB,,, | Performed by: PHYSICIAN ASSISTANT

## 2022-01-03 PROCEDURE — 3079F PR MOST RECENT DIASTOLIC BLOOD PRESSURE 80-89 MM HG: ICD-10-PCS | Mod: CPTII,S$GLB,, | Performed by: PHYSICIAN ASSISTANT

## 2022-01-03 PROCEDURE — 1160F RVW MEDS BY RX/DR IN RCRD: CPT | Mod: CPTII,S$GLB,, | Performed by: PHYSICIAN ASSISTANT

## 2022-01-03 PROCEDURE — 99999 PR PBB SHADOW E&M-EST. PATIENT-LVL III: ICD-10-PCS | Mod: PBBFAC,,, | Performed by: PHYSICIAN ASSISTANT

## 2022-01-03 PROCEDURE — 1159F PR MEDICATION LIST DOCUMENTED IN MEDICAL RECORD: ICD-10-PCS | Mod: CPTII,S$GLB,, | Performed by: PHYSICIAN ASSISTANT

## 2022-01-03 PROCEDURE — 3074F PR MOST RECENT SYSTOLIC BLOOD PRESSURE < 130 MM HG: ICD-10-PCS | Mod: CPTII,S$GLB,, | Performed by: PHYSICIAN ASSISTANT

## 2022-01-03 PROCEDURE — 1160F PR REVIEW ALL MEDS BY PRESCRIBER/CLIN PHARMACIST DOCUMENTED: ICD-10-PCS | Mod: CPTII,S$GLB,, | Performed by: PHYSICIAN ASSISTANT

## 2022-01-03 PROCEDURE — 3074F SYST BP LT 130 MM HG: CPT | Mod: CPTII,S$GLB,, | Performed by: PHYSICIAN ASSISTANT

## 2022-01-03 NOTE — PROGRESS NOTES
Subjective:       Patient ID: Rodríguez Graves is a 36 y.o. male.    Chief Complaint: Hypertension    35 yo male presents for HTN f/u. Per pt pcp stopped amlodipine and put him on hctz 12.5 mg. Pt states his BP over the past few days has been elevated at 140s-180s/100. He denies HA, SOB, CP, leg swelling. He denies taking nsaids and decongestants. FH of heart problems in his mother.    Hypertension  This is a chronic problem. The current episode started more than 1 year ago. The problem is unchanged. Pertinent negatives include no chest pain, headaches or shortness of breath. Risk factors for coronary artery disease include male gender. Past treatments include diuretics. There is no history of angina, kidney disease, CAD/MI or CVA.     Review of Systems   Eyes: Negative for visual disturbance.   Respiratory: Negative for shortness of breath.    Cardiovascular: Negative for chest pain.   Neurological: Negative for headaches.         Objective:      Physical Exam  Constitutional:       Appearance: Normal appearance.   HENT:      Head: Normocephalic and atraumatic.   Cardiovascular:      Rate and Rhythm: Normal rate and regular rhythm.   Pulmonary:      Effort: Pulmonary effort is normal.      Breath sounds: Normal breath sounds.   Neurological:      Mental Status: He is alert.   Psychiatric:         Mood and Affect: Mood normal.         Behavior: Behavior normal.         Assessment:       Problem List Items Addressed This Visit    None     Visit Diagnoses     Hypertension, essential, benign    -  Primary          Plan:         Rodríguez was seen today for hypertension.    Diagnoses and all orders for this visit:    Hypertension, essential, benign  Resume amlodipine, continue hctz  Discussed low sodium diet and increase exercise  He will continue home monitoring and notify us if remains over 140/90

## 2022-01-04 NOTE — PROGRESS NOTES
Subjective:       Patient ID: Rodríguez Graves is a 36 y.o. male.    Chief Complaint: Annual Exam and Hypertension      HPI  35 yo male presents for annual exam. Has elevated bp. Doing well otherwise. Denies any other issues at this time.    Review of Systems   Constitutional: Negative.    HENT: Negative.    Respiratory: Negative.    Cardiovascular: Negative.    Gastrointestinal: Negative.    Endocrine: Negative.    Genitourinary: Negative.    Musculoskeletal: Negative.    Neurological: Negative.    Psychiatric/Behavioral: Negative.           Past Medical History:   Diagnosis Date    Anxiety     Depression     Essential hypertension 11/11/2021    Infertility male      Past Surgical History:   Procedure Laterality Date    Urecthrotomy      WISDOM TOOTH EXTRACTION      2 extracted      Family History   Problem Relation Age of Onset    Hypertension Mother      Social History     Socioeconomic History    Marital status:    Tobacco Use    Smoking status: Former Smoker     Types: Cigarettes     Start date: 6/27/2009    Smokeless tobacco: Never Used   Substance and Sexual Activity    Alcohol use: Yes     Comment: occacionally    Drug use: No    Sexual activity: Yes     Partners: Female     Birth control/protection: None     Comment: 11/6/17  with same partner 10 years        Current Outpatient Medications:     amLODIPine (NORVASC) 5 MG tablet, Take 2 tablets (10 mg total) by mouth once daily. (Patient not taking: Reported on 1/3/2022), Disp: 30 tablet, Rfl: 2    hydroCHLOROthiazide (HYDRODIURIL) 12.5 MG Tab, Take 1 tablet (12.5 mg total) by mouth once daily., Disp: 90 tablet, Rfl: 1   Objective:      Vitals:    12/16/21 1407 12/16/21 1412   BP: (!) 130/100 (!) 128/100   BP Location: Left arm Left arm   Patient Position: Sitting Sitting   BP Method: Large (Manual) Large (Manual)   Pulse: 64    Resp: 20    Temp: 98.2 °F (36.8 °C)    TempSrc: Oral    SpO2: 98%    Weight: 109 kg (240 lb 4.8 oz)   "  Height: 5' 8" (1.727 m)        Physical Exam  Constitutional:       General: He is not in acute distress.  HENT:      Head: Normocephalic and atraumatic.   Eyes:      Conjunctiva/sclera: Conjunctivae normal.   Cardiovascular:      Rate and Rhythm: Normal rate and regular rhythm.      Heart sounds: Normal heart sounds. No murmur heard.  No friction rub. No gallop.    Pulmonary:      Effort: Pulmonary effort is normal.      Breath sounds: Normal breath sounds. No wheezing or rales.   Musculoskeletal:      Cervical back: Neck supple.   Skin:     General: Skin is warm and dry.   Neurological:      Mental Status: He is alert and oriented to person, place, and time.   Psychiatric:         Mood and Affect: Mood and affect normal.         Behavior: Behavior normal.         Thought Content: Thought content normal.         Judgment: Judgment normal.            Assessment:       1. Annual physical exam    2. Uncontrolled hypertension    3. Need for prophylactic vaccination using tetanus and diphtheria toxoids adsorbed (Td) vaccine    4. Need for Td vaccine        Plan:       Annual physical exam  -     CBC Auto Differential; Future; Expected date: 12/16/2021  -     Comprehensive Metabolic Panel; Future; Expected date: 12/16/2021  -     Hemoglobin A1C; Future; Expected date: 12/16/2021  -     Lipid Panel; Future; Expected date: 12/16/2021  -     TSH; Future; Expected date: 12/16/2021    Uncontrolled hypertension  -     hydroCHLOROthiazide (HYDRODIURIL) 12.5 MG Tab; Take 1 tablet (12.5 mg total) by mouth once daily.  Dispense: 90 tablet; Refill: 1    Need for prophylactic vaccination using tetanus and diphtheria toxoids adsorbed (Td) vaccine    Need for Td vaccine  -     (In Office Administered) Td Vaccine - Preservative Free                Patient note was created using neoSaej.  Any errors in syntax or even information may not have been identified and edited on initial review prior to signing this note.  "

## 2022-01-06 ENCOUNTER — LAB VISIT (OUTPATIENT)
Dept: PRIMARY CARE CLINIC | Facility: OTHER | Age: 37
End: 2022-01-06
Attending: INTERNAL MEDICINE
Payer: COMMERCIAL

## 2022-01-06 DIAGNOSIS — Z20.822 ENCOUNTER FOR LABORATORY TESTING FOR COVID-19 VIRUS: ICD-10-CM

## 2022-01-06 PROCEDURE — U0003 INFECTIOUS AGENT DETECTION BY NUCLEIC ACID (DNA OR RNA); SEVERE ACUTE RESPIRATORY SYNDROME CORONAVIRUS 2 (SARS-COV-2) (CORONAVIRUS DISEASE [COVID-19]), AMPLIFIED PROBE TECHNIQUE, MAKING USE OF HIGH THROUGHPUT TECHNOLOGIES AS DESCRIBED BY CMS-2020-01-R: HCPCS | Performed by: INTERNAL MEDICINE

## 2022-01-10 DIAGNOSIS — U07.1 COVID-19 VIRUS DETECTED: ICD-10-CM

## 2022-01-10 LAB — SARS-COV-2 RNA RESP QL NAA+PROBE: DETECTED

## 2022-05-07 ENCOUNTER — HOSPITAL ENCOUNTER (EMERGENCY)
Facility: HOSPITAL | Age: 37
Discharge: HOME | End: 2022-05-07
Attending: EMERGENCY MEDICINE
Payer: COMMERCIAL

## 2022-05-07 VITALS
HEIGHT: 68 IN | OXYGEN SATURATION: 100 % | HEART RATE: 80 BPM | SYSTOLIC BLOOD PRESSURE: 154 MMHG | TEMPERATURE: 99 F | BODY MASS INDEX: 34.86 KG/M2 | RESPIRATION RATE: 17 BRPM | DIASTOLIC BLOOD PRESSURE: 80 MMHG | WEIGHT: 230 LBS

## 2022-05-07 DIAGNOSIS — K52.9 GASTROENTERITIS: Primary | ICD-10-CM

## 2022-05-07 PROCEDURE — 25000003 PHARM REV CODE 250: Performed by: PHYSICIAN ASSISTANT

## 2022-05-07 PROCEDURE — 99284 EMERGENCY DEPT VISIT MOD MDM: CPT

## 2022-05-07 RX ORDER — LIDOCAINE HYDROCHLORIDE 20 MG/ML
10 SOLUTION OROPHARYNGEAL ONCE
Status: COMPLETED | OUTPATIENT
Start: 2022-05-07 | End: 2022-05-07

## 2022-05-07 RX ORDER — ONDANSETRON 4 MG/1
4 TABLET, ORALLY DISINTEGRATING ORAL
Status: COMPLETED | OUTPATIENT
Start: 2022-05-07 | End: 2022-05-07

## 2022-05-07 RX ORDER — HYOSCYAMINE SULFATE 0.125 MG
125 TABLET ORAL 3 TIMES DAILY
Qty: 20 TABLET | Refills: 0 | Status: SHIPPED | OUTPATIENT
Start: 2022-05-07 | End: 2023-03-01

## 2022-05-07 RX ORDER — ONDANSETRON 4 MG/1
4 TABLET, FILM COATED ORAL EVERY 6 HOURS PRN
Qty: 20 TABLET | Refills: 0 | Status: SHIPPED | OUTPATIENT
Start: 2022-05-07 | End: 2022-06-03

## 2022-05-07 RX ORDER — MAG HYDROX/ALUMINUM HYD/SIMETH 200-200-20
30 SUSPENSION, ORAL (FINAL DOSE FORM) ORAL ONCE
Status: COMPLETED | OUTPATIENT
Start: 2022-05-07 | End: 2022-05-07

## 2022-05-07 RX ORDER — FAMOTIDINE 20 MG/1
20 TABLET, FILM COATED ORAL 2 TIMES DAILY
Qty: 14 TABLET | Refills: 0 | Status: SHIPPED | OUTPATIENT
Start: 2022-05-07 | End: 2023-02-27

## 2022-05-07 RX ADMIN — LIDOCAINE HYDROCHLORIDE 10 ML: 20 SOLUTION ORAL; TOPICAL at 04:05

## 2022-05-07 RX ADMIN — ONDANSETRON 4 MG: 4 TABLET, ORALLY DISINTEGRATING ORAL at 04:05

## 2022-05-07 RX ADMIN — ALUMINA, MAGNESIA, AND SIMETHICONE ORAL SUSPENSION REGULAR STRENGTH 30 ML: 1200; 1200; 120 SUSPENSION ORAL at 04:05

## 2022-05-07 NOTE — ED PROVIDER NOTES
Encounter Date: 5/7/2022       History     Chief Complaint   Patient presents with    Abdominal Pain     Epigastric pain since Thursday with nausea and diarrhea. Pt reports the diarrhea started around 2200 last night. Describes the pain as a cramping pain and it gets worse with movement.      37 yo M to the ED with epigastric abdominal pain. Onset 2 days ago, intermittent. No bowel or bladder changes, no nausea or emesis. No trauma. No CP, no SOB. Appears well and non toxic. Has tried pepto @ home, no relief. Diarrhea began tonight prompting visit to the ED.         Review of patient's allergies indicates:  No Known Allergies  Past Medical History:   Diagnosis Date    Anxiety     Depression     Essential hypertension 11/11/2021    Infertility male      Past Surgical History:   Procedure Laterality Date    Urecthrotomy      WISDOM TOOTH EXTRACTION      2 extracted      Family History   Problem Relation Age of Onset    Hypertension Mother      Social History     Tobacco Use    Smoking status: Former Smoker     Types: Cigarettes     Start date: 6/27/2009    Smokeless tobacco: Never Used   Substance Use Topics    Alcohol use: Yes     Comment: occacionally    Drug use: No     Review of Systems   Constitutional: Negative for fever.   HENT: Negative for sore throat.    Respiratory: Negative for shortness of breath.    Cardiovascular: Negative for chest pain.   Gastrointestinal: Positive for abdominal pain. Negative for nausea.   Genitourinary: Negative for dysuria.   Musculoskeletal: Negative for back pain.   Skin: Negative for rash.   Neurological: Negative for weakness.   Hematological: Does not bruise/bleed easily.       Physical Exam     Initial Vitals [05/07/22 0349]   BP Pulse Resp Temp SpO2   (!) 154/80 80 17 98.8 °F (37.1 °C) 100 %      MAP       --         Physical Exam    Constitutional: Vital signs are normal. He appears well-developed and well-nourished.   HENT:   Head: Normocephalic and atraumatic.    Right Ear: Hearing normal.   Left Ear: Hearing normal.   Eyes: Conjunctivae are normal.   Cardiovascular: Normal rate and regular rhythm.   Abdominal: Abdomen is soft. Bowel sounds are normal.   Soft, benign, non acute   Musculoskeletal:         General: Normal range of motion.     Neurological: He is alert and oriented to person, place, and time.   Skin: Skin is warm and intact.   Psychiatric: He has a normal mood and affect. His speech is normal and behavior is normal. Cognition and memory are normal.         ED Course   Procedures  Labs Reviewed - No data to display       Imaging Results    None          Medications   aluminum-magnesium hydroxide-simethicone 200-200-20 mg/5 mL suspension 30 mL (30 mLs Oral Given 5/7/22 0400)     And   LIDOcaine HCl 2% oral solution 10 mL (10 mLs Oral Given 5/7/22 0400)   ondansetron disintegrating tablet 4 mg (4 mg Oral Given 5/7/22 0400)     Medical Decision Making:   History:   Old Medical Records: I decided to obtain old medical records.  Old Records Summarized: records from clinic visits.  Initial Assessment:   35 yo M to the ED with abdominal pain  Differential Diagnosis:   Gastritis, gastroenteritis, PUD,   ED Management:  Plan  Hx and exam consistent with gastroenteritis. Viral  NO CP, No SOB,   Exam is  Benign, abd is soft, and non acute.   No concern for cardiac etiology.   Treatment in the ED with GI cocktail and zofran,   DC with pepcid and levsin,   Return information given. Pt stable for DC.                       Clinical Impression:   Final diagnoses:  [K52.9] Gastroenteritis (Primary)          ED Disposition Condition    Discharge Stable        ED Prescriptions     Medication Sig Dispense Start Date End Date Auth. Provider    ondansetron (ZOFRAN) 4 MG tablet Take 1 tablet (4 mg total) by mouth every 6 (six) hours as needed for Nausea. 20 tablet 5/7/2022 6/3/2022 Paul Mcdonald PA-C    hyoscyamine (ANASPAZ,LEVSIN) 0.125 mg Tab Take 1 tablet (125 mcg total) by  mouth 3 (three) times daily. 20 tablet 5/7/2022 6/6/2022 Paul Mcdonald PA-C    famotidine (PEPCID) 20 MG tablet Take 1 tablet (20 mg total) by mouth 2 (two) times daily. 14 tablet 5/7/2022 5/7/2023 Paul Mcdonald PA-C        Follow-up Information    None          Paul Mcdonald PA-C  05/07/22 0413

## 2022-05-07 NOTE — Clinical Note
"Rodríguez Bhaktasarah Graves was seen and treated in our emergency department on 5/7/2022.  He may return to work on 06/06/2022.       If you have any questions or concerns, please don't hesitate to call.      Bridget SLATER    "

## 2022-05-07 NOTE — Clinical Note
"Rodríguez Bhaktasarah Graves was seen and treated in our emergency department on 5/7/2022.  He may return to work on 05/09/2022.       If you have any questions or concerns, please don't hesitate to call.      Bridget SLATER    "

## 2022-05-07 NOTE — DISCHARGE INSTRUCTIONS
Use Zofran as needed for nausea  Use  Levsin as needed for abdominal spasm  Follow-up with your primary care doctor  Drink plenty of fluids  Return to the ED as needed    Thank you for coming to our Emergency Department today. It is important to remember that some problems are difficult to diagnose and may not be found during your Emergency Department visit. Be sure to follow up with your primary care doctor and review all labs/imaging/tests that were performed during this visit with them. Some labs/tests may be outside of the normal range and require non-emergent follow-up and further investigation to help diagnose/exclude/prevent complications or other medical conditions.    If you do not have a primary care doctor, you may contact the one listed on your discharge paperwork or you may also call the [facility] Appointment Desk at [phone] to schedule an appointment and establish care with one. It is important to your health that you have a primary care doctor.    Please take all medications as directed. All medications may potentially have side-effects and it is impossible to predict which medications may give you side-effects or what side-effects (if any) they will give you.. If you feel that you are having a negative effect or side-effect of any medication you should immediately stop taking them and seek medical attention. If you feel that you are having a life-threatening reaction call [phone].    Return to the ER with any questions/concerns, new/concerning symptoms, worsening or failure to improve.     Do not drive, swim, climb to height, take a bath or make any important decisions for 24 hours if you have received any pain medications, sedatives or mood altering drugs during your ER visit.

## 2022-06-10 DIAGNOSIS — I10 UNCONTROLLED HYPERTENSION: ICD-10-CM

## 2022-06-10 RX ORDER — HYDROCHLOROTHIAZIDE 12.5 MG/1
TABLET ORAL
Qty: 90 TABLET | Refills: 1 | Status: SHIPPED | OUTPATIENT
Start: 2022-06-10 | End: 2022-12-13 | Stop reason: SDUPTHER

## 2022-06-10 NOTE — TELEPHONE ENCOUNTER
No new care gaps identified.  [facility] Embedded Care Gaps. Reference number: [id number]. 6/10/2022   3:18:03 AM CDT

## 2022-06-10 NOTE — TELEPHONE ENCOUNTER
Refill Routing Note   Medication(s) are not appropriate for processing by [facility] for the following reason(s):      - Required vitals are abnormal  - Patient has been seen in the ED/Hospital since the last PCP visit    [facility] action(s):  Defer          Medication reconciliation completed: No     Appointments  past 12m or future 3m with PCP    Date Provider   Last Visit   12/16/2021 Johnny Justin MD   Next Visit   Visit date not found Johnny Justin MD   ED visits in past 90 days: 1        Note composed:7:37 AM 06/10/2022

## 2023-01-04 DIAGNOSIS — I10 ESSENTIAL HYPERTENSION: ICD-10-CM

## 2023-01-31 NOTE — PROGRESS NOTES
Health Maintenance Due   Topic     COVID-19 Vaccine (3 - Booster for Pfizer series) Not offered at this office    Influenza Vaccine (1) Pt decline

## 2023-02-01 ENCOUNTER — LAB VISIT (OUTPATIENT)
Dept: LAB | Facility: HOSPITAL | Age: 38
End: 2023-02-01
Attending: FAMILY MEDICINE
Payer: COMMERCIAL

## 2023-02-01 ENCOUNTER — OFFICE VISIT (OUTPATIENT)
Dept: FAMILY MEDICINE | Facility: CLINIC | Age: 38
End: 2023-02-01
Payer: COMMERCIAL

## 2023-02-01 VITALS
TEMPERATURE: 98 F | HEART RATE: 62 BPM | DIASTOLIC BLOOD PRESSURE: 80 MMHG | OXYGEN SATURATION: 96 % | RESPIRATION RATE: 18 BRPM | WEIGHT: 251.31 LBS | BODY MASS INDEX: 38.09 KG/M2 | HEIGHT: 68 IN | SYSTOLIC BLOOD PRESSURE: 126 MMHG

## 2023-02-01 DIAGNOSIS — R17 TOTAL BILIRUBIN, ELEVATED: ICD-10-CM

## 2023-02-01 DIAGNOSIS — Z00.00 ANNUAL PHYSICAL EXAM: Primary | ICD-10-CM

## 2023-02-01 DIAGNOSIS — Z00.00 ANNUAL PHYSICAL EXAM: ICD-10-CM

## 2023-02-01 DIAGNOSIS — I10 PRIMARY HYPERTENSION: ICD-10-CM

## 2023-02-01 LAB
ALBUMIN SERPL BCP-MCNC: 4 G/DL (ref 3.5–5.2)
ALP SERPL-CCNC: 85 U/L (ref 55–135)
ALT SERPL W/O P-5'-P-CCNC: 24 U/L (ref 10–44)
ANION GAP SERPL CALC-SCNC: 9 MMOL/L (ref 8–16)
AST SERPL-CCNC: 21 U/L (ref 10–40)
BASOPHILS # BLD AUTO: 0.01 K/UL (ref 0–0.2)
BASOPHILS NFR BLD: 0.2 % (ref 0–1.9)
BILIRUB DIRECT SERPL-MCNC: 0.3 MG/DL (ref 0.1–0.3)
BILIRUB SERPL-MCNC: 1 MG/DL (ref 0.1–1)
BUN SERPL-MCNC: 16 MG/DL (ref 6–20)
CALCIUM SERPL-MCNC: 9.4 MG/DL (ref 8.7–10.5)
CHLORIDE SERPL-SCNC: 105 MMOL/L (ref 95–110)
CHOLEST SERPL-MCNC: 209 MG/DL (ref 120–199)
CHOLEST/HDLC SERPL: 3 {RATIO} (ref 2–5)
CO2 SERPL-SCNC: 27 MMOL/L (ref 23–29)
CREAT SERPL-MCNC: 1.2 MG/DL (ref 0.5–1.4)
DIFFERENTIAL METHOD: NORMAL
EOSINOPHIL # BLD AUTO: 0.1 K/UL (ref 0–0.5)
EOSINOPHIL NFR BLD: 0.9 % (ref 0–8)
ERYTHROCYTE [DISTWIDTH] IN BLOOD BY AUTOMATED COUNT: 12.2 % (ref 11.5–14.5)
EST. GFR  (NO RACE VARIABLE): >60 ML/MIN/1.73 M^2
ESTIMATED AVG GLUCOSE: 103 MG/DL (ref 68–131)
GLUCOSE SERPL-MCNC: 99 MG/DL (ref 70–110)
HBA1C MFR BLD: 5.2 % (ref 4–5.6)
HCT VFR BLD AUTO: 44.9 % (ref 40–54)
HDLC SERPL-MCNC: 70 MG/DL (ref 40–75)
HDLC SERPL: 33.5 % (ref 20–50)
HGB BLD-MCNC: 14.6 G/DL (ref 14–18)
IMM GRANULOCYTES # BLD AUTO: 0.01 K/UL (ref 0–0.04)
IMM GRANULOCYTES NFR BLD AUTO: 0.2 % (ref 0–0.5)
LDLC SERPL CALC-MCNC: 130.4 MG/DL (ref 63–159)
LYMPHOCYTES # BLD AUTO: 2.1 K/UL (ref 1–4.8)
LYMPHOCYTES NFR BLD: 38.6 % (ref 18–48)
MCH RBC QN AUTO: 30.5 PG (ref 27–31)
MCHC RBC AUTO-ENTMCNC: 32.5 G/DL (ref 32–36)
MCV RBC AUTO: 94 FL (ref 82–98)
MONOCYTES # BLD AUTO: 0.4 K/UL (ref 0.3–1)
MONOCYTES NFR BLD: 6.4 % (ref 4–15)
NEUTROPHILS # BLD AUTO: 2.9 K/UL (ref 1.8–7.7)
NEUTROPHILS NFR BLD: 53.7 % (ref 38–73)
NONHDLC SERPL-MCNC: 139 MG/DL
NRBC BLD-RTO: 0 /100 WBC
PLATELET # BLD AUTO: 281 K/UL (ref 150–450)
PMV BLD AUTO: 10.7 FL (ref 9.2–12.9)
POTASSIUM SERPL-SCNC: 4.8 MMOL/L (ref 3.5–5.1)
PROT SERPL-MCNC: 7.2 G/DL (ref 6–8.4)
RBC # BLD AUTO: 4.79 M/UL (ref 4.6–6.2)
SODIUM SERPL-SCNC: 141 MMOL/L (ref 136–145)
TRIGL SERPL-MCNC: 43 MG/DL (ref 30–150)
TSH SERPL DL<=0.005 MIU/L-ACNC: 0.82 UIU/ML (ref 0.4–4)
WBC # BLD AUTO: 5.44 K/UL (ref 3.9–12.7)

## 2023-02-01 PROCEDURE — 84443 ASSAY THYROID STIM HORMONE: CPT | Performed by: FAMILY MEDICINE

## 2023-02-01 PROCEDURE — 80048 BASIC METABOLIC PNL TOTAL CA: CPT | Performed by: FAMILY MEDICINE

## 2023-02-01 PROCEDURE — 3079F PR MOST RECENT DIASTOLIC BLOOD PRESSURE 80-89 MM HG: ICD-10-PCS | Mod: CPTII,S$GLB,, | Performed by: FAMILY MEDICINE

## 2023-02-01 PROCEDURE — 3008F BODY MASS INDEX DOCD: CPT | Mod: CPTII,S$GLB,, | Performed by: FAMILY MEDICINE

## 2023-02-01 PROCEDURE — 36415 COLL VENOUS BLD VENIPUNCTURE: CPT | Mod: PO | Performed by: FAMILY MEDICINE

## 2023-02-01 PROCEDURE — 3074F SYST BP LT 130 MM HG: CPT | Mod: CPTII,S$GLB,, | Performed by: FAMILY MEDICINE

## 2023-02-01 PROCEDURE — 85025 COMPLETE CBC W/AUTO DIFF WBC: CPT | Performed by: FAMILY MEDICINE

## 2023-02-01 PROCEDURE — 1159F PR MEDICATION LIST DOCUMENTED IN MEDICAL RECORD: ICD-10-PCS | Mod: CPTII,S$GLB,, | Performed by: FAMILY MEDICINE

## 2023-02-01 PROCEDURE — 1159F MED LIST DOCD IN RCRD: CPT | Mod: CPTII,S$GLB,, | Performed by: FAMILY MEDICINE

## 2023-02-01 PROCEDURE — 80061 LIPID PANEL: CPT | Performed by: FAMILY MEDICINE

## 2023-02-01 PROCEDURE — 3008F PR BODY MASS INDEX (BMI) DOCUMENTED: ICD-10-PCS | Mod: CPTII,S$GLB,, | Performed by: FAMILY MEDICINE

## 2023-02-01 PROCEDURE — 99999 PR PBB SHADOW E&M-EST. PATIENT-LVL III: ICD-10-PCS | Mod: PBBFAC,,, | Performed by: FAMILY MEDICINE

## 2023-02-01 PROCEDURE — 99395 PREV VISIT EST AGE 18-39: CPT | Mod: S$GLB,,, | Performed by: FAMILY MEDICINE

## 2023-02-01 PROCEDURE — 3079F DIAST BP 80-89 MM HG: CPT | Mod: CPTII,S$GLB,, | Performed by: FAMILY MEDICINE

## 2023-02-01 PROCEDURE — 83036 HEMOGLOBIN GLYCOSYLATED A1C: CPT | Performed by: FAMILY MEDICINE

## 2023-02-01 PROCEDURE — 3074F PR MOST RECENT SYSTOLIC BLOOD PRESSURE < 130 MM HG: ICD-10-PCS | Mod: CPTII,S$GLB,, | Performed by: FAMILY MEDICINE

## 2023-02-01 PROCEDURE — 80076 HEPATIC FUNCTION PANEL: CPT | Performed by: FAMILY MEDICINE

## 2023-02-01 PROCEDURE — 99999 PR PBB SHADOW E&M-EST. PATIENT-LVL III: CPT | Mod: PBBFAC,,, | Performed by: FAMILY MEDICINE

## 2023-02-01 PROCEDURE — 99395 PR PREVENTIVE VISIT,EST,18-39: ICD-10-PCS | Mod: S$GLB,,, | Performed by: FAMILY MEDICINE

## 2023-02-01 RX ORDER — HYDROCHLOROTHIAZIDE 12.5 MG/1
12.5 TABLET ORAL DAILY
Qty: 90 TABLET | Refills: 3 | Status: SHIPPED | OUTPATIENT
Start: 2023-02-01 | End: 2023-03-01

## 2023-02-01 RX ORDER — COVID-19 MOLECULAR TEST ASSAY
KIT MISCELLANEOUS
COMMUNITY
Start: 2022-12-15

## 2023-02-01 NOTE — PROGRESS NOTES
Subjective:       Patient ID: Rodríguez Graves is a 37 y.o. male.    Chief Complaint: Annual Exam      37 y.o male presents for annual physical exam. He has no complaints at this time and feels generally well. No change in diet, exercise, sleep or recent stressors since last visit.        Review of Systems   Constitutional: Negative.    HENT: Negative.     Respiratory: Negative.     Cardiovascular: Negative.    Gastrointestinal: Negative.    Genitourinary: Negative.    Musculoskeletal: Negative.    Neurological: Negative.    Psychiatric/Behavioral: Negative.          Past Medical History:   Diagnosis Date    Anxiety     Depression     Essential hypertension 11/11/2021    Infertility male      Past Surgical History:   Procedure Laterality Date    Urecthrotomy      WISDOM TOOTH EXTRACTION      2 extracted      Family History   Problem Relation Age of Onset    Hypertension Mother      Social History     Socioeconomic History    Marital status:    Tobacco Use    Smoking status: Former     Types: Cigarettes     Start date: 6/27/2009    Smokeless tobacco: Never   Substance and Sexual Activity    Alcohol use: Yes     Comment: occacionally    Drug use: No    Sexual activity: Yes     Partners: Female     Birth control/protection: None     Comment: 11/6/17  with same partner 10 years        Current Outpatient Medications:     amLODIPine (NORVASC) 5 MG tablet, Take 2 tablets (10 mg total) by mouth once daily., Disp: 30 tablet, Rfl: 2    BINAXNOW COVID-19 AG SELF TEST Kit, use as directed, Disp: , Rfl:     famotidine (PEPCID) 20 MG tablet, Take 1 tablet (20 mg total) by mouth 2 (two) times daily., Disp: 14 tablet, Rfl: 0    hydroCHLOROthiazide (HYDRODIURIL) 12.5 MG Tab, Take 1 tablet (12.5 mg total) by mouth once daily., Disp: 90 tablet, Rfl: 3    hyoscyamine (ANASPAZ,LEVSIN) 0.125 mg Tab, Take 1 tablet (125 mcg total) by mouth 3 (three) times daily., Disp: 20 tablet, Rfl: 0   Objective:      Vitals:    02/01/23 0721  "  BP: 126/80   BP Location: Right arm   Patient Position: Sitting   BP Method: Small (Manual)   Pulse: 62   Resp: 18   Temp: 97.6 °F (36.4 °C)   TempSrc: Oral   SpO2: 96%   Weight: 114 kg (251 lb 5.2 oz)   Height: 5' 8" (1.727 m)       Physical Exam  Constitutional:       General: He is not in acute distress.  HENT:      Head: Normocephalic and atraumatic.   Eyes:      Conjunctiva/sclera: Conjunctivae normal.   Cardiovascular:      Rate and Rhythm: Normal rate and regular rhythm.      Heart sounds: Normal heart sounds. No murmur heard.    No friction rub. No gallop.   Pulmonary:      Effort: Pulmonary effort is normal.      Breath sounds: Normal breath sounds. No wheezing or rales.   Musculoskeletal:      Cervical back: Neck supple.   Skin:     General: Skin is warm and dry.   Neurological:      Mental Status: He is alert and oriented to person, place, and time.   Psychiatric:         Behavior: Behavior normal.         Thought Content: Thought content normal.         Judgment: Judgment normal.          Assessment:       1. Annual physical exam    2. Primary hypertension    3. Total bilirubin, elevated        Plan:   Annual physical exam  -     CBC Auto Differential; Future; Expected date: 02/01/2023  -     Cancel: Comprehensive Metabolic Panel; Future; Expected date: 02/01/2023  -     Hemoglobin A1C; Future; Expected date: 02/01/2023  -     TSH; Future; Expected date: 02/01/2023  -     Lipid Panel; Future; Expected date: 02/01/2023  -     HEPATIC FUNCTION PANEL; Future; Expected date: 02/01/2023  -     BASIC METABOLIC PANEL; Future; Expected date: 02/01/2023    Primary hypertension  -     hydroCHLOROthiazide (HYDRODIURIL) 12.5 MG Tab; Take 1 tablet (12.5 mg total) by mouth once daily.  Dispense: 90 tablet; Refill: 3    Total bilirubin, elevated  -     HEPATIC FUNCTION PANEL; Future; Expected date: 02/01/2023      F/u labs. Cont meds.            Future Appointments   Date Time Provider Department Center   2/13/2023  " 1:00 PM Lilliana Mireles MD [facility]       Patient note was created using [employer].  Any errors in syntax or even information may not have been identified and edited on initial review prior to signing this note.

## 2023-02-03 DIAGNOSIS — M79.672 PAIN IN BOTH FEET: Primary | ICD-10-CM

## 2023-02-03 DIAGNOSIS — M79.671 PAIN IN BOTH FEET: Primary | ICD-10-CM

## 2023-02-20 ENCOUNTER — HOSPITAL ENCOUNTER (EMERGENCY)
Facility: HOSPITAL | Age: 38
Discharge: HOME | End: 2023-02-20
Attending: EMERGENCY MEDICINE
Payer: COMMERCIAL

## 2023-02-20 VITALS
RESPIRATION RATE: 18 BRPM | SYSTOLIC BLOOD PRESSURE: 154 MMHG | DIASTOLIC BLOOD PRESSURE: 88 MMHG | BODY MASS INDEX: 37.89 KG/M2 | TEMPERATURE: 98 F | HEIGHT: 68 IN | HEART RATE: 56 BPM | WEIGHT: 250 LBS | OXYGEN SATURATION: 99 %

## 2023-02-20 DIAGNOSIS — R79.89 ELEVATED PROLACTIN LEVEL: Primary | ICD-10-CM

## 2023-02-20 DIAGNOSIS — R55 NEAR SYNCOPE: ICD-10-CM

## 2023-02-20 DIAGNOSIS — R42 LIGHT HEADEDNESS: ICD-10-CM

## 2023-02-20 LAB
ALBUMIN SERPL BCP-MCNC: 4.3 G/DL (ref 3.5–5.2)
ALP SERPL-CCNC: 88 U/L (ref 55–135)
ALT SERPL W/O P-5'-P-CCNC: 23 U/L (ref 10–44)
AMPHET+METHAMPHET UR QL: NEGATIVE
ANION GAP SERPL CALC-SCNC: 7 MMOL/L (ref 8–16)
AST SERPL-CCNC: 25 U/L (ref 10–40)
BARBITURATES UR QL SCN>200 NG/ML: NEGATIVE
BASOPHILS # BLD AUTO: 0 K/UL (ref 0–0.2)
BASOPHILS NFR BLD: 0 % (ref 0–1.9)
BENZODIAZ UR QL SCN>200 NG/ML: NEGATIVE
BILIRUB SERPL-MCNC: 1.2 MG/DL (ref 0.1–1)
BILIRUB UR QL STRIP: NEGATIVE
BUN SERPL-MCNC: 13 MG/DL (ref 6–20)
BZE UR QL SCN: NEGATIVE
CALCIUM SERPL-MCNC: 9.1 MG/DL (ref 8.7–10.5)
CANNABINOIDS UR QL SCN: ABNORMAL
CHLORIDE SERPL-SCNC: 107 MMOL/L (ref 95–110)
CLARITY UR: CLEAR
CO2 SERPL-SCNC: 24 MMOL/L (ref 23–29)
COLOR UR: YELLOW
CORTIS SERPL-MCNC: 1.6 UG/DL
CREAT SERPL-MCNC: 1 MG/DL (ref 0.5–1.4)
CREAT UR-MCNC: 170.7 MG/DL (ref 23–375)
DIFFERENTIAL METHOD: NORMAL
EOSINOPHIL # BLD AUTO: 0 K/UL (ref 0–0.5)
EOSINOPHIL NFR BLD: 0.4 % (ref 0–8)
ERYTHROCYTE [DISTWIDTH] IN BLOOD BY AUTOMATED COUNT: 12.2 % (ref 11.5–14.5)
EST. GFR  (NO RACE VARIABLE): >60 ML/MIN/1.73 M^2
GLUCOSE SERPL-MCNC: 82 MG/DL (ref 70–110)
GLUCOSE UR QL STRIP: NEGATIVE
HCT VFR BLD AUTO: 42.2 % (ref 40–54)
HGB BLD-MCNC: 14.6 G/DL (ref 14–18)
HGB UR QL STRIP: NEGATIVE
IMM GRANULOCYTES # BLD AUTO: 0.02 K/UL (ref 0–0.04)
IMM GRANULOCYTES NFR BLD AUTO: 0.2 % (ref 0–0.5)
KETONES UR QL STRIP: NEGATIVE
LEUKOCYTE ESTERASE UR QL STRIP: NEGATIVE
LYMPHOCYTES # BLD AUTO: 3 K/UL (ref 1–4.8)
LYMPHOCYTES NFR BLD: 32.7 % (ref 18–48)
MCH RBC QN AUTO: 30.8 PG (ref 27–31)
MCHC RBC AUTO-ENTMCNC: 34.6 G/DL (ref 32–36)
MCV RBC AUTO: 89 FL (ref 82–98)
METHADONE UR QL SCN>300 NG/ML: NEGATIVE
MONOCYTES # BLD AUTO: 0.7 K/UL (ref 0.3–1)
MONOCYTES NFR BLD: 7.6 % (ref 4–15)
NEUTROPHILS # BLD AUTO: 5.4 K/UL (ref 1.8–7.7)
NEUTROPHILS NFR BLD: 59.1 % (ref 38–73)
NITRITE UR QL STRIP: NEGATIVE
NRBC BLD-RTO: 0 /100 WBC
OPIATES UR QL SCN: NEGATIVE
PCP UR QL SCN>25 NG/ML: NEGATIVE
PH UR STRIP: 6 [PH] (ref 5–8)
PLATELET # BLD AUTO: 274 K/UL (ref 150–450)
PMV BLD AUTO: 9.6 FL (ref 9.2–12.9)
POCT GLUCOSE: 86 MG/DL (ref 70–110)
POTASSIUM SERPL-SCNC: 4.2 MMOL/L (ref 3.5–5.1)
PROT SERPL-MCNC: 7.6 G/DL (ref 6–8.4)
PROT UR QL STRIP: NEGATIVE
RBC # BLD AUTO: 4.74 M/UL (ref 4.6–6.2)
SODIUM SERPL-SCNC: 138 MMOL/L (ref 136–145)
SP GR UR STRIP: 1.02 (ref 1–1.03)
TOXICOLOGY INFORMATION: ABNORMAL
TSH SERPL DL<=0.005 MIU/L-ACNC: 2.76 UIU/ML (ref 0.4–4)
URN SPEC COLLECT METH UR: NORMAL
UROBILINOGEN UR STRIP-ACNC: NEGATIVE EU/DL
WBC # BLD AUTO: 9.2 K/UL (ref 3.9–12.7)

## 2023-02-20 PROCEDURE — 82024 ASSAY OF ACTH: CPT | Performed by: PHYSICIAN ASSISTANT

## 2023-02-20 PROCEDURE — 81003 URINALYSIS AUTO W/O SCOPE: CPT | Mod: 59 | Performed by: NURSE PRACTITIONER

## 2023-02-20 PROCEDURE — 80307 DRUG TEST PRSMV CHEM ANLYZR: CPT | Performed by: NURSE PRACTITIONER

## 2023-02-20 PROCEDURE — 93010 EKG 12-LEAD: ICD-10-PCS | Mod: ,,, | Performed by: INTERNAL MEDICINE

## 2023-02-20 PROCEDURE — 93010 ELECTROCARDIOGRAM REPORT: CPT | Mod: ,,, | Performed by: INTERNAL MEDICINE

## 2023-02-20 PROCEDURE — 93005 ELECTROCARDIOGRAM TRACING: CPT

## 2023-02-20 PROCEDURE — 99285 EMERGENCY DEPT VISIT HI MDM: CPT | Mod: 25

## 2023-02-20 PROCEDURE — 85025 COMPLETE CBC W/AUTO DIFF WBC: CPT | Performed by: NURSE PRACTITIONER

## 2023-02-20 PROCEDURE — 84443 ASSAY THYROID STIM HORMONE: CPT | Performed by: PHYSICIAN ASSISTANT

## 2023-02-20 PROCEDURE — 80053 COMPREHEN METABOLIC PANEL: CPT | Performed by: NURSE PRACTITIONER

## 2023-02-20 PROCEDURE — 82962 GLUCOSE BLOOD TEST: CPT

## 2023-02-20 PROCEDURE — 82533 TOTAL CORTISOL: CPT | Performed by: PHYSICIAN ASSISTANT

## 2023-02-20 PROCEDURE — 84146 ASSAY OF PROLACTIN: CPT | Performed by: PHYSICIAN ASSISTANT

## 2023-02-20 NOTE — FIRST PROVIDER EVALUATION
Emergency Department TeleTriage Encounter Note      CHIEF COMPLAINT    Chief Complaint   Patient presents with    Dizziness     Pt c/o dizziness x 4-5 days. Pt states his symptoms have progressed to where he is stumbling around and losing his balance. Pt states he has PMH of HTN. Pt denies falling or losing consciousness.        VITAL SIGNS   Initial Vitals [02/20/23 1420]   BP Pulse Resp Temp SpO2   133/82 70 15 97.9 °F (36.6 °C) 99 %      MAP       --            ALLERGIES    Review of patient's allergies indicates:  No Known Allergies    PROVIDER TRIAGE NOTE  This is a teletriage evaluation of a 37 y.o. male presenting to the ED complaining of intermittent light-headedness for the past four days. Denies weakness, numbness/tingling, and visual disturbance.  Syptoms becoming more frequent. Denies n/v/d.  States that it happens with movement occasionally.     Possible BPPV.  Will obtain labs and EKG to check electrolytes, H&H, and cardiac rhythm.  No obvious focal neuro finding noted during teletriage.     Initial orders will be placed and care will be transferred to an alternate provider when patient is roomed for a full evaluation. Any additional orders and the final disposition will be determined by that provider.         ORDERS  Labs Reviewed   CBC W/ AUTO DIFFERENTIAL   COMPREHENSIVE METABOLIC PANEL   DRUG SCREEN PANEL, URINE EMERGENCY   URINALYSIS, REFLEX TO URINE CULTURE   POCT GLUCOSE   POCT GLUCOSE MONITORING CONTINUOUS       ED Orders (720h ago, onward)      Start Ordered     Status Ordering Provider    02/20/23 1502 02/20/23 1501  CBC auto differential  STAT         Ordered FABRICIO LANDIS N.    02/20/23 1502 02/20/23 1501  Comprehensive metabolic panel  STAT         Ordered FABRICIO LANDIS N.    02/20/23 1502 02/20/23 1501  EKG 12-lead  Once         Ordered FABRICIO LANDIS N.    02/20/23 1502 02/20/23 1501  POCT glucose  Once         Ordered FABRICIO LANDIS N.    02/20/23 1502  02/20/23 1501  Drug screen panel, emergency  STAT         Ordered FABRICIO LANDIS N.    02/20/23 1502 02/20/23 1501  Urinalysis, Reflex to Urine Culture Urine, Clean Catch  STAT         Ordered FABRICIO LANDIS N.    02/20/23 1502 02/20/23 1501  Orthostatic vital signs  Once         Ordered OMEROTTELCOMFORTTTLUZMARIA FABRICIO N.    02/20/23 1423 02/20/23 1423  POCT glucose  Once         Final result EMERGENCY, DEPT PHYSICIAN    02/20/23 1423 02/20/23 1422  EKG 12-lead  Once         Completed by DAVIDA LINDER on 2/20/2023 at  2:31 PM GORDON MAHMOOD              Virtual Visit Note: The provider triage portion of this emergency department evaluation and documentation was performed via [employer], a HIPAA-compliant telemedicine application, in concert with a tele-presenter in the room. A face to face patient evaluation with one of my colleagues will occur once the patient is placed in an emergency department room.      DISCLAIMER: This note was prepared with [employer] voice recognition transcription software. Garbled syntax, mangled pronouns, and other bizarre constructions may be attributed to that software system.

## 2023-02-20 NOTE — ED PROVIDER NOTES
Encounter Date: 2/20/2023       History     Chief Complaint   Patient presents with    Dizziness     Pt c/o dizziness x 4-5 days. Pt states his symptoms have progressed to where he is stumbling around and losing his balance. Pt states he has PMH of HTN. Pt denies falling or losing consciousness.      37 year old male with PMHx HTN presents to the ED complaining of episodes of lightheadedness and loss of balance x 4-5 days. Patient reports initially this was occurring twice daily but now occurs 5x per day. Describes symptoms as feeling as if he is going to pass out as opposed to room spinning. He states symptoms occur both while standing and sitting. Patient denies falling or full syncope during these episodes. No history of diabetes. Patient states he thought it could be low blood sugar and started eating more throughout the day which did not resolve his symptoms. Patient states he sometimes experiences an episode shortly after taking his BP medication. No recent changes in his medications. The patient mentions in 2021 he was seen by endocrinology for loss of libido and found to have elevated prolactin and small pituitary adenoma. He was started on trial of cabergoline but did not follow-up afterwards. He denies vision changes or headaches. No galactorrhea.     Chart Review: MRI Pituitary (3/2021) results show pituitary gland is normal in height, contour, and signal.  After contrast administration there is subtle diminished enhancement of the left gland that may represent a small adenoma.  Lesion measures roughly 4 x 4 mm.  No mass effect or extension out of the pituitary fossa.    Review of patient's allergies indicates:  No Known Allergies  Past Medical History:   Diagnosis Date    Anxiety     Depression     Essential hypertension 11/11/2021    Infertility male      Past Surgical History:   Procedure Laterality Date    Urecthrotomy      WISDOM TOOTH EXTRACTION      2 extracted      Family History   Problem  Relation Age of Onset    Hypertension Mother      Social History     Tobacco Use    Smoking status: Former     Types: Cigarettes     Start date: 6/27/2009    Smokeless tobacco: Never   Substance Use Topics    Alcohol use: Yes     Comment: occacionally    Drug use: No     Review of Systems   Constitutional:  Negative for chills and fever.   HENT:  Negative for congestion, ear pain, rhinorrhea and sore throat.    Eyes:  Negative for redness and visual disturbance.   Respiratory:  Negative for shortness of breath and stridor.    Cardiovascular:  Negative for chest pain.   Gastrointestinal:  Negative for abdominal pain, constipation, diarrhea, nausea and vomiting.   Genitourinary:  Negative for dysuria, frequency, hematuria and urgency.   Musculoskeletal:  Negative for back pain and neck pain.   Skin:  Negative for rash.   Neurological:  Positive for light-headedness. Negative for dizziness, speech difficulty, weakness, numbness and headaches.   Hematological:  Does not bruise/bleed easily.   Psychiatric/Behavioral:  Negative for confusion.      Physical Exam     Initial Vitals [02/20/23 1420]   BP Pulse Resp Temp SpO2   133/82 70 15 97.9 °F (36.6 °C) 99 %      MAP       --         Physical Exam    Nursing note and vitals reviewed.  Constitutional: He appears well-developed and well-nourished.  Non-toxic appearance. He does not have a sickly appearance. No distress.   HENT:   Head: Normocephalic.   Right Ear: External ear normal.   Left Ear: External ear normal.   Nose: Nose normal.   Mouth/Throat: Oropharynx is clear and moist.   Eyes: Conjunctivae and EOM are normal.   Neck: Neck supple. No tracheal deviation present.   Normal range of motion.  Cardiovascular:  Normal rate and regular rhythm.     Exam reveals no gallop and no friction rub.       No murmur heard.  Pulmonary/Chest: Breath sounds normal. No tachypnea and no bradypnea. No respiratory distress. He has no wheezes. He has no rhonchi. He has no rales.    Abdominal: Abdomen is soft. Bowel sounds are normal. He exhibits no distension. There is no abdominal tenderness. There is no rebound and no guarding.   Musculoskeletal:      Cervical back: Normal range of motion and neck supple.     Lymphadenopathy:     He has no cervical adenopathy.   Neurological: He is alert and oriented to person, place, and time. No cranial nerve deficit or sensory deficit. He displays a negative Romberg sign. GCS eye subscore is 4. GCS verbal subscore is 5. GCS motor subscore is 6.   No pronator drift. Normal finger to nose.   Skin: Skin is warm and dry. No rash noted.   Psychiatric: He has a normal mood and affect. His behavior is normal. Judgment and thought content normal.       ED Course   Procedures  Labs Reviewed   COMPREHENSIVE METABOLIC PANEL - Abnormal; Notable for the following components:       Result Value    Total Bilirubin 1.2 (*)     Anion Gap 7 (*)     All other components within normal limits   DRUG SCREEN PANEL, URINE EMERGENCY - Abnormal; Notable for the following components:    THC Presumptive Positive (*)     All other components within normal limits    Narrative:     Specimen Source->Urine   CBC W/ AUTO DIFFERENTIAL   URINALYSIS, REFLEX TO URINE CULTURE    Narrative:     Specimen Source->Urine   TSH   ACTH   CORTISOL, RANDOM   PROLACTIN   POCT GLUCOSE   POCT GLUCOSE MONITORING CONTINUOUS        ECG Results              EKG 12-lead (Final result)  Result time 02/20/23 15:55:19      Final result by Interface, Lab In [facility] (02/20/23 15:55:19)                   Narrative:    Test Reason : R42,    Vent. Rate : 067 BPM     Atrial Rate : 067 BPM     P-R Int : 172 ms          QRS Dur : 088 ms      QT Int : 382 ms       P-R-T Axes : 035 020 011 degrees     QTc Int : 403 ms    Normal sinus rhythm  Normal ECG  When compared with ECG of 11-NOV-2021 15:27,  No significant change was found  Confirmed by Phil Bejarano MD ([id number]) on 2/20/2023 3:55:09 PM    Referred By: [email or web address]    SELF           Confirmed By:Phil Bejarano MD                                  Imaging Results              CT Head Without Contrast (Final result)  Result time 02/20/23 17:48:10      Final result by Kate Alicia MD (02/20/23 17:48:10)                   Impression:      No acute intracranial abnormality detected.  As above described.      Electronically signed by: Kate Alicia  Date:    02/20/2023  Time:    17:48               Narrative:    EXAMINATION:  CT OF THE HEAD WITHOUT    CLINICAL HISTORY:  Dizziness, persistent/recurrent, cardiac or vascular cause suspected;    TECHNIQUE:  5 mm unenhanced axial images were obtained from the skull base to the vertex.    COMPARISON:  MRI 03/17/2021    FINDINGS:  The ventricles, basal cisterns, and cortical sulci are within normal limits for patient's stated age. There is no acute intracranial hemorrhage, territorial infarct or mass effect, or midline shift. The visualized paranasal sinuses and mastoid air cells are clear.  On MRI of the pituitary gland from 03/17/2021, a small pituitary adenoma was suspected to the left of midline.  There is remote fracture of the left lamina papyracea.                                       Medications - No data to display  Medical Decision Making:   Clinical Tests:   Lab Tests: Ordered and Reviewed  Radiological Study: Ordered and Reviewed  Medical Tests: Ordered and Reviewed  ED Management:  37 year old male with PMHx HTN presents with episodes of lightheadedness and loss of balance. Patient has history of pituitary adenoma seen on MRI in 2021. CT Head today shows no acute abnormality. Lab work pending for evaluation of pituitary. Blood sugar within normal limits. EKG without sign of arrhythmia. Physical exam unremarkable; no signs of neurological deficit. Normal orthostatic vital signs. Will discharge with follow-up to Neurology. Return precautions given for new or worsening symptoms. Patient also advised to follow-up with his  PCP. May be dehydration                        Clinical Impression:   Final diagnoses:  [R42] Light headedness  [R79.89] Elevated prolactin level (Primary)  [R55] Near syncope        ED Disposition Condition    Discharge Stable          ED Prescriptions    None       Follow-up Information       Follow up With Specialties Details Why Contact Info    Johnny Justin MD Family Medicine Schedule an appointment as soon as possible for a visit in 2 days for follow up [address] [place] [address]  [phone]      Km Reza MD Neurology Schedule an appointment as soon as possible for a visit in 2 days for follow up with Neurology [address] [facility] [address]  [address]  [facility] [address]  [phone]      [facility] Emergency Dept Emergency Medicine Go to  As needed, If symptoms worsen [address] [facility] [address] [phone]             Mihaela Beard PA-C  02/21/23 1112

## 2023-02-20 NOTE — Clinical Note
"Rodríguez"Gigi Graves was seen and treated in our emergency department on 2/20/2023.  He may return to work on 02/22/2023.       If you have any questions or concerns, please don't hesitate to call.      Mihaela Beard PA-C"

## 2023-02-20 NOTE — ED TRIAGE NOTES
Rodríguez Graves, a 37 y.o. male presents to the ED w/ complaint of dizziness x 5 days accompanied by increased loss of balance. Pt denies any LOC or head trauma. Pt is AAOX4.     Triage note:  Chief Complaint   Patient presents with    Dizziness     Pt c/o dizziness x 4-5 days. Pt states his symptoms have progressed to where he is stumbling around and losing his balance. Pt states he has PMH of HTN. Pt denies falling or losing consciousness.      Review of patient's allergies indicates:  No Known Allergies  Past Medical History:   Diagnosis Date    Anxiety     Depression     Essential hypertension 11/11/2021    Infertility male

## 2023-02-21 LAB — PROLACTIN SERPL IA-MCNC: 24.4 NG/ML (ref 3.5–19.4)

## 2023-02-21 NOTE — DISCHARGE INSTRUCTIONS

## 2023-02-23 ENCOUNTER — TELEPHONE (OUTPATIENT)
Dept: ENDOCRINOLOGY | Facility: CLINIC | Age: 38
End: 2023-02-23
Payer: COMMERCIAL

## 2023-02-24 LAB — ACTH PLAS-MCNC: 10 PG/ML (ref 0–46)

## 2023-02-26 NOTE — PROGRESS NOTES
PITUITARY [facility] ENDOCRINOLOGY FOLLOW UP  02/27/2023       Subjective:      CC: elevated prolactin and FSH    HPI:   Rodríguez Graves is a 37 y.o. male with hx of depression here for follow-up of elevated prolactin and FSH with normal IGF-1, low libido but normal testosterone.      Last seen by Dr Armstrong on 4/13/2021. Initially presented for infertility. At that time, high FSH, normal LH, and normal testosterone with low volume testes (9cc) - suspicion was gonadal etiology. Pituitary MRI in 3/2021 with possible microadenoma 4x4 mm. Had semen analysis at [facility], but did not have results at the time of his last visit with us. Trial of cabergoline was recommended with repeat PRL 1mo later. PRL was not repeated until 2/2023.    Interval hx:  He states that he started cabergoline - took only the first bottle x1mo - never refilled it or followed up. Did not feel any different while on the medication. Denies SE.    Lately he has had dizzy spells - went to the ED recently for this. No LOC or falls but feels like he is going to pass out.  Started 1wk ago - was 1-2x/day, now happening 1-2x/hour. Happens when he is sitting or walking - not orthostatic. Feels balance is off.     Had R vitrectomy in summer of 2022 -- has R sided HA around that eye since then.  Has noticed increased blurriness when reading far away. Wears glasses but does not wear them last several years - needs updated Rx -- going back to eye  3/8/2023.        Imaging:  MRI pituitary 3/17/21  Pituitary gland is normal in height, contour, and signal.  After contrast administration there is subtle diminished enhancement of the left gland that may represent a small adenoma.  Lesion measures roughly 4 x 4 mm.  No mass effect or extension out of the pituitary fossa.    He had a semen analysis at the fertility clinic at [facility] ([facility]) but has not received the results from the clinic yet.    He denies galactorrhea, no breast tenderness    No  HA or vision change.      Reports low libido for a few years also with occasional difficulty w/ erections, premature ejaculation.  Has decreased hair growth on body since teens, no facial hair until 18. He feels that testicles are small (around 8cc).  Was prescribed cialis which didn't help. Tried viagra - hit or miss, has not taken it in years (never refilled Rx).    He denies hx of testicular trauma, liver/renal dysfunction, radiation, mumps, chronic steroid use.        Low cortisol with normal ACTH on recent labs collected at 6pm however. He denies recent steroids. Aside from recent dizzy spells which is non-orthostatic, he denies symptoms of AI including fatigue, dizziness with standing, nausea/vomiting, weight loss, dehydration, abdominal pain, diarrhea.    Lab Results   Component Value Date    CORTISOL 1.60 02/20/2023    ACTH 10 02/20/2023    PROLACTIN 24.4 (H) 02/20/2023    FSH 17.40 (H) 03/11/2021    LABLH 8.8 03/11/2021    TOTALTESTOST 633 03/04/2021    TSH 2.764 02/20/2023        Ref. Range 10/7/2019 13:32 8/12/2020 08:35 3/4/2021 10:15   Albumin Latest Ref Range: 3.6 - 5.1 g/dL  4.1    Estradiol Latest Ref Range: 11 - 44 pg/mL 36  23   FSH Latest Ref Range: 0.95 - 11.95 mIU/mL 18.90 (H)  17.50 (H)   LH Latest Ref Range: 0.6 - 12.1 mIU/mL 9.1  9.7   Prolactin Latest Ref Range: 3.5 - 19.4 ng/mL 10.5  27.2 (H)   Sex Hormone Binding Globulin Latest Ref Range: 10 - 50 nmol/L  33    Testosterone Latest Ref Range: 250 - 1100 ng/dL  521    Testosterone, Bioavailable Latest Ref Range: 110.0 - 575.0 ng/dL  137.2    Testosterone, Free Latest Ref Range: 46.0 - 224.0 pg/mL  72.9    Testosterone, Total Latest Ref Range: 304 - 1227 ng/dL 688  633     Lab Results   Component Value Date    PROLACTIN 24.4 (H) 02/20/2023    PROLACTIN 21.7 (H) 03/11/2021    PROLACTIN 27.2 (H) 03/04/2021    PROLACTIN 10.5 10/07/2019        The patient is not currently using any medication known to cause hyperprolactinemia such as:  antipsychotics (risperidone, phenothiazines, haloperidol and butyrophenones),gastric motility drugs (metoclopramide and domperidone), or antihypertensives (methyldopa, reserpine, and verapamil).          Current symptoms:  He is clinically euthyroid aside from fatigue.  Lab Results   Component Value Date    TSH 2.764 02/20/2023    FREET4 1.05 03/11/2021       Growth Hormone Excess:  []  increase hand/foot size []  teeth spacing change    [x]  Denies    []  worse glycemic control []  joint pain     [x]  Denies    Last IGF-1:   Lab Results   Component Value Date    SOMATMDN 115 03/11/2021       Past Medical History:   Diagnosis Date    Anxiety     Depression     Essential hypertension 11/11/2021    Infertility male        Past Surgical History:   Procedure Laterality Date    Urecthrotomy      WISDOM TOOTH EXTRACTION      2 extracted        Review of patient's allergies indicates:  No Known Allergies      Current Outpatient Medications:     amLODIPine (NORVASC) 5 MG tablet, Take 2 tablets (10 mg total) by mouth once daily., Disp: 30 tablet, Rfl: 2    BINAXNOW COVID-19 AG SELF TEST Kit, use as directed, Disp: , Rfl:     famotidine (PEPCID) 20 MG tablet, Take 1 tablet (20 mg total) by mouth 2 (two) times daily., Disp: 14 tablet, Rfl: 0    hydroCHLOROthiazide (HYDRODIURIL) 12.5 MG Tab, Take 1 tablet (12.5 mg total) by mouth once daily., Disp: 90 tablet, Rfl: 3    hyoscyamine (ANASPAZ,LEVSIN) 0.125 mg Tab, Take 1 tablet (125 mcg total) by mouth 3 (three) times daily., Disp: 20 tablet, Rfl: 0    Social History     Socioeconomic History    Marital status:    Tobacco Use    Smoking status: Former     Types: Cigarettes     Start date: 6/27/2009    Smokeless tobacco: Never   Substance and Sexual Activity    Alcohol use: Yes     Comment: occacionally    Drug use: No    Sexual activity: Yes     Partners: Female     Birth control/protection: None     Comment: 11/6/17  with same partner 10 years        Family History    Problem Relation Age of Onset    Hypertension Mother        ROS: see HPI    Objective:   Physical Exam   /60   Pulse 60   Resp 16   Wt 112 kg (246 lb 14.6 oz)   SpO2 99%   BMI 37.54 kg/m²   Wt Readings from Last 3 Encounters:   02/27/23 112 kg (246 lb 14.6 oz)   02/20/23 113.4 kg (250 lb)   02/01/23 114 kg (251 lb 5.2 oz)       Constitutional:  Pleasant,  in no acute distress.   HENT:   Head:    Normocephalic and atraumatic.   Eyes:    EOMI. No scleral icterus. Visual files in tact to confrontation b/l.  Neck:    No thyromegaly, normal ROM of neck  Cardiovascular:  Normal rate  Respiratory:   Effort normal   Neurological:  No tremor, normal speech  Skin:    Skin is warm, dry  Psych:   Normal mood and affect.   Extremity:  No edema, no deformity    Per urology note testicular size 9cc with normal genitalia    LABORATORY REVIEW:    Chemistry        Component Value Date/Time     02/20/2023 1502    K 4.2 02/20/2023 1502     02/20/2023 1502    CO2 24 02/20/2023 1502    BUN 13 02/20/2023 1502    CREATININE 1.0 02/20/2023 1502    GLU 82 02/20/2023 1502        Component Value Date/Time    CALCIUM 9.1 02/20/2023 1502    ALKPHOS 88 02/20/2023 1502    AST 25 02/20/2023 1502    ALT 23 02/20/2023 1502    BILITOT 1.2 (H) 02/20/2023 1502    ESTGFRAFRICA >60.0 12/21/2021 1340    EGFRNONAA >60.0 12/21/2021 1340          Lab Results   Component Value Date    PROLACTIN 24.4 (H) 02/20/2023    PROLACTIN 21.7 (H) 03/11/2021    PROLACTIN 27.2 (H) 03/04/2021    PROLACTIN 10.5 10/07/2019    TSH 2.764 02/20/2023    TSH 0.823 02/01/2023    TSH 0.860 12/21/2021    TSH 1.901 03/11/2021    FREET4 1.05 03/11/2021    ACTH 10 02/20/2023    CORTISOL 1.60 02/20/2023    LABLH 8.8 03/11/2021    LABLH 9.7 03/04/2021    LABLH 9.1 10/07/2019    FSH 17.40 (H) 03/11/2021    FSH 17.50 (H) 03/04/2021    FSH 18.90 (H) 10/07/2019    SOMATMDN 115 03/11/2021     02/20/2023     02/01/2023     12/21/2021      11/11/2021    K 4.2 02/20/2023    K 4.8 02/01/2023    K 4.3 12/21/2021    K 4.2 11/11/2021    CALCIUM 9.1 02/20/2023    CALCIUM 9.4 02/01/2023    CALCIUM 9.5 12/21/2021    CALCIUM 9.6 11/11/2021    ALBUMIN 4.3 02/20/2023    ALBUMIN 4.0 02/01/2023    ALBUMIN 4.0 12/21/2021    ALBUMIN 4.1 11/11/2021    ESTGFRAFRICA >60.0 12/21/2021    ESTGFRAFRICA >60 11/11/2021    ESTGFRAFRICA >60 11/10/2021    ESTGFRAFRICA >60.0 08/12/2020    EGFRNONAA >60.0 12/21/2021    EGFRNONAA >60 11/11/2021    EGFRNONAA >60 11/10/2021    EGFRNONAA >60.0 08/12/2020     3/17/21      Assessment/Plan:     Problem List Items Addressed This Visit          Renal/    High serum follicle stimulating hormone (FSH)     Hx of persistently elevated FSH since 2019 with normal testosterone levels which may be indicative of sertoli cell dysfunction as he has always had normal testosterone levels. It is less likely due to gonadotroph adenoma given the slight elevated prolactin levels and microadenoma per MRI pituitary.           Infertility male - Primary      Infertility likely a gonadal etiology as patient with high FSH, normal LH, normal testosterone and low volume testes (9cc).   Semen analysis was done and pt reports that they did not see any sperm but we have asked that the pt request [facility] send us the results -- or he can get a copy and send it to us via the portal  Possibly due to hyperprolactinemia - see above for plan RE: PRL  Likely will need to f/u with urology if gonadal etiology is most likely           Relevant Medications    cabergoline (DOSTINEX) 0.5 mg tablet    Other Relevant Orders    Follicle Stimulating Hormone    Luteinizing Hormone    Prolactin    Testosterone Panel       Endocrine    Elevated prolactin level     Normal prolactin in 2019 on initial evaluation for infertility however repeat level was slightly elevated since then and remained in the 20s without use of medications that are known to cause  hyperprolactinemia.  No symptoms of galactorrhea or breast tenderness.  Testosterone is also normal. No growth hormone excess with IGF-1 level of 115.   MRI pituitary 3/17/21 shows a diminished enhancement of the left gland that may represent a small adenoma measuring 4 X 4 mm, this rules out any macroadenoma causing stalk compression.   Patient educated his current infertility symptoms most likely are gonadal however will do repeat trial of cabergoline given slightly elevated PRL. He only took Cabergoline x1mo when trial was attempted several yrs ago - and then he did not repeat labs until years later.   Begin Cabergoline 0.25 mg (1/2 tablet) twice per week and repeat labs PRL, testosterone panel, FSH, and LH fasting at 8am in 2mo                Disorder of pituitary gland, unspecified     L pituitary gland with possible small microadenoma on previous MRI  No signs or symptoms c/w growth and PRL remains only mildly elevated  No indication to repeat MRI at this time         Relevant Orders    Follicle Stimulating Hormone       Other    Abnormal endocrine laboratory test finding     Low cortisol on recent labs - however collected at 6pm. He denies recent steroids.  No s/sx of AI - however will get repeat fasting 8am cortisol and ACTH to r/o AI         Relevant Medications    cabergoline (DOSTINEX) 0.5 mg tablet    Other Relevant Orders    Follicle Stimulating Hormone    Luteinizing Hormone    Prolactin    Testosterone Panel    CORTISOL, 8AM    ACTH           Patient Instructions   8am fasting labs (ACTH and cortisol) in next 1-2wks    Restart cabergoline 0.25 mcg (1/2 tablet) twice per week    Please continue this everyday until we repeat your labs in 2 months    In 2 months we will need to do fasting 8am labs to check FSH, LH, testosterone, prolactin    Please get a copy of your semen analysis and send it to us. You can ask the fertility clinic ([facility]) to fax it to us. Our fax number is below.    Aletha  MD Tae  [facility], [address]th Floor  [address] [place], [address]    Office: [phone]  Fax: [phone]      Follow up with us in 6 months       Aletha Strong MD  [facility], [address]th [address]  [address] [place], [address]    Office: [phone]  Fax: [phone]

## 2023-02-27 ENCOUNTER — OFFICE VISIT (OUTPATIENT)
Dept: ENDOCRINOLOGY | Facility: CLINIC | Age: 38
End: 2023-02-27
Payer: COMMERCIAL

## 2023-02-27 VITALS
BODY MASS INDEX: 37.54 KG/M2 | HEART RATE: 60 BPM | DIASTOLIC BLOOD PRESSURE: 60 MMHG | OXYGEN SATURATION: 99 % | WEIGHT: 246.94 LBS | SYSTOLIC BLOOD PRESSURE: 136 MMHG | RESPIRATION RATE: 16 BRPM

## 2023-02-27 DIAGNOSIS — N46.9 INFERTILITY MALE: Primary | ICD-10-CM

## 2023-02-27 DIAGNOSIS — R68.89 ABNORMAL ENDOCRINE LABORATORY TEST FINDING: ICD-10-CM

## 2023-02-27 DIAGNOSIS — R79.89 HIGH SERUM FOLLICLE STIMULATING HORMONE (FSH): ICD-10-CM

## 2023-02-27 DIAGNOSIS — R79.89 ELEVATED PROLACTIN LEVEL: ICD-10-CM

## 2023-02-27 DIAGNOSIS — E23.7 DISORDER OF PITUITARY GLAND, UNSPECIFIED: ICD-10-CM

## 2023-02-27 PROBLEM — R42 DIZZINESS: Status: ACTIVE | Noted: 2023-02-27

## 2023-02-27 PROCEDURE — 3078F PR MOST RECENT DIASTOLIC BLOOD PRESSURE < 80 MM HG: ICD-10-PCS | Mod: CPTII,S$GLB,, | Performed by: STUDENT IN AN ORGANIZED HEALTH CARE EDUCATION/TRAINING PROGRAM

## 2023-02-27 PROCEDURE — 3044F PR MOST RECENT HEMOGLOBIN A1C LEVEL <7.0%: ICD-10-PCS | Mod: CPTII,S$GLB,, | Performed by: STUDENT IN AN ORGANIZED HEALTH CARE EDUCATION/TRAINING PROGRAM

## 2023-02-27 PROCEDURE — 1160F PR REVIEW ALL MEDS BY PRESCRIBER/CLIN PHARMACIST DOCUMENTED: ICD-10-PCS | Mod: CPTII,S$GLB,, | Performed by: STUDENT IN AN ORGANIZED HEALTH CARE EDUCATION/TRAINING PROGRAM

## 2023-02-27 PROCEDURE — 3008F PR BODY MASS INDEX (BMI) DOCUMENTED: ICD-10-PCS | Mod: CPTII,S$GLB,, | Performed by: STUDENT IN AN ORGANIZED HEALTH CARE EDUCATION/TRAINING PROGRAM

## 2023-02-27 PROCEDURE — 3008F BODY MASS INDEX DOCD: CPT | Mod: CPTII,S$GLB,, | Performed by: STUDENT IN AN ORGANIZED HEALTH CARE EDUCATION/TRAINING PROGRAM

## 2023-02-27 PROCEDURE — 3075F PR MOST RECENT SYSTOLIC BLOOD PRESS GE 130-139MM HG: ICD-10-PCS | Mod: CPTII,S$GLB,, | Performed by: STUDENT IN AN ORGANIZED HEALTH CARE EDUCATION/TRAINING PROGRAM

## 2023-02-27 PROCEDURE — 99999 PR PBB SHADOW E&M-EST. PATIENT-LVL IV: ICD-10-PCS | Mod: PBBFAC,,, | Performed by: STUDENT IN AN ORGANIZED HEALTH CARE EDUCATION/TRAINING PROGRAM

## 2023-02-27 PROCEDURE — 3075F SYST BP GE 130 - 139MM HG: CPT | Mod: CPTII,S$GLB,, | Performed by: STUDENT IN AN ORGANIZED HEALTH CARE EDUCATION/TRAINING PROGRAM

## 2023-02-27 PROCEDURE — 1160F RVW MEDS BY RX/DR IN RCRD: CPT | Mod: CPTII,S$GLB,, | Performed by: STUDENT IN AN ORGANIZED HEALTH CARE EDUCATION/TRAINING PROGRAM

## 2023-02-27 PROCEDURE — 1159F PR MEDICATION LIST DOCUMENTED IN MEDICAL RECORD: ICD-10-PCS | Mod: CPTII,S$GLB,, | Performed by: STUDENT IN AN ORGANIZED HEALTH CARE EDUCATION/TRAINING PROGRAM

## 2023-02-27 PROCEDURE — 1159F MED LIST DOCD IN RCRD: CPT | Mod: CPTII,S$GLB,, | Performed by: STUDENT IN AN ORGANIZED HEALTH CARE EDUCATION/TRAINING PROGRAM

## 2023-02-27 PROCEDURE — 99999 PR PBB SHADOW E&M-EST. PATIENT-LVL IV: CPT | Mod: PBBFAC,,, | Performed by: STUDENT IN AN ORGANIZED HEALTH CARE EDUCATION/TRAINING PROGRAM

## 2023-02-27 PROCEDURE — 3044F HG A1C LEVEL LT 7.0%: CPT | Mod: CPTII,S$GLB,, | Performed by: STUDENT IN AN ORGANIZED HEALTH CARE EDUCATION/TRAINING PROGRAM

## 2023-02-27 PROCEDURE — 99214 OFFICE O/P EST MOD 30 MIN: CPT | Mod: S$GLB,,, | Performed by: STUDENT IN AN ORGANIZED HEALTH CARE EDUCATION/TRAINING PROGRAM

## 2023-02-27 PROCEDURE — 99214 PR OFFICE/OUTPT VISIT, EST, LEVL IV, 30-39 MIN: ICD-10-PCS | Mod: S$GLB,,, | Performed by: STUDENT IN AN ORGANIZED HEALTH CARE EDUCATION/TRAINING PROGRAM

## 2023-02-27 PROCEDURE — 3078F DIAST BP <80 MM HG: CPT | Mod: CPTII,S$GLB,, | Performed by: STUDENT IN AN ORGANIZED HEALTH CARE EDUCATION/TRAINING PROGRAM

## 2023-02-27 RX ORDER — CABERGOLINE 0.5 MG/1
0.25 TABLET ORAL
Qty: 30 TABLET | Refills: 2 | Status: SHIPPED | OUTPATIENT
Start: 2023-02-27

## 2023-02-27 NOTE — PROGRESS NOTES
I have reviewed and concur with Dr. Aletha Lau's history, physical, assessment, and plan.  I have personally interviewed and examined the patient.  See below addendum for my evaluation and additional findings.    Patient with hx of mildly elevated prolactin but with high FSH and normal testosterone in the past.  Per patient had semen analysis done in the past that was abnormal but will need to request these results.  He was briefly on cabergoline but off after running out with prolactin still mildly elevated.  Pituitary MRI with possible microadenoma, likely prolactinoma.  Discussed that with hx of elevated FSH this may be reflective of early primary testicular failure with infertility not impacted by hyperprolactinemia but will confirm by resuming cabergoline 0.25 mg twice weekly then rechecking labs in 2 month(s).  Not having symptoms of adrenal insufficiency aside from lightheadedness with previous labs drawn at 4 pm with low normal ACTH and low cortisol.  Will repeat testing for adrenal insufficiency at 8 am.      Alan Armstrong MD

## 2023-02-27 NOTE — ASSESSMENT & PLAN NOTE
Hx of persistently elevated FSH since 2019 with normal testosterone levels which may be indicative of sertoli cell dysfunction as he has always had normal testosterone levels. It is less likely due to gonadotroph adenoma given the slight elevated prolactin levels and microadenoma per MRI pituitary.

## 2023-02-27 NOTE — ASSESSMENT & PLAN NOTE
Infertility likely a gonadal etiology as patient with high FSH, normal LH, normal testosterone and low volume testes (9cc).   Semen analysis was done and pt reports that they did not see any sperm but we have asked that the pt request [facility] send us the results -- or he can get a copy and send it to us via the portal  Possibly due to hyperprolactinemia - see above for plan RE: PRL  Likely will need to f/u with urology if gonadal etiology is most likely

## 2023-02-27 NOTE — ASSESSMENT & PLAN NOTE
Normal prolactin in 2019 on initial evaluation for infertility however repeat level was slightly elevated since then and remained in the 20s without use of medications that are known to cause hyperprolactinemia.  No symptoms of galactorrhea or breast tenderness.  Testosterone is also normal. No growth hormone excess with IGF-1 level of 115.   MRI pituitary 3/17/21 shows a diminished enhancement of the left gland that may represent a small adenoma measuring 4 X 4 mm, this rules out any macroadenoma causing stalk compression.   Patient educated his current infertility symptoms most likely are gonadal however will do repeat trial of cabergoline given slightly elevated PRL. He only took Cabergoline x1mo when trial was attempted several yrs ago - and then he did not repeat labs until years later.   Begin Cabergoline 0.25 mg (1/2 tablet) twice per week and repeat labs PRL, testosterone panel, FSH, and LH fasting at 8am in 2mo

## 2023-02-27 NOTE — PATIENT INSTRUCTIONS
8am fasting labs (ACTH and cortisol) in next 1-2wks    Restart cabergoline 0.25 mcg (1/2 tablet) twice per week    Please continue this everyday until we repeat your labs in 2 months    In 2 months we will need to do fasting 8am labs to check FSH, LH, testosterone, prolactin    Please get a copy of your semen analysis and send it to us. You can ask the fertility clinic ([facility]) to fax it to us. Our fax number is below.    Aletha Strong MD  [facility], [address]th [address]  [address] [place], [address]    Office: [phone]  Fax: [phone]      Follow up with us in 6 months

## 2023-02-27 NOTE — ASSESSMENT & PLAN NOTE
Low cortisol on recent labs - however collected at 6pm. He denies recent steroids.  No s/sx of AI - however will get repeat fasting 8am cortisol and ACTH to r/o AI

## 2023-02-27 NOTE — ASSESSMENT & PLAN NOTE
L pituitary gland with possible small microadenoma on previous MRI  No signs or symptoms c/w growth and PRL remains only mildly elevated  No indication to repeat MRI at this time

## 2023-03-01 ENCOUNTER — OFFICE VISIT (OUTPATIENT)
Dept: FAMILY MEDICINE | Facility: CLINIC | Age: 38
End: 2023-03-01
Payer: COMMERCIAL

## 2023-03-01 VITALS
HEART RATE: 63 BPM | RESPIRATION RATE: 16 BRPM | BODY MASS INDEX: 37.69 KG/M2 | OXYGEN SATURATION: 98 % | WEIGHT: 248.69 LBS | DIASTOLIC BLOOD PRESSURE: 92 MMHG | SYSTOLIC BLOOD PRESSURE: 140 MMHG | TEMPERATURE: 98 F | HEIGHT: 68 IN

## 2023-03-01 DIAGNOSIS — E29.1 TESTICULAR FAILURE: ICD-10-CM

## 2023-03-01 DIAGNOSIS — R79.89 ELEVATED PROLACTIN LEVEL: ICD-10-CM

## 2023-03-01 DIAGNOSIS — I10 PRIMARY HYPERTENSION: Primary | ICD-10-CM

## 2023-03-01 DIAGNOSIS — E66.9 OBESITY (BMI 35.0-39.9 WITHOUT COMORBIDITY): ICD-10-CM

## 2023-03-01 PROCEDURE — 1160F PR REVIEW ALL MEDS BY PRESCRIBER/CLIN PHARMACIST DOCUMENTED: ICD-10-PCS | Mod: CPTII,S$GLB,, | Performed by: INTERNAL MEDICINE

## 2023-03-01 PROCEDURE — 1159F MED LIST DOCD IN RCRD: CPT | Mod: CPTII,S$GLB,, | Performed by: INTERNAL MEDICINE

## 2023-03-01 PROCEDURE — 99999 PR PBB SHADOW E&M-EST. PATIENT-LVL III: CPT | Mod: PBBFAC,,, | Performed by: INTERNAL MEDICINE

## 2023-03-01 PROCEDURE — 3008F PR BODY MASS INDEX (BMI) DOCUMENTED: ICD-10-PCS | Mod: CPTII,S$GLB,, | Performed by: INTERNAL MEDICINE

## 2023-03-01 PROCEDURE — 99999 PR PBB SHADOW E&M-EST. PATIENT-LVL III: ICD-10-PCS | Mod: PBBFAC,,, | Performed by: INTERNAL MEDICINE

## 2023-03-01 PROCEDURE — 3080F DIAST BP >= 90 MM HG: CPT | Mod: CPTII,S$GLB,, | Performed by: INTERNAL MEDICINE

## 2023-03-01 PROCEDURE — 1159F PR MEDICATION LIST DOCUMENTED IN MEDICAL RECORD: ICD-10-PCS | Mod: CPTII,S$GLB,, | Performed by: INTERNAL MEDICINE

## 2023-03-01 PROCEDURE — 3077F PR MOST RECENT SYSTOLIC BLOOD PRESSURE >= 140 MM HG: ICD-10-PCS | Mod: CPTII,S$GLB,, | Performed by: INTERNAL MEDICINE

## 2023-03-01 PROCEDURE — 3044F PR MOST RECENT HEMOGLOBIN A1C LEVEL <7.0%: ICD-10-PCS | Mod: CPTII,S$GLB,, | Performed by: INTERNAL MEDICINE

## 2023-03-01 PROCEDURE — 99214 OFFICE O/P EST MOD 30 MIN: CPT | Mod: S$GLB,,, | Performed by: INTERNAL MEDICINE

## 2023-03-01 PROCEDURE — 3044F HG A1C LEVEL LT 7.0%: CPT | Mod: CPTII,S$GLB,, | Performed by: INTERNAL MEDICINE

## 2023-03-01 PROCEDURE — 3008F BODY MASS INDEX DOCD: CPT | Mod: CPTII,S$GLB,, | Performed by: INTERNAL MEDICINE

## 2023-03-01 PROCEDURE — 3080F PR MOST RECENT DIASTOLIC BLOOD PRESSURE >= 90 MM HG: ICD-10-PCS | Mod: CPTII,S$GLB,, | Performed by: INTERNAL MEDICINE

## 2023-03-01 PROCEDURE — 3077F SYST BP >= 140 MM HG: CPT | Mod: CPTII,S$GLB,, | Performed by: INTERNAL MEDICINE

## 2023-03-01 PROCEDURE — 1160F RVW MEDS BY RX/DR IN RCRD: CPT | Mod: CPTII,S$GLB,, | Performed by: INTERNAL MEDICINE

## 2023-03-01 PROCEDURE — 99214 PR OFFICE/OUTPT VISIT, EST, LEVL IV, 30-39 MIN: ICD-10-PCS | Mod: S$GLB,,, | Performed by: INTERNAL MEDICINE

## 2023-03-01 RX ORDER — AMLODIPINE BESYLATE 5 MG/1
5 TABLET ORAL DAILY
Qty: 90 TABLET | Refills: 3 | Status: SHIPPED | OUTPATIENT
Start: 2023-03-01 | End: 2024-03-01

## 2023-03-01 NOTE — PROGRESS NOTES
Subjective:       Patient ID: Rodríguez Graves is a pleasant 37 y.o. Black or  male patient    Chief Complaint: Follow-up (ER FOLLOW UP) and Dizziness (MEDS)      Patient is a new pt to me but is established pt from Dr. Justin.    HPI     Pt with PMH as per list of problems below who comes today for a regular f-up visit.   He comes today to f-up after ED visit. He is here with his wife.  Went there on 02/20/2023 due to lightheadedness and loss of balance x 4-5 days.   Pt also mentioned that in 2021, he was Dx with elevated prolactin and had a small pituitary adenoma.. He was started on trial of cabergoline but did not follow-up afterwards. He denies vision changes or headaches. No galactorrhea.   CT Head with no acute abnormality. Blood sugar within normal limits. EKG without sign of arrhythmia. Physical exam unremarkable; no signs of neurological deficit. Normal orthostatic vital signs. Will discharge with follow-up to Neurology. Is here with his wife.  Carolyn dodson.  He is no specific concern to report today.    He was seen by Endocrinologist regarding prolactin, see excellent notes.  He relates the dizziness to HCTZ that he states make him dehydrated.  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 He stopped HCTZ recently and no more dizziness. His BP is a bit elevated at his place.     Patient Active Problem List   Diagnosis    Obesity (BMI 35.0-39.9 without comorbidity)    Urethral stricture    Testicular failure    Erectile dysfunction due to arterial insufficiency    Elevated prolactin level    High serum follicle stimulating hormone (FSH)    Snoring    Chest pain    Essential hypertension    Abnormal endocrine laboratory test finding    Infertility male    Dizziness    Disorder of pituitary gland, unspecified          ACTIVE MEDICAL ISSUES:  Documented in Problem List     PAST MEDICAL HISTORY  Documented     PAST SURGICAL HISTORY:  Documented     SOCIAL  "HISTORY:  Documented     FAMILY HISTORY:  Documented     ALLERGIES AND MEDICATIONS: updated and reviewed.  Documented    Review of Systems   Constitutional: Negative.    HENT: Negative.     Respiratory: Negative.     Cardiovascular: Negative.    Gastrointestinal: Negative.    Genitourinary: Negative.    Musculoskeletal: Negative.    Neurological:  Positive for dizziness.   Psychiatric/Behavioral: Negative.       Objective:      Physical Exam  Constitutional:       General: He is not in acute distress.     Appearance: Normal appearance.      Comments: Bilateral gynecomastia   HENT:      Head: Normocephalic and atraumatic.      Right Ear: Tympanic membrane normal.      Left Ear: Tympanic membrane normal.   Eyes:      Conjunctiva/sclera: Conjunctivae normal.   Cardiovascular:      Rate and Rhythm: Normal rate and regular rhythm.      Heart sounds: Normal heart sounds. No murmur heard.    No friction rub. No gallop.   Pulmonary:      Effort: Pulmonary effort is normal.      Breath sounds: Normal breath sounds. No wheezing or rales.   Musculoskeletal:      Cervical back: Neck supple.   Skin:     General: Skin is warm and dry.   Neurological:      Mental Status: He is alert and oriented to person, place, and time.   Psychiatric:         Behavior: Behavior normal.         Thought Content: Thought content normal.         Judgment: Judgment normal.       Vitals:    03/01/23 1050   BP: (!) 140/92   BP Location: Right arm   Patient Position: Sitting   BP Method: Large (Manual)   Pulse: 63   Resp: 16   Temp: 97.6 °F (36.4 °C)   TempSrc: Oral   SpO2: 98%   Weight: 112.8 kg (248 lb 10.9 oz)   Height: 5' 8" (1.727 m)     Body mass index is 37.81 kg/m².    RESULTS: Reviewed labs from last 12 months    Last Lab Results:     Lab Results   Component Value Date    WBC 9.20 02/20/2023    HGB 14.6 02/20/2023    HCT 42.2 02/20/2023     02/20/2023     02/20/2023    K 4.2 02/20/2023     02/20/2023    CO2 24 02/20/2023    " BUN 13 02/20/2023    CREATININE 1.0 02/20/2023    CALCIUM 9.1 02/20/2023    ALBUMIN 4.3 02/20/2023    AST 25 02/20/2023    ALT 23 02/20/2023    CHOL 209 (H) 02/01/2023    TRIG 43 02/01/2023    HDL 70 02/01/2023    LDLCALC 130.4 02/01/2023    HGBA1C 5.2 02/01/2023    TSH 2.764 02/20/2023    PSA 0.58 11/06/2017       Assessment:       1. Primary hypertension    2. Obesity (BMI 35.0-39.9 without comorbidity)    3. Elevated prolactin level    4. Testicular failure        Plan:   Rodríguez was seen today for follow-up and dizziness.    Diagnoses and all orders for this visit:    Primary hypertension  -     amLODIPine (NORVASC) 5 MG tablet; Take 1 tablet (5 mg total) by mouth once daily.    Will go back to [facility] from HCTZ. 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 Will f-up with usual PCP.    Obesity (BMI 35.0-39.9 without comorbidity)    F-up PCP.    Elevated prolactin level    See notes from Endocrinologist.    Testicular failure      No follow-ups on file.    This note was created by combination of typed  and M-Modal dictation.  Transcription errors may be present.  If there are any questions, please contact me.

## 2023-03-01 NOTE — PROGRESS NOTES
Health Maintenance Due   Topic     COVID-19 Vaccine (3 - Booster for Pfizer series)     Influenza Vaccine (1)

## 2023-03-06 ENCOUNTER — PATIENT MESSAGE (OUTPATIENT)
Dept: UROLOGY | Facility: CLINIC | Age: 38
End: 2023-03-06
Payer: COMMERCIAL

## 2023-03-08 ENCOUNTER — LAB VISIT (OUTPATIENT)
Dept: LAB | Facility: HOSPITAL | Age: 38
End: 2023-03-08
Attending: EMERGENCY MEDICINE
Payer: COMMERCIAL

## 2023-03-08 DIAGNOSIS — R68.89 ABNORMAL ENDOCRINE LABORATORY TEST FINDING: ICD-10-CM

## 2023-03-08 LAB — CORTIS SERPL-MCNC: 9.8 UG/DL (ref 4.3–22.4)

## 2023-03-08 PROCEDURE — 36415 COLL VENOUS BLD VENIPUNCTURE: CPT | Mod: PO | Performed by: STUDENT IN AN ORGANIZED HEALTH CARE EDUCATION/TRAINING PROGRAM

## 2023-03-08 PROCEDURE — 82533 TOTAL CORTISOL: CPT | Performed by: STUDENT IN AN ORGANIZED HEALTH CARE EDUCATION/TRAINING PROGRAM

## 2023-03-08 PROCEDURE — 82024 ASSAY OF ACTH: CPT | Performed by: STUDENT IN AN ORGANIZED HEALTH CARE EDUCATION/TRAINING PROGRAM

## 2023-03-10 LAB — ACTH PLAS-MCNC: 18 PG/ML (ref 0–46)

## 2023-03-23 ENCOUNTER — PATIENT MESSAGE (OUTPATIENT)
Dept: ADMINISTRATIVE | Facility: HOSPITAL | Age: 38
End: 2023-03-23
Payer: COMMERCIAL

## 2023-03-30 ENCOUNTER — TELEPHONE (OUTPATIENT)
Dept: ADMINISTRATIVE | Facility: HOSPITAL | Age: 38
End: 2023-03-30
Payer: COMMERCIAL

## 2023-03-30 ENCOUNTER — PATIENT OUTREACH (OUTPATIENT)
Dept: ADMINISTRATIVE | Facility: HOSPITAL | Age: 38
End: 2023-03-30
Payer: COMMERCIAL

## 2023-03-30 VITALS — SYSTOLIC BLOOD PRESSURE: 150 MMHG | DIASTOLIC BLOOD PRESSURE: 87 MMHG

## 2023-03-30 NOTE — PROGRESS NOTES
HM and immunization's reviewed and updated. BP portal push response. 150/87 - sent message back.

## 2023-05-18 ENCOUNTER — PATIENT MESSAGE (OUTPATIENT)
Dept: ADMINISTRATIVE | Facility: HOSPITAL | Age: 38
End: 2023-05-18
Payer: COMMERCIAL

## 2023-05-18 ENCOUNTER — PATIENT OUTREACH (OUTPATIENT)
Dept: ADMINISTRATIVE | Facility: HOSPITAL | Age: 38
End: 2023-05-18
Payer: COMMERCIAL

## 2023-05-18 NOTE — PROGRESS NOTES
HM and immunization's reviewed and updated. Pt on blood pressure care gap, need recent blood pressure reading. Left message for patient to return call.

## 2023-06-12 ENCOUNTER — PATIENT MESSAGE (OUTPATIENT)
Dept: ADMINISTRATIVE | Facility: HOSPITAL | Age: 38
End: 2023-06-12
Payer: COMMERCIAL

## 2023-06-24 DIAGNOSIS — I10 UNCONTROLLED HYPERTENSION: ICD-10-CM

## 2023-06-24 NOTE — TELEPHONE ENCOUNTER
No care due was identified.  [facility] Embedded Care Due Messages. Reference number: [id number].   6/24/2023 8:33:54 AM CDT

## 2023-06-26 NOTE — TELEPHONE ENCOUNTER
Last Office Visit Info:   The patient's last visit with Johnny Justin MD was on 2/1/2023.    The patient's last visit in current department was on 3/1/2023.        Last CBC Results:   Lab Results   Component Value Date    WBC 9.20 02/20/2023    HGB 14.6 02/20/2023    HCT 42.2 02/20/2023     02/20/2023       Last CMP Results  Lab Results   Component Value Date     02/20/2023    K 4.2 02/20/2023     02/20/2023    CO2 24 02/20/2023    BUN 13 02/20/2023    CREATININE 1.0 02/20/2023    CALCIUM 9.1 02/20/2023    ALBUMIN 4.3 02/20/2023    AST 25 02/20/2023    ALT 23 02/20/2023       Last Lipids  Lab Results   Component Value Date    CHOL 209 (H) 02/01/2023    TRIG 43 02/01/2023    HDL 70 02/01/2023    LDLCALC 130.4 02/01/2023       Last A1C  Lab Results   Component Value Date    HGBA1C 5.2 02/01/2023       Last TSH  Lab Results   Component Value Date    TSH 2.764 02/20/2023             Current Med Refills  Medication List with Changes/Refills   Current Medications    AMLODIPINE (NORVASC) 5 MG TABLET    Take 1 tablet (5 mg total) by mouth once daily.       Start Date: 3/1/2023  End Date: 2/29/2024    BINAXNOW COVID-19 AG SELF TEST KIT    use as directed       Start Date: 12/15/2022End Date: --    CABERGOLINE (DOSTINEX) 0.5 MG TABLET    Take 0.5 tablets (0.25 mg total) by mouth twice a week.       Start Date: 2/27/2023 End Date: --    HYOSCYAMINE (ANASPAZ,LEVSIN) 0.125 MG TAB    Take 1 tablet (125 mcg total) by mouth 3 (three) times daily.       Start Date: 5/7/2022  End Date: 3/1/2023       Order(s) placed per written order guidelines: none    Please advise.

## 2023-06-28 RX ORDER — HYDROCHLOROTHIAZIDE 12.5 MG/1
TABLET ORAL
Qty: 90 TABLET | Refills: 1 | Status: SHIPPED | OUTPATIENT
Start: 2023-06-28

## 2023-07-26 ENCOUNTER — PATIENT OUTREACH (OUTPATIENT)
Dept: ADMINISTRATIVE | Facility: HOSPITAL | Age: 38
End: 2023-07-26
Payer: COMMERCIAL

## 2023-07-26 ENCOUNTER — PATIENT MESSAGE (OUTPATIENT)
Dept: ADMINISTRATIVE | Facility: HOSPITAL | Age: 38
End: 2023-07-26
Payer: COMMERCIAL

## 2023-07-26 NOTE — PROGRESS NOTES
Additional Care Coordinator Notes:     HM and immunization's reviewed and updated.    Further Action Needed If Patient Returns Outreach:  Need remote home blood pressure reading.   Population Health Chart Review & Patient Outreach Details:     Reason for Outreach Encounter:     [x]  Non-Compliant Report   []  Payor Report ([employer], PHN, BCBS, MSSP, MCIP, UHC, etc.)   []  Pre-Visit Chart Review     Updates Requested / Reviewed:     [x]  Care Everywhere    []     []  External Sources ([facility], [facility], DIS, etc.)   []  Care Team Updated    Patient Outreach Method:    [x]  Telephone Outreach Completed   [] Successful   [x] Left Voicemail   [] Unable to Contact (wrong number, no voicemail)  [x]  [facility] Outreach Sent  []  Letter Outreach Mailed  []  Fax Sent for External Records  []  External Records Upload    Health Maintenance Topics Addressed and Outreach Outcomes / Actions Taken:        []      Breast Cancer Screening []  Mammo Scheduled      []  External Records Requested     []  Added Reminder to Complete to Upcoming Primary Care Appt Notes     []  Patient Declined     []  Patient Will Call Back to Schedule     []  Patient Will Schedule with External Provider / Order Routed if Applicable             []       Cervical Cancer Screening []  Pap Scheduled      []  External Records Requested     []  Added Reminder to Complete to Upcoming Primary Care Appt Notes     []  Patient Declined     []  Patient Will Call Back to Schedule     []  Patient Will Schedule with External Provider               []          Colorectal Cancer Screening []  Colonoscopy Case Request or Referral Placed     []  External Records Requested     []  Added Reminder to Complete to Upcoming Primary Care Appt Notes     []  Patient Declined     []  Patient Will Call Back to Schedule     []  Patient Will Schedule with External Provider     []  Fit Kit Mailed (add the SmartPhrase under additional notes)     [] PAT Scheduled      []  Cologuard orders placed.      []  Reminded Patient to Complete Home Test             []      Diabetic Eye Exam []  Eye Camera Scheduled or Optometry Referral Placed     []  External Records Requested     []  Added Reminder to Complete to Upcoming Primary Care Appt Notes     []  Patient Declined     []  Patient Will Call Back to Schedule     []  Patient Will Schedule with External Provider             [x]      Blood Pressure Control []  Primary Care Follow Up Visit Scheduled     []  Remote Blood Pressure Reading Captured     []  Added Reminder to Complete to Upcoming Primary Care Appt Notes     []  Patient Declined     []  Patient Will Call Back / Patient Will Send Portal Message with Reading     []  Patient Will Call Back to Schedule Provider Visit             []       HbA1c & Other Labs []  Lab Appt Scheduled for Due Labs     []  Primary Care Follow Up Visit Scheduled      []  Reminded Patient to Complete Home Test     []  Added Reminder to Complete to Upcoming Primary Care Appt Notes     []  Patient Declined     []  Patient Will Call Back to Schedule     []  Patient Will Schedule with External Provider / Order Routed if Applicable           []    Schedule Primary Care Appt []  Primary Care Appt Scheduled     []  Patient Declined     []  Patient Will Call Back to Schedule     []  Pt Established with External Provider & Updated Care Team             []      Medication Adherence []  Primary Care Appointment Scheduled     []  Added Reminder to Upcoming Primary Care Appt Notes     []  Patient Reminded to  Prescription     []  Patient Declined, Provider Notified if Needed     []  Sent Provider Message to Review and/or Add Exclusion to Problem List             []      Osteoporosis Screening []  DXA Appointment Scheduled     []  External Records Requested     []  Added Reminder to Complete to Upcoming Primary Care Appt Notes     []  Patient Declined     []  Patient Will Call Back to Schedule     []  Patient Will  Schedule with External Provider / Order Routed if Applicable

## 2023-09-13 ENCOUNTER — PATIENT MESSAGE (OUTPATIENT)
Dept: ADMINISTRATIVE | Facility: HOSPITAL | Age: 38
End: 2023-09-13
Payer: COMMERCIAL

## 2023-09-27 ENCOUNTER — PATIENT MESSAGE (OUTPATIENT)
Dept: ADMINISTRATIVE | Facility: HOSPITAL | Age: 38
End: 2023-09-27
Payer: COMMERCIAL

## 2023-10-27 ENCOUNTER — PATIENT MESSAGE (OUTPATIENT)
Dept: ADMINISTRATIVE | Facility: HOSPITAL | Age: 38
End: 2023-10-27
Payer: COMMERCIAL

## 2023-10-27 ENCOUNTER — PATIENT OUTREACH (OUTPATIENT)
Dept: ADMINISTRATIVE | Facility: HOSPITAL | Age: 38
End: 2023-10-27
Payer: COMMERCIAL

## 2023-10-27 NOTE — PROGRESS NOTES
Population Health Chart Review & Patient Outreach Details      Further Action Needed If Patient Returns Outreach:      Need remote home blood pressure reading.       Updates Requested / Reviewed:     [x]  Care Everywhere    []     []  External Sources ([facility], [facility], DIS, etc.)    [] [facility]   [] [facility]   [] Other:    []  Care Team Updated   []  Removed  or Duplicate Orders   []  Immunization Reconciliation Completed / Queried    [] [place]   [] [place]   [] [place]   [] [place]      Health Maintenance Topics Addressed and Outreach Outcomes / Actions Taken:             Breast Cancer Screening []  Mammogram Order Placed    []  Mammogram Screening Scheduled    []  External Records Requested & Care Team Updated if Applicable    []  External Records Uploaded & Care Team Updated if Applicable    []  Pt Declined Scheduling Mammogram    []  Pt Will Schedule with External Provider / Order Routed & Care Team Updated if Applicable              Cervical Cancer Screening []  Pap Smear Scheduled in Primary Care or OBGYN    []  External Records Requested & Care Team Updated if Applicable       []  External Records Uploaded, Care Team Updated, & History Updated if Applicable    []  Patient Declined Scheduling Pap Smear    []  Patient Will Schedule with External Provider & Care Team Updated if Applicable                  Colorectal Cancer Screening []  Colonoscopy Case Request / Referral / Home Test Order Placed    []  External Records Requested & Care Team Updated if Applicable    []  External Records Uploaded, Care Team Updated, & History Updated if Applicable    []  Patient Declined Completing Colon Cancer Screening    []  Patient Will Schedule with External Provider & Care Team Updated if Applicable    []  Fit Kit Mailed (add the SmartPhrase under additional notes)    []  Reminded Patient to Complete Home Test                Diabetic Eye Exam []  Eye Exam Screening Order Placed    []  Eye Camera  Scheduled or Optometry/Ophthalmology Referral Placed    []  External Records Requested & Care Team Updated if Applicable    []  External Records Uploaded, Care Team Updated, & History Updated if Applicable    []  Patient Declined Scheduling Eye Exam    []  Patient Will Schedule with External Provider & Care Team Updated if Applicable             Blood Pressure Control []  Primary Care Follow Up Visit Scheduled     []  Remote Blood Pressure Reading Captured    []  Patient Declined Remote Reading or Scheduling Appt - Escalated to PCP    []  Patient Will Call Back or Send Portal Message with Reading                 HbA1c & Other Labs []  Overdue Lab(s) Ordered    []  Overdue Lab(s) Scheduled    []  External Records Uploaded & Care Team Updated if Applicable    []  Primary Care Follow Up Visit Scheduled     []  Reminded Patient to Complete A1c Home Test    []  Patient Declined Scheduling Labs or Will Call Back to Schedule    []  Patient Will Schedule with External Provider / Order Routed, & Care Team Updated if Applicable           Primary Care Appointment []  Primary Care Appt Scheduled    []  Patient Declined Scheduling or Will Call Back to Schedule    []  Pt Established with External Provider, Updated Care Team, & Informed Pt to Notify Payor if Applicable           Medication Adherence /    Statin Use []  Primary Care Appointment Scheduled    []  Patient Reminded to  Prescription    []  Patient Declined, Provider Notified if Needed    []  Sent Provider Message to Review to Evaluate Pt for Statin, Add Exclusion Dx Codes, Document   Exclusion in Problem List, Change Statin Intensity Level to Moderate or High Intensity if Applicable                Osteoporosis Screening []  Dexa Order Placed    []  Dexa Appointment Scheduled    []  External Records Requested & Care Team Updated    []  External Records Uploaded, Care Team Updated, & History Updated if Applicable    []  Patient Declined Scheduling Dexa or Will Call  Back to Schedule    []  Patient Will Schedule with External Provider / Order Routed & Care Team Updated if Applicable       Additional Notes:      Payor report. -- remote blood pressure; also due for visit pcp htn. Left message for patient to return call. Sent [facility] message.

## 2023-11-06 ENCOUNTER — PATIENT MESSAGE (OUTPATIENT)
Dept: ADMINISTRATIVE | Facility: HOSPITAL | Age: 38
End: 2023-11-06
Payer: COMMERCIAL

## 2023-11-20 ENCOUNTER — PATIENT MESSAGE (OUTPATIENT)
Dept: ADMINISTRATIVE | Facility: HOSPITAL | Age: 38
End: 2023-11-20
Payer: COMMERCIAL

## 2023-11-20 ENCOUNTER — PATIENT OUTREACH (OUTPATIENT)
Dept: ADMINISTRATIVE | Facility: HOSPITAL | Age: 38
End: 2023-11-20
Payer: COMMERCIAL

## 2023-11-20 ENCOUNTER — TELEPHONE (OUTPATIENT)
Dept: ADMINISTRATIVE | Facility: HOSPITAL | Age: 38
End: 2023-11-20
Payer: COMMERCIAL

## 2023-11-20 VITALS — SYSTOLIC BLOOD PRESSURE: 146 MMHG | DIASTOLIC BLOOD PRESSURE: 90 MMHG

## 2023-11-20 NOTE — PROGRESS NOTES

## 2024-01-04 ENCOUNTER — OFFICE VISIT (OUTPATIENT)
Dept: FAMILY MEDICINE | Facility: CLINIC | Age: 39
End: 2024-01-04
Payer: COMMERCIAL

## 2024-01-04 VITALS
BODY MASS INDEX: 38.9 KG/M2 | TEMPERATURE: 98 F | WEIGHT: 256.63 LBS | RESPIRATION RATE: 18 BRPM | HEART RATE: 67 BPM | DIASTOLIC BLOOD PRESSURE: 88 MMHG | HEIGHT: 68 IN | SYSTOLIC BLOOD PRESSURE: 136 MMHG | OXYGEN SATURATION: 98 %

## 2024-01-04 DIAGNOSIS — M54.41 ACUTE RIGHT-SIDED LOW BACK PAIN WITH RIGHT-SIDED SCIATICA: Primary | ICD-10-CM

## 2024-01-04 LAB
BILIRUB SERPL-MCNC: ABNORMAL MG/DL
BILIRUB UR QL STRIP: NEGATIVE
BLOOD URINE, POC: ABNORMAL
CLARITY UR REFRACT.AUTO: CLEAR
CLARITY, POC UA: CLEAR
COLOR UR AUTO: YELLOW
COLOR, POC UA: YELLOW
GLUCOSE UR QL STRIP: NEGATIVE
GLUCOSE UR QL STRIP: NORMAL
HGB UR QL STRIP: NEGATIVE
KETONES UR QL STRIP: ABNORMAL
KETONES UR QL STRIP: NEGATIVE
LEUKOCYTE ESTERASE UR QL STRIP: NEGATIVE
LEUKOCYTE ESTERASE URINE, POC: ABNORMAL
NITRITE UR QL STRIP: NEGATIVE
NITRITE, POC UA: ABNORMAL
PH UR STRIP: 5 [PH] (ref 5–8)
PH, POC UA: 5
PROT UR QL STRIP: NEGATIVE
PROTEIN, POC: ABNORMAL
SP GR UR STRIP: 1.02 (ref 1–1.03)
SPECIFIC GRAVITY, POC UA: 1.02
URN SPEC COLLECT METH UR: NORMAL
UROBILINOGEN, POC UA: NORMAL

## 2024-01-04 PROCEDURE — 81002 URINALYSIS NONAUTO W/O SCOPE: CPT | Mod: S$GLB,,, | Performed by: PHYSICIAN ASSISTANT

## 2024-01-04 PROCEDURE — 1160F RVW MEDS BY RX/DR IN RCRD: CPT | Mod: CPTII,S$GLB,, | Performed by: PHYSICIAN ASSISTANT

## 2024-01-04 PROCEDURE — 99214 OFFICE O/P EST MOD 30 MIN: CPT | Mod: S$GLB,,, | Performed by: PHYSICIAN ASSISTANT

## 2024-01-04 PROCEDURE — 1159F MED LIST DOCD IN RCRD: CPT | Mod: CPTII,S$GLB,, | Performed by: PHYSICIAN ASSISTANT

## 2024-01-04 PROCEDURE — 81003 URINALYSIS AUTO W/O SCOPE: CPT | Performed by: PHYSICIAN ASSISTANT

## 2024-01-04 PROCEDURE — 3079F DIAST BP 80-89 MM HG: CPT | Mod: CPTII,S$GLB,, | Performed by: PHYSICIAN ASSISTANT

## 2024-01-04 PROCEDURE — 3075F SYST BP GE 130 - 139MM HG: CPT | Mod: CPTII,S$GLB,, | Performed by: PHYSICIAN ASSISTANT

## 2024-01-04 PROCEDURE — 3008F BODY MASS INDEX DOCD: CPT | Mod: CPTII,S$GLB,, | Performed by: PHYSICIAN ASSISTANT

## 2024-01-04 PROCEDURE — 99999 PR PBB SHADOW E&M-EST. PATIENT-LVL IV: CPT | Mod: PBBFAC,,, | Performed by: PHYSICIAN ASSISTANT

## 2024-01-04 RX ORDER — TIZANIDINE 4 MG/1
4 TABLET ORAL NIGHTLY PRN
Qty: 20 TABLET | Refills: 0 | Status: SHIPPED | OUTPATIENT
Start: 2024-01-04 | End: 2024-01-25 | Stop reason: SDUPTHER

## 2024-01-04 RX ORDER — CETIRIZINE HYDROCHLORIDE 10 MG/1
10 TABLET ORAL
COMMUNITY
Start: 2023-10-06

## 2024-01-04 RX ORDER — FLUTICASONE PROPIONATE 50 MCG
2 SPRAY, SUSPENSION (ML) NASAL
COMMUNITY
Start: 2023-10-06

## 2024-01-04 RX ORDER — IBUPROFEN 800 MG/1
800 TABLET ORAL 3 TIMES DAILY PRN
Qty: 30 TABLET | Refills: 0 | Status: SHIPPED | OUTPATIENT
Start: 2024-01-04

## 2024-01-04 RX ORDER — OLOPATADINE HYDROCHLORIDE 1 MG/ML
SOLUTION/ DROPS OPHTHALMIC
COMMUNITY
Start: 2023-10-06

## 2024-01-04 RX ORDER — BENZONATATE 100 MG/1
100 CAPSULE ORAL
COMMUNITY
Start: 2023-10-06

## 2024-01-04 NOTE — PROGRESS NOTES
Health Maintenance Due   Topic     Influenza Vaccine (1) Pt decline    COVID-19 Vaccine (3 - 2023-24 season) Not offered at this Facility

## 2024-01-04 NOTE — PROGRESS NOTES
Subjective     Patient ID: Rodríguez Graves is a 38 y.o. male.    Chief Complaint: Flank Pain    HPI: 39 yo male presents for right lower back pain. Started 2 months ago. Everyday, waxes and wanes. Sometimes burning sensation in the glute. May radiate to the thigh. No leg numbness or tingling. Took tylenol with no relief. No injuries, states in 2009 he pulled a muscle in his back. Has noticed bubbles in urine.      Review of Systems   Constitutional:  Negative for chills, diaphoresis, fatigue and fever.   Respiratory:  Negative for shortness of breath.    Gastrointestinal:  Negative for fecal incontinence.   Genitourinary:  Positive for frequency (chronic). Negative for bladder incontinence, dysuria and flank pain.   Musculoskeletal:  Positive for back pain.          Objective     Physical Exam  Constitutional:       Appearance: Normal appearance.   HENT:      Head: Normocephalic and atraumatic.   Musculoskeletal:        Legs:    Neurological:      Mental Status: He is alert.   Psychiatric:         Mood and Affect: Mood normal.         Thought Content: Thought content normal.            Assessment and Plan     1. Acute right-sided low back pain with right-sided sciatica  -     POCT URINE DIPSTICK WITHOUT MICROSCOPE  -     ibuprofen (ADVIL,MOTRIN) 800 MG tablet; Take 1 tablet (800 mg total) by mouth 3 (three) times daily as needed for Pain.  Dispense: 30 tablet; Refill: 0  -     tiZANidine (ZANAFLEX) 4 MG tablet; Take 1 tablet (4 mg total) by mouth nightly as needed (muscle relaxer).  Dispense: 20 tablet; Refill: 0  -     Urinalysis  -     stretching exercises given, f/u if no relief.                 No follow-ups on file.

## 2024-01-09 ENCOUNTER — PATIENT MESSAGE (OUTPATIENT)
Dept: FAMILY MEDICINE | Facility: CLINIC | Age: 39
End: 2024-01-09
Payer: COMMERCIAL

## 2024-01-09 DIAGNOSIS — M54.41 ACUTE RIGHT-SIDED LOW BACK PAIN WITH RIGHT-SIDED SCIATICA: Primary | ICD-10-CM

## 2024-01-09 RX ORDER — GABAPENTIN 100 MG/1
100 CAPSULE ORAL 3 TIMES DAILY
Qty: 60 CAPSULE | Refills: 0 | Status: SHIPPED | OUTPATIENT
Start: 2024-01-09 | End: 2024-01-30

## 2024-01-10 ENCOUNTER — CLINICAL SUPPORT (OUTPATIENT)
Dept: REHABILITATION | Facility: OTHER | Age: 39
End: 2024-01-10
Payer: COMMERCIAL

## 2024-01-10 DIAGNOSIS — M54.41 ACUTE RIGHT-SIDED LOW BACK PAIN WITH RIGHT-SIDED SCIATICA: ICD-10-CM

## 2024-01-10 DIAGNOSIS — M54.17 LUMBOSACRAL RADICULITIS: Primary | ICD-10-CM

## 2024-01-10 PROCEDURE — 97530 THERAPEUTIC ACTIVITIES: CPT | Mod: PN

## 2024-01-10 PROCEDURE — 97162 PT EVAL MOD COMPLEX 30 MIN: CPT | Mod: PN

## 2024-01-12 PROBLEM — M54.17 LUMBOSACRAL RADICULITIS: Status: ACTIVE | Noted: 2024-01-12

## 2024-01-12 NOTE — PLAN OF CARE
[facility]   Physical Therapy Initial Evaluation      Name: Rodríguez Graves  Clinic Number: [id number]    Therapy Diagnosis:   Encounter Diagnoses   Name Primary?    Acute right-sided low back pain with right-sided sciatica     Lumbosacral radiculitis Yes        Physician: Sudha Acosta, DANGELO    Physician Orders: PT Eval and Treat   Medical Diagnosis from Referral: M54.41 (ICD-10-CM) - Acute right-sided low back pain with right-sided sciatica  Evaluation Date: 1/10/2024  Authorization Period Expiration: 12/31/2024  Plan of Care Expiration: 4/10/2024  Progress Note Due: 2/10/2024  Date of Surgery: n/a  Visit # / Visits authorized: 1/ 1   FOTO: 1/ 3    Precautions: Standard     Time In: 830  Time Out: 915  Total Billable Time: 45 minutes    Subjective     Date of onset: 2 months ago    History of current condition - Rodríguez reports: insidious onset of RIGHT low back pain that started 2 months ago without PAUL or trauma. Says pain is on the Right low back [indicates over SIJ] and feels like a burning sensation that worsens with movement. Despite his pain he is still able to stand, walk, lift, and climb stairs at work. Denies radicular pain down the leg. No change with OTC meds.    Pt denies drop foot, change of B/B control, saddle paresthesias, unexplained weight gain/loss, fever, chills or night sweats. PMHx of a pulled muscle in the low back in 2009.    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  Prior Level of Function: indep  Current Level of Function: pain and difficulty with all functional activities    Pain:  Current 3/10, worst 6/10, best 3/10   Location: right low back  Description: Burning  Aggravating Factors: Getting out of bed/chair and sleeping (moving in bed), bending, lifting, twisting, standing, walking,  "squatting, sitting, driving, working, stair climbing  Easing Factors: heating pad and change position, rest, mm relaxor, pain medication, OTC medication - says the medications "don't touch it"    Patients goals: resolve pain     Medical History:   Past Medical History:   Diagnosis Date    Anxiety     Depression     Essential hypertension 11/11/2021    Infertility male        Surgical History:   Rodríguez Graves  has a past surgical history that includes [place] tooth extraction and Urecthrotomy.    Medications:   Rodríguez has a current medication list which includes the following prescription(s): amlodipine, benzonatate, binaxnow covid-19 ag self test, cabergoline, cetirizine, fluticasone propionate, gabapentin, hydrochlorothiazide, hyoscyamine, ibuprofen, olopatadine, and tizanidine.    Allergies:   Review of patient's allergies indicates:  No Known Allergies     Objective      Postural examination/scapula alignment: anterior innominate rotation (R), APT, (+) body habitus  Sensory deficit: none  Reflexes: intact    Palpation: TTP over R SIJ, increased mm tone/TTP to R lumbar paraspinals      Thoracic/Lumbar AROM: Pain/Dysfunction with Movement:   Flexion Full, fingertips to toes, increased tension over R LB but no burning pain   Extension WNL, no pain   Right side bending Mod deluna, burning pain over R low back   Left side bending Mod-max deluna, sharp/burning pain over R low back   Right rotation Min deluna, slight pain over R low back   Left rotation WNL, no pain     Hip ROM: mod deluna R hip IR/ER (WNL all other directions)  Knee ROM: 0-130 B    Lower Extremity Strength  Right LE  Left LE    Hip flexion: 4+/5 Hip flexion: 4+/5   Hip extension:  4+/5 - knee ext  4/5 - knee flex Hip extension: 5/5 - knee ext  4+/5 - knee flex   Hip abduction: 4/5 Hip abduction: 4+/5   Hip adduction:  5/5 Hip adduction:  5/5   Hip Internal rotation   5/5 Hip Internal rotation 4+/5   Knee Flexion 5/5 Knee Flexion 5/5   Knee Extension 5/5 Knee " "Extension 5/5   Ankle dorsiflexion: 5/5 Ankle dorsiflexion: 5/5   Ankle plantarflexion: 5/5 Ankle plantarflexion: 5/5     Flexibility:   Leonard test: Hip flexors: WNL  Ely's: Quads: WNL  Mikala test: ITB: WNL  Hamstring (SLR): 90 deg brandan    Joint Mobility: mod hypo L2-S1 RR<LR glides with tight mm EF    Special Tests:   Test Name  Test Result   Prone Instability Test (--)   Lumbar Quadrant test (--)   Straight Leg Raise (--)   Neural Tension Test (Slump) (--)   Crossed Straight Leg Raise (--)   Walking on toes (--)   Walking on heels  (--)   Clonus (--)   TOMI (--)   FADIR (--)   SI Joint Provocation Test (compression / distraction/ posterior glide) (--)     Functional Movement Analysis:   Gait: I  Bed mobility: I  Sit<>stand: I  DL squat: good hip/knee ROM, trunk upright, wide stance with toe out, no LBP  Stairs: I, pain with R step up, no pain on descent    Balance: SLS x 10" brandan, min-mod sway on LLE with contralat pelvic drop, no pain on L but slight pain on R      Intake Outcome Measure for FOTO lumbar spine Survey    Therapist reviewed FOTO scores for Rodríguez Graves on 1/10/2024.   FOTO report - see Media section or FOTO account episode details.             Treatment     Total Treatment time (time-based codes) separate from Evaluation: 33 minutes     Rodríguez received the treatments listed below:      therapeutic exercises to develop strength, endurance, ROM, flexibility, posture, and core stabilization for 5 minutes including:  Self MET to correct R anterior innominate rotaton 10 x 5" - with dowel    manual therapy techniques: Joint mobilizations, Myofacial release, and Soft tissue Mobilization were applied to the: LSP for 5 minutes, including:  Shot gun technique, MET for anterior innominate rotation (R)    neuromuscular re-education activities to improve: Balance, Coordination, Kinesthetic, Sense, Proprioception, and Posture for 00 minutes. The following activities were included:  None today  Plan to initiate TA " "activation and core stabilization next visit    therapeutic activities to improve functional performance for 23  minutes, including:  Pt edu on dx, pathogenesis, prognosis, PT POC - see below  Edu on importance of standing posture, including use of cushioned mat at work, and stretching frequently throughout the day to reduce of prevent sx occurrence  Pt edu on HEP and frequency, duration, and technique. Pt demo's good return technique:  Figure 4 IR/ER hip stretch 3 x 30" ea  Seated figure 4 stretch 3 x 30"  PPT 10 x 10"  Glute set into bridge 10 x10"  Clam 10 x10"  Reverse clam 10 x10"          Patient Education and Home Exercises     Education provided:   - Patient educated regarding pathogenesis, diagnosis, protocol, prognosis, POC, and HEP, including use of visual assistance for understanding of anatomy and dysfunction. Written Home Exercises Provided with written and verbal instructions for frequency and duration of the following exercises: see list above. Pt educated on HEP and activity modifications to reduce c/o pain and improve overall function.   - Pt was educated in posture and body mechanics.  Use of a lumbar roll was recommended and demonstrated here today.  Purchase information provided.   - Pt also educated on use of modalities prn to reduce c/o pain and dysfunction.   - Pt educated on clinic's cancellation/no-show policy of missing 3 consecutive PT appointments, which will result in an automatic discharge from therapy services 2* to non-compliance, unless otherwise stated.   - Patient demo good understanding of the education provided. Patient demo good return demo of skill of exercises.      Written Home Exercises Provided: yes. Exercises were reviewed and Rodríguez was able to demonstrate them prior to the end of the session.  Rodríguez demonstrated good  understanding of the education provided. See EMR under Patient Instructions for exercises provided during therapy sessions.    Assessment     Rodríguez is a 38 " y.o. male referred to outpatient Physical Therapy with a medical diagnosis of M54.41 (ICD-10-CM) - Acute right-sided low back pain with right-sided sciatica. Patient presents with marked limitations in ROM, joint and myofascial mobility, flexibility, strength, postural awareness/endurance, motor control and coordination. S/s associated with referring diagnosis, with s/s of lumbosacral radiculitis with possible lumbopelvic instability due to weakness. Impairments limit pt with all functional activities including work activities including standing, walking, stair climbing prolonged standing.      Patient prognosis is Good.   Patient will benefit from skilled outpatient Physical Therapy to address the deficits stated above and in the chart below, provide patient /family education, and to maximize patientt's level of independence.     Plan of care discussed with patient: Yes  Patient's spiritual, cultural and educational needs considered and patient is agreeable to the plan of care and goals as stated below:     Anticipated Barriers for therapy: standard, obesity, occupation    Medical Necessity is demonstrated by the following  History  Co-morbidities and personal factors that may impact the plan of care [] LOW: no personal factors / co-morbidities  [] MODERATE: 1-2 personal factors / co-morbidities  [x] HIGH: 3+ personal factors / co-morbidities    Moderate / High Support Documentation:   Co-morbidities affecting plan of care: obesity    Personal Factors:   social background  lifestyle     Examination  Body Structures and Functions, activity limitations and participation restrictions that may impact the plan of care [] LOW: addressing 1-2 elements  [] MODERATE: 3+ elements  [x] HIGH: 4+ elements (please support below)    Moderate / High Support Documentation: ROM, joint and myofasical mobility, flexibility, strength, endurance, motor control/coordination, postural endurance, standing, walking, stair climbing,  squatting, work, HHCs, getting in/out of car/bed     Clinical Presentation [] LOW: stable  [x] MODERATE: Evolving  [] HIGH: Unstable     Decision Making/ Complexity Score: moderate       Goals:  Short Term Goals (4 Weeks):   1. Pt will report 20% reduction in pain of the lumbar spine and LE for ease with ADL's  2. PT will demonstrate improved trunk strength by a half grade in for ease with upright posture during standing activities.  3. Pt will demonstrate improved lumbar spine ROM in all directions by a half grade for ease with bending activities.     Long Term Goals (12 Weeks):   1. Pt will report being independent with HEP for maintenance of improvements gained during therapy sessions  2. PT will report 50% reduction of pain of the back and LE for ease with dressing and grooming activities.   3. Pt will demonstrate trunk and extremity strength to >=4+/5 without the provocation of pain for ease with household chores  4. Pt will demonstrate appropriate upright posture without external cueing for ease with work related activities.   5. Pt to demonstrate improved functional ability with FOTO-Modified Oswestry score <=20/100.    Plan     Plan of care Certification: 1/10/2024 to 4/10/2024.    Outpatient Physical Therapy 2 times weekly for 12 weeks to include the following interventions: Aquatic Therapy, Cervical/Lumbar Traction, Electrical Stimulation prn, Gait Training, Iontophoresis (with dexamethasone prn), Manual Therapy, Moist Heat/ Ice, Neuromuscular Re-ed, Patient Education, Self Care, Therapeutic Activities, and Therapeutic Exercise. Progress HEP towards D/C. Recommend F/U with MD if symptoms worsen or do not resolve. Patient may be seen by a PTA for treatment to carry out their plan of care.  Face-to-face conferences will be held.      Estefany Strauss, PT        Physician's Signature: _________________________________________ Date: ________________

## 2024-01-18 ENCOUNTER — CLINICAL SUPPORT (OUTPATIENT)
Dept: REHABILITATION | Facility: OTHER | Age: 39
End: 2024-01-18
Payer: COMMERCIAL

## 2024-01-18 DIAGNOSIS — M54.17 LUMBOSACRAL RADICULITIS: Primary | ICD-10-CM

## 2024-01-18 PROCEDURE — 97530 THERAPEUTIC ACTIVITIES: CPT | Mod: PN,CQ

## 2024-01-18 PROCEDURE — 97112 NEUROMUSCULAR REEDUCATION: CPT | Mod: PN,CQ

## 2024-01-18 NOTE — PROGRESS NOTES
"[facility]   Physical Therapy Treatment Note      Name: Rodríguez Graves  Clinic Number: [id number]    Therapy Diagnosis: No diagnosis found.  Physician: Sudha Acosta PA-C    Visit Date: 1/18/2024    Physician: Sudha Acosta PA-C     Physician Orders: PT Eval and Treat   Medical Diagnosis from Referral: M54.41 (ICD-10-CM) - Acute right-sided low back pain with right-sided sciatica  Evaluation Date: 1/10/2024  Authorization Period Expiration: 12/31/2024  Plan of Care Expiration: 4/10/2024  Progress Note Due: 2/10/2024  Date of Surgery: n/a  Visit # / Visits authorized: 2/21   FOTO: 1/ 3     Precautions: Standard      Time In: 9:03 am   Time Out: 9:51 am   Total Billable Time: 48 minutes     PTA Visit #: 1/5       Subjective     Patient reports: he has been doing the stretches Estefany gave him. Pt states the other day at work, it was really bother him with every step he took. Pt states he works as a  at the [employer], so he is on his feet all day.     He was compliant with home exercise program.  Response to previous treatment:   Functional change: none yet, ongoing     Pain: 3/10 "pain and burning sensation"   Location: right low back     Objective      Objective Measures updated at progress report unless specified.     Treatment     Rodríguez received the treatments listed below:      therapeutic exercises to develop strength, endurance, ROM, flexibility, posture, and core stabilization for 00 minutes including:  None today.     manual therapy techniques: Joint mobilizations, Myofacial release, Soft tissue Mobilization, and Friction Massage were applied to the:  for 00 minutes, including:  None today.     neuromuscular re-education activities to improve: Balance, Coordination, Kinesthetic, Sense, Proprioception, and Posture for 23 minutes. The following activities were included:  PPT + TA contraction 10 x 10"   PPT + TA contraction + Glute set into bridge 10 x10"   Clam 10 x10" B   Reverse " "clam 10 x10" B     therapeutic activities to improve functional performance for 25  minutes, including:  Plan to initiate TA activation and core stabilization next visit --long leg bridge on PB, TA progression, cook band pull downs with SLR and bridge, standing rows/lat pulls (freemotion?), anti rotations, shuttle   Reviewed and educated pt on HEP   Figure 4 IR/ER hip stretch 3 x 30" ea   Seated figure 4 stretch 3 x 30"   Free motion SALPD (long lat bar) + TA contraction 2 x 10 13#   Free motion rows + TA contraction 3 x 10 13#   Pallof press vs yellow power band x 20 B, green power band x 20 B   Educated on TrA anatomy and physiology.     Patient Education and Home Exercises       Education provided:   - Added RTB to clamshells and YTB to reverse clamshells to HEP     Written Home Exercises Provided: Patient instructed to cont prior HEP. Exercises were reviewed and Rodríguez was able to demonstrate them prior to the end of the session.  Rodríguez demonstrated good  understanding of the education provided. See Electronic Medical Record under Patient Instructions for exercises provided during therapy sessions    Assessment     Rodríguez had good tolerance to initial treatment today with no adverse effects. Required minimal cues to decrease hip flexor substitutions during SL clamshells and for TA contraction during free motion and pallof press exercises. Pt was able to demonstrate with adequate mm fatigue in core post session. Continue to monitor and progress pt as tolerated.     Rodríguez Is progressing well towards his goals.   Patient prognosis is Good.     Patient will continue to benefit from skilled outpatient physical therapy to address the deficits listed in the problem list box on initial evaluation, provide pt/family education and to maximize pt's level of independence in the home and community environment.     Patient's spiritual, cultural and educational needs considered and pt agreeable to plan of care and goals.   "   Anticipated barriers to physical therapy: standard, obesity, occupation     Goals: updated 01/18/2024  Short Term Goals (4 Weeks):   1. Pt will report 20% reduction in pain of the lumbar spine and LE for ease with ADL's (Progressing, not met)  2. PT will demonstrate improved trunk strength by a half grade in for ease with upright posture during standing activities.(Progressing, not met)  3. Pt will demonstrate improved lumbar spine ROM in all directions by a half grade for ease with bending activities. (Progressing, not met)     Long Term Goals (12 Weeks):   1. Pt will report being independent with HEP for maintenance of improvements gained during therapy sessions(Progressing, not met)  2. PT will report 50% reduction of pain of the back and LE for ease with dressing and grooming activities. (Progressing, not met)  3. Pt will demonstrate trunk and extremity strength to >=4+/5 without the provocation of pain for ease with household chores(Progressing, not met)  4. Pt will demonstrate appropriate upright posture without external cueing for ease with work related activities. (Progressing, not met)  5. Pt to demonstrate improved functional ability with FOTO-Modified Oswestry score <=20/100.(Progressing, not met)    Plan     Plan of care Certification: 1/10/2024 to 4/10/2024.     Continue pt's current POC to improve lumbar pain, ROM, and core strength.     Sidra Talavera, PTA

## 2024-01-22 ENCOUNTER — CLINICAL SUPPORT (OUTPATIENT)
Dept: REHABILITATION | Facility: OTHER | Age: 39
End: 2024-01-22
Payer: COMMERCIAL

## 2024-01-22 DIAGNOSIS — M54.41 ACUTE RIGHT-SIDED LOW BACK PAIN WITH RIGHT-SIDED SCIATICA: ICD-10-CM

## 2024-01-22 DIAGNOSIS — M54.17 LUMBOSACRAL RADICULITIS: Primary | ICD-10-CM

## 2024-01-22 PROCEDURE — 97530 THERAPEUTIC ACTIVITIES: CPT | Mod: PN

## 2024-01-22 PROCEDURE — 97112 NEUROMUSCULAR REEDUCATION: CPT | Mod: PN

## 2024-01-22 NOTE — PROGRESS NOTES
"[facility]   Physical Therapy Treatment Note      Name: Rodríguez Graves  Clinic Number: [id number]    Therapy Diagnosis:   Encounter Diagnosis   Name Primary?    Lumbosacral radiculitis Yes     Physician: Sudha Acosta PA-C    Visit Date: 1/22/2024    Physician: Sudha Acosta PA-C     Physician Orders: PT Eval and Treat   Medical Diagnosis from Referral: M54.41 (ICD-10-CM) - Acute right-sided low back pain with right-sided sciatica  Evaluation Date: 1/10/2024  Authorization Period Expiration: 12/31/2024  Plan of Care Expiration: 4/10/2024  Progress Note Due: 2/10/2024  Date of Surgery: n/a  Visit # / Visits authorized: 2/21   FOTO: 1/ 3     Precautions: Standard      Time In: 1:45 pm   Time Out: 2:30 pm   Total Billable Time: 45 minutes     PTA Visit #: 1/5       Subjective     Patient reports: min improvement in sx since last visit.    He was compliant with home exercise program.  Response to previous treatment:   Functional change: none yet, ongoing     Pain: 3/10 "pain and burning sensation"   Location: right low back     Objective      Objective Measures updated at progress report unless specified.     Treatment     Rodríguez received the treatments listed below:      therapeutic exercises to develop strength, endurance, ROM, flexibility, posture, and core stabilization for 00 minutes including:  None today.     manual therapy techniques: Joint mobilizations, Myofacial release, Soft tissue Mobilization, and Friction Massage were applied to the:  for 00 minutes, including:  None today.     neuromuscular re-education activities to improve: Balance, Coordination, Kinesthetic, Sense, Proprioception, and Posture for 20 minutes. The following activities were included:  PPT + TA contraction 10 x 10"   PPT + TA contraction + Glute set into bridge 10 x10" x GTB at knees  Clam 10 x10" B  x GTB at knees  Reverse clam 10 x10" B  x RTB at ankles    therapeutic activities to improve functional performance " "for 25  minutes, including:  Plan to initiate TA activation and core stabilization next visit --long leg bridge on PB, TA progression, cook band pull downs with SLR and bridge, standing rows/lat pulls (freemotion?), anti rotations, shuttle     Figure 4 IR/ER hip stretch 3 x 30" ea     Free motion SALPD (long lat bar) + TA contraction 2 x 10 13#   Free motion rows + TA contraction 3 x 10 13#   Pallof press vs yellow power band x 20 B, green power band x 20 B     Modalities: Rodríguez received training and instruction, as well as, monitoring of Iontophoresis patch with a 1.3 mL of Dexamethsone at a rate of 80 mA/min to the R SIJ. Safety and patient removal discussed. X 2'    Patient Education and Home Exercises       Education provided:   - Added RTB to clamshells and YTB to reverse clamshells to HEP     Written Home Exercises Provided: Patient instructed to cont prior HEP. Exercises were reviewed and Rodríguez was able to demonstrate them prior to the end of the session.  Rodríguez demonstrated good  understanding of the education provided. See Electronic Medical Record under Patient Instructions for exercises provided during therapy sessions    Assessment     Progressed functional strengthening today with appropriate training effect noted. Ionto pat placed over R SIJ today as pt notes continued pain without relief from therex. Edu on benefits, don/doff schedule; pt verbalized understanding.  Rodríguez Is progressing well towards his goals.   Patient prognosis is Good.     Patient will continue to benefit from skilled outpatient physical therapy to address the deficits listed in the problem list box on initial evaluation, provide pt/family education and to maximize pt's level of independence in the home and community environment.     Patient's spiritual, cultural and educational needs considered and pt agreeable to plan of care and goals.     Anticipated barriers to physical therapy: standard, obesity, occupation     Goals: updated " 01/25/2024  Short Term Goals (4 Weeks):   1. Pt will report 20% reduction in pain of the lumbar spine and LE for ease with ADL's (Progressing, not met)  2. PT will demonstrate improved trunk strength by a half grade in for ease with upright posture during standing activities.(Progressing, not met)  3. Pt will demonstrate improved lumbar spine ROM in all directions by a half grade for ease with bending activities. (Progressing, not met)     Long Term Goals (12 Weeks):   1. Pt will report being independent with HEP for maintenance of improvements gained during therapy sessions(Progressing, not met)  2. PT will report 50% reduction of pain of the back and LE for ease with dressing and grooming activities. (Progressing, not met)  3. Pt will demonstrate trunk and extremity strength to >=4+/5 without the provocation of pain for ease with household chores(Progressing, not met)  4. Pt will demonstrate appropriate upright posture without external cueing for ease with work related activities. (Progressing, not met)  5. Pt to demonstrate improved functional ability with FOTO-Modified Oswestry score <=20/100.(Progressing, not met)    Plan     Plan of care Certification: 1/10/2024 to 4/10/2024.     Continue pt's current POC to improve lumbar pain, ROM, and core strength.     Estefany Strauss, PT

## 2024-01-25 ENCOUNTER — CLINICAL SUPPORT (OUTPATIENT)
Dept: REHABILITATION | Facility: OTHER | Age: 39
End: 2024-01-25
Payer: COMMERCIAL

## 2024-01-25 DIAGNOSIS — M54.17 LUMBOSACRAL RADICULITIS: Primary | ICD-10-CM

## 2024-01-25 PROCEDURE — 97530 THERAPEUTIC ACTIVITIES: CPT | Mod: PN,CQ

## 2024-01-25 PROCEDURE — 97112 NEUROMUSCULAR REEDUCATION: CPT | Mod: PN,CQ

## 2024-01-25 RX ORDER — TIZANIDINE 4 MG/1
TABLET ORAL
Qty: 20 TABLET | Refills: 0 | Status: SHIPPED | OUTPATIENT
Start: 2024-01-25 | End: 2024-02-14

## 2024-01-25 NOTE — PROGRESS NOTES
"[facility]   Physical Therapy Treatment Note      Name: Rodríguez Graves  Clinic Number: [id number]    Therapy Diagnosis:   Encounter Diagnosis   Name Primary?    Lumbosacral radiculitis Yes     Physician: Sudha Acosta PA-C    Visit Date: 1/25/2024    Physician: Sudha Acosta PA-C     Physician Orders: PT Eval and Treat   Medical Diagnosis from Referral: M54.41 (ICD-10-CM) - Acute right-sided low back pain with right-sided sciatica  Evaluation Date: 1/10/2024  Authorization Period Expiration: 12/31/2024  Plan of Care Expiration: 4/10/2024  Progress Note Due: 2/10/2024  Date of Surgery: n/a  Visit # / Visits authorized: 4/21   FOTO: 1/ 3     Precautions: Standard      Time In: 10:39 am   Time Out: 11:15 am   Total Billable Time: 36 minutes     PTA Visit #: 1/5       Subjective     Patient reports: he is feeling the same. "The more I move the more it bothers me. Mostly have problems when I am going through my work shifts."    He was somewhat compliant with home exercise program.  Response to previous treatment:   Functional change: none yet, ongoing     Pain: 3/10 "pain and burning sensation"   Location: right low back     Objective      Objective Measures updated at progress report unless specified.     Treatment     Rodríguez received the treatments listed below:      therapeutic exercises to develop strength, endurance, ROM, flexibility, posture, and core stabilization for 00 minutes including:  None today.     manual therapy techniques: Joint mobilizations, Myofacial release, Soft tissue Mobilization, and Friction Massage were applied to the:  for 00 minutes, including:  None today.     neuromuscular re-education activities to improve: Balance, Coordination, Kinesthetic, Sense, Proprioception, and Posture for 11 minutes. The following activities were included:  PPT + TA contraction 10 x 10"   Pallof press vs blue power band x 10 B   PPT + TA contraction + Glute set into bridge 10 x10" x GTB at " "knees  Clam 10 x10" B  x GTB at knees  Reverse clam 10 x10" B  x RTB at ankles     therapeutic activities to improve functional performance for 23  minutes, including:  Plan to initiate TA activation and core stabilization next visit --long leg bridge on PB, TA progression, cook band pull downs with SLR and bridge, standing rows/lat pulls (freemotion?), anti rotations, shuttle     Figure 4 IR/ER hip stretch 3 x 30" ea     Free motion SALPD (long lat bar) + TA contraction 2 x 10 13# ( Increase weight nv)   Free motion rows + TA contraction 3 x 10 20#       For 2 minutes: Rodríguez received training and instruction, as well as, monitoring of Iontophoresis patch with a 1 mL of Dexamethsone with 14 hour wear time to the low back. Safety and patient removal discussed.    Patient Education and Home Exercises       Education provided:   - Added RTB to clamshells and YTB to reverse clamshells to HEP   + Added standing hip 3-way to HEP     Written Home Exercises Provided: Patient instructed to cont prior HEP. Exercises were reviewed and Rodríguez was able to demonstrate them prior to the end of the session.  Rodríguez demonstrated good  understanding of the education provided. See Electronic Medical Record under Patient Instructions for exercises provided during therapy sessions    Assessment     Rodríguez tolerated progressions well today with no adverse effects. Required minimal verbal cues to engage TrA during free motion exercises, able to demonstrate understanding. Pt noted adequate fatigue post session in periscapular mm and core. Continued ionto patch treatment due to pt's noted relief from last visit. Added standing hip 3-way to HEP for ease of performing when standing at work. Continue to monitor and progress pt as tolerated.     Rodríguez Is progressing well towards his goals.   Patient prognosis is Good.     Patient will continue to benefit from skilled outpatient physical therapy to address the deficits listed in the problem list " box on initial evaluation, provide pt/family education and to maximize pt's level of independence in the home and community environment.     Patient's spiritual, cultural and educational needs considered and pt agreeable to plan of care and goals.     Anticipated barriers to physical therapy: standard, obesity, occupation     Goals: updated 01/25/2024  Short Term Goals (4 Weeks):   1. Pt will report 20% reduction in pain of the lumbar spine and LE for ease with ADL's (Progressing, not met)  2. PT will demonstrate improved trunk strength by a half grade in for ease with upright posture during standing activities.(Progressing, not met)  3. Pt will demonstrate improved lumbar spine ROM in all directions by a half grade for ease with bending activities. (Progressing, not met)     Long Term Goals (12 Weeks):   1. Pt will report being independent with HEP for maintenance of improvements gained during therapy sessions(Progressing, not met)  2. PT will report 50% reduction of pain of the back and LE for ease with dressing and grooming activities. (Progressing, not met)  3. Pt will demonstrate trunk and extremity strength to >=4+/5 without the provocation of pain for ease with household chores(Progressing, not met)  4. Pt will demonstrate appropriate upright posture without external cueing for ease with work related activities. (Progressing, not met)  5. Pt to demonstrate improved functional ability with FOTO-Modified Oswestry score <=20/100.(Progressing, not met)    Plan     Plan of care Certification: 1/10/2024 to 4/10/2024.     Continue pt's current POC to improve lumbar pain, ROM, and core strength.     Sidra Talavera, PTA

## 2024-01-26 DIAGNOSIS — M54.41 ACUTE RIGHT-SIDED LOW BACK PAIN WITH RIGHT-SIDED SCIATICA: ICD-10-CM

## 2024-01-29 ENCOUNTER — DOCUMENTATION ONLY (OUTPATIENT)
Dept: REHABILITATION | Facility: OTHER | Age: 39
End: 2024-01-29
Payer: COMMERCIAL

## 2024-01-29 DIAGNOSIS — M54.17 LUMBOSACRAL RADICULITIS: Primary | ICD-10-CM

## 2024-01-29 NOTE — PROGRESS NOTES
Physical Therapist and Physical Therapist Assistant met face to face to discuss patient's treatment plan and progress towards established goals. Pt will be seen by a physical therapist minimally every 6th visit or every 30 days.    Sidra Talavera, DANTE  01/29/2024

## 2024-01-29 NOTE — PROGRESS NOTES
"[facility]   Physical Therapy Treatment Note      Name: Rodríguez Graves  Clinic Number: [id number]    Therapy Diagnosis:   Encounter Diagnosis   Name Primary?    Lumbosacral radiculitis Yes     Physician: Sudha Acosta PA-C    Visit Date: 1/30/2024    Physician: Sudha Acosta PA-C     Physician Orders: PT Eval and Treat   Medical Diagnosis from Referral: M54.41 (ICD-10-CM) - Acute right-sided low back pain with right-sided sciatica  Evaluation Date: 1/10/2024  Authorization Period Expiration: 12/31/2024  Plan of Care Expiration: 4/10/2024  Progress Note Due: 2/10/2024  Date of Surgery: n/a  Visit # / Visits authorized: 5/21   FOTO: 1/ 3     Precautions: Standard      Time In: 950  Time Out: 1030  Total Billable Time: 40 minutes     PTA Visit #: 0/5       Subjective     Patient reports: no changes in pain and symptoms. He says he has performed some of his HEP but is inconsistent with it. No real change with ionto patch last visit.     He was somewhat compliant with home exercise program.  Response to previous treatment:   Functional change: none yet, ongoing     Pain: 3/10 "pain and burning sensation"   Location: right low back     Objective      Objective Measures updated at progress report unless specified.     Treatment     Rodríguez received the treatments listed below:      therapeutic exercises to develop strength, endurance, ROM, flexibility, posture, and core stabilization for 00 minutes including:  None today.     manual therapy techniques: Joint mobilizations, Myofacial release, Soft tissue Mobilization, and Friction Massage were applied to the:  for 00 minutes, including:  None today.     neuromuscular re-education activities to improve: Balance, Coordination, Kinesthetic, Sense, Proprioception, and Posture for 25 minutes. The following activities were included:  PPT + TA contraction 10 x 10"   Pallof press vs blue power band x 10 B   PPT + TA contraction + Glute set into bridge 10 x10" " "x GTB at knees  Clam 10 x10" B  x GTB at knees  Reverse clam 10 x10" B  x RTB at ankles   +Cook band pull downs with Bridge 2 x 10  +Cook band pull downs with SLR flexion 2 x 10    therapeutic activities to improve functional performance for 15  minutes, including:  Plan to initiate TA activation and core stabilization next visit --long leg bridge on PB, TA progression, anti rotations, shuttle, chop and lifts       Figure 4 IR/ER hip stretch 3 x 30" ea     Free motion SALPD (long lat bar) + March 2 x 10 13# ( Increase weight nv)   Free motion rows + TA contraction 3 x 10 20#     For 2 minutes: Rodríguez received training and instruction, as well as, monitoring of Iontophoresis patch with a 1 mL of Dexamethsone with 14 hour wear time to the low back. Safety and patient removal discussed.    Patient Education and Home Exercises       Education provided:   - Added RTB to clamshells and YTB to reverse clamshells to HEP   + Added standing hip 3-way to HEP     Written Home Exercises Provided: Patient instructed to cont prior HEP. Exercises were reviewed and Rodríguez was able to demonstrate them prior to the end of the session.  Rodríguez demonstrated good  understanding of the education provided. See Electronic Medical Record under Patient Instructions for exercises provided during therapy sessions    Assessment     Rodríguez tolerated progression of core stabilization based exercises focused on coordination of UE and LE well today. He demonstrates good activation of TrA with supine exercises so continuing to progress to more functional positions. Verbal cues used to limit trunk rotation with paloff press. Lat pulls progressed to marching to increase activation of trunk stabilizers in standing.     Rodríguez Is progressing well towards his goals.   Patient prognosis is Good.     Patient will continue to benefit from skilled outpatient physical therapy to address the deficits listed in the problem list box on initial evaluation, provide " pt/family education and to maximize pt's level of independence in the home and community environment.     Patient's spiritual, cultural and educational needs considered and pt agreeable to plan of care and goals.     Anticipated barriers to physical therapy: standard, obesity, occupation     Goals: updated 01/29/2024  Short Term Goals (4 Weeks):   1. Pt will report 20% reduction in pain of the lumbar spine and LE for ease with ADL's (Progressing, not met)  2. PT will demonstrate improved trunk strength by a half grade in for ease with upright posture during standing activities.(Progressing, not met)  3. Pt will demonstrate improved lumbar spine ROM in all directions by a half grade for ease with bending activities. (Progressing, not met)     Long Term Goals (12 Weeks):   1. Pt will report being independent with HEP for maintenance of improvements gained during therapy sessions(Progressing, not met)  2. PT will report 50% reduction of pain of the back and LE for ease with dressing and grooming activities. (Progressing, not met)  3. Pt will demonstrate trunk and extremity strength to >=4+/5 without the provocation of pain for ease with household chores(Progressing, not met)  4. Pt will demonstrate appropriate upright posture without external cueing for ease with work related activities. (Progressing, not met)  5. Pt to demonstrate improved functional ability with FOTO-Modified Oswestry score <=20/100.(Progressing, not met)    Plan     Plan of care Certification: 1/10/2024 to 4/10/2024.     Continue pt's current POC to improve lumbar pain, ROM, and core strength.     Joya Dejesus, PT

## 2024-01-30 ENCOUNTER — CLINICAL SUPPORT (OUTPATIENT)
Dept: REHABILITATION | Facility: OTHER | Age: 39
End: 2024-01-30
Payer: COMMERCIAL

## 2024-01-30 DIAGNOSIS — M54.17 LUMBOSACRAL RADICULITIS: Primary | ICD-10-CM

## 2024-01-30 PROCEDURE — 97530 THERAPEUTIC ACTIVITIES: CPT | Mod: PN | Performed by: PHYSICAL THERAPIST

## 2024-01-30 PROCEDURE — 97112 NEUROMUSCULAR REEDUCATION: CPT | Mod: PN | Performed by: PHYSICAL THERAPIST

## 2024-01-30 RX ORDER — GABAPENTIN 100 MG/1
100 CAPSULE ORAL 3 TIMES DAILY
Qty: 60 CAPSULE | Refills: 0 | Status: SHIPPED | OUTPATIENT
Start: 2024-01-30 | End: 2024-02-20 | Stop reason: SDUPTHER

## 2024-02-01 ENCOUNTER — CLINICAL SUPPORT (OUTPATIENT)
Dept: REHABILITATION | Facility: OTHER | Age: 39
End: 2024-02-01
Payer: COMMERCIAL

## 2024-02-01 DIAGNOSIS — M54.17 LUMBOSACRAL RADICULITIS: Primary | ICD-10-CM

## 2024-02-01 PROCEDURE — 97530 THERAPEUTIC ACTIVITIES: CPT | Mod: PN

## 2024-02-01 PROCEDURE — 97112 NEUROMUSCULAR REEDUCATION: CPT | Mod: PN

## 2024-02-01 NOTE — PROGRESS NOTES
"[facility]   Physical Therapy Treatment Note      Name: Rodríguez Graves  Clinic Number: [id number]    Therapy Diagnosis:   Encounter Diagnosis   Name Primary?    Lumbosacral radiculitis Yes     Physician: Sudha Aocsta PA-C    Visit Date: 2/1/2024    Physician: Sudha Acosta PA-C     Physician Orders: PT Eval and Treat   Medical Diagnosis from Referral: M54.41 (ICD-10-CM) - Acute right-sided low back pain with right-sided sciatica  Evaluation Date: 1/10/2024  Authorization Period Expiration: 12/31/2024  Plan of Care Expiration: 4/10/2024  Progress Note Due: 2/10/2024  Date of Surgery: n/a  Visit # / Visits authorized: 5/21   FOTO: 1/ 3     Precautions: Standard      Time In: 10:30  Time Out: 11:20  Total Billable Time: 50 minutes     PTA Visit #: 0/5       Subjective     Patient reports: no new complaints today.    He was somewhat compliant with home exercise program.  Response to previous treatment:   Functional change: none yet, ongoing     Pain: 3/10 "pain and burning sensation"   Location: right low back     Objective      Objective Measures updated at progress report unless specified.     Treatment     Rodríguez received the treatments listed below:      therapeutic exercises to develop strength, endurance, ROM, flexibility, posture, and core stabilization for 00 minutes including:  None today.     manual therapy techniques: Joint mobilizations, Myofacial release, Soft tissue Mobilization, and Friction Massage were applied to the:  for 00 minutes, including:  None today.     neuromuscular re-education activities to improve: Balance, Coordination, Kinesthetic, Sense, Proprioception, and Posture for 25 minutes. The following activities were included:  PPT + TA contraction 10 x 10"     PPT + TA contraction + Glute set into bridge 10 x10" x GTB at knees  Clam 10 x10" B  x GTB at knees  Reverse clam 10 x10" B  x RTB at ankles     Cook band pull downs with Bridge 2 x 10 x pink band  Cook band pull " "downs with SLR flexion 2 x 10 x pink band - a set for ea LE  +resisted LTR with cook band (pink) x 15 B    therapeutic activities to improve functional performance for 25 minutes, including:  Plan to initiate TA activation and core stabilization next visit --long leg bridge on PB, TA progression, shuttle, chop and lifts   Figure 4 IR/ER hip stretch 3 x 30" ea   +long leg bridge 3 x 8 - green PB    Free motion SALPD (long lat bar) + March 2 x 10 x 17#, 1 x 10 x 23#  Free motion rows + TA contraction 3 x 10 20#   Pallof press vs blue power band x 10 B     For 2 minutes: Rodríguez received training and instruction, as well as, monitoring of Iontophoresis patch with a 1 mL of Dexamethsone with 14 hour wear time to the low back. Safety and patient removal discussed. -- NP today    Patient Education and Home Exercises       Education provided:   - Added RTB to clamshells and YTB to reverse clamshells to HEP   + Added standing hip 3-way to HEP     Written Home Exercises Provided: Patient instructed to cont prior HEP. Exercises were reviewed and Rodríguez was able to demonstrate them prior to the end of the session.  Rodríguez demonstrated good  understanding of the education provided. See Electronic Medical Record under Patient Instructions for exercises provided during therapy sessions    Assessment     Progressed trunk and core strength, stabilization today with resisted LTR and long leg bridges over PB. Good tolerance with appropriate training effect noted.  Rodríguez Is progressing well towards his goals.   Patient prognosis is Good.     Patient will continue to benefit from skilled outpatient physical therapy to address the deficits listed in the problem list box on initial evaluation, provide pt/family education and to maximize pt's level of independence in the home and community environment.     Patient's spiritual, cultural and educational needs considered and pt agreeable to plan of care and goals.     Anticipated barriers to " physical therapy: standard, obesity, occupation     Goals: updated 02/01/2024  Short Term Goals (4 Weeks):   1. Pt will report 20% reduction in pain of the lumbar spine and LE for ease with ADL's (Progressing, not met)  2. PT will demonstrate improved trunk strength by a half grade in for ease with upright posture during standing activities.(Progressing, not met)  3. Pt will demonstrate improved lumbar spine ROM in all directions by a half grade for ease with bending activities. (Progressing, not met)     Long Term Goals (12 Weeks):   1. Pt will report being independent with HEP for maintenance of improvements gained during therapy sessions(Progressing, not met)  2. PT will report 50% reduction of pain of the back and LE for ease with dressing and grooming activities. (Progressing, not met)  3. Pt will demonstrate trunk and extremity strength to >=4+/5 without the provocation of pain for ease with household chores(Progressing, not met)  4. Pt will demonstrate appropriate upright posture without external cueing for ease with work related activities. (Progressing, not met)  5. Pt to demonstrate improved functional ability with FOTO-Modified Oswestry score <=20/100.(Progressing, not met)    Plan     Plan of care Certification: 1/10/2024 to 4/10/2024.     Continue pt's current POC to improve lumbar pain, ROM, and core strength.     Estefany Strauss, PT

## 2024-02-05 ENCOUNTER — CLINICAL SUPPORT (OUTPATIENT)
Dept: REHABILITATION | Facility: OTHER | Age: 39
End: 2024-02-05
Payer: COMMERCIAL

## 2024-02-05 DIAGNOSIS — M54.17 LUMBOSACRAL RADICULITIS: Primary | ICD-10-CM

## 2024-02-05 PROCEDURE — 97530 THERAPEUTIC ACTIVITIES: CPT | Mod: PN,CQ

## 2024-02-05 PROCEDURE — 97112 NEUROMUSCULAR REEDUCATION: CPT | Mod: PN,CQ

## 2024-02-05 NOTE — PROGRESS NOTES
"[facility]   Physical Therapy Treatment Note      Name: Rodríguez Graves  Clinic Number: [id number]    Therapy Diagnosis:   Encounter Diagnosis   Name Primary?    Lumbosacral radiculitis Yes       Physician: Sudha Acosta PA-C    Visit Date: 2/5/2024    Physician: Sudha Acosta PA-C     Physician Orders: PT Eval and Treat   Medical Diagnosis from Referral: M54.41 (ICD-10-CM) - Acute right-sided low back pain with right-sided sciatica  Evaluation Date: 1/10/2024  Authorization Period Expiration: 12/31/2024  Plan of Care Expiration: 4/10/2024  Progress Note Due: 2/10/2024  Date of Surgery: n/a  Visit # / Visits authorized: 7/20  FOTO: 1/ 3     Precautions: Standard      Time In: 1005  Time Out: 1100  Total Billable Time: 55 minutes         Subjective     Patient reports: his back bothers him the most when at work/standing for extended periods of time.     He was somewhat compliant with home exercise program.  Response to previous treatment: Felt fine, no issues  Functional change: none yet, ongoing     Pain: 3/10 "pain and burning sensation"   Location: right low back     Objective      Objective Measures updated at progress report unless specified.     Treatment     Rodríguez received the treatments listed below:        neuromuscular re-education activities to improve: Balance, Coordination, Kinesthetic, Sense, Proprioception, and Posture for 35 minutes. The following activities were included:    +PPT 3" hold x 20  +Bridging, 3" hold x 30  +SLR +pilates ring press down, 3x10 R/L  +PPT on blue ball, 30x  +Pallof press silver TB, 30x R/L  +Chops RTB, 30x        therapeutic activities to improve functional performance for 20 minutes, including:    +Seated silver SB rollouts, 10x10"  +LTR on red ball, x 3 min  +TRX plank squats, 3x10      Patient Education and Home Exercises       Education provided:   - Exercise form and rationale     Written Home Exercises Provided: Patient instructed to cont prior " HEP. Exercises were reviewed and Rodríguez was able to demonstrate them prior to the end of the session.  Rodríugez demonstrated good  understanding of the education provided. See Electronic Medical Record under Patient Instructions for exercises provided during therapy sessions    Assessment     Rodríguez tolerated exercise well today. He reported decreased pain/symptoms with PPT.  Pt was able to complete trunk/back rotational exercises with decreased muscle guarding. Muscle weakness in paraspinals/core musculature were notable during exercises. Pt was able to demonstrate improved core/transverse abdominis activation after cuing. Pt education regarding using step/alt foot placement with prolonged standing as well as seated flexion stretch. Pt verbally expressed understanding.         Rodríguez Is progressing well towards his goals.   Patient prognosis is Good.     Patient will continue to benefit from skilled outpatient physical therapy to address the deficits listed in the problem list box on initial evaluation, provide pt/family education and to maximize pt's level of independence in the home and community environment.     Patient's spiritual, cultural and educational needs considered and pt agreeable to plan of care and goals.     Anticipated barriers to physical therapy: standard, obesity, occupation     Goals: updated 02/05/2024  Short Term Goals (4 Weeks):   1. Pt will report 20% reduction in pain of the lumbar spine and LE for ease with ADL's (Progressing, not met)  2. PT will demonstrate improved trunk strength by a half grade in for ease with upright posture during standing activities.(Progressing, not met)  3. Pt will demonstrate improved lumbar spine ROM in all directions by a half grade for ease with bending activities. (Progressing, not met)     Long Term Goals (12 Weeks):   1. Pt will report being independent with HEP for maintenance of improvements gained during therapy sessions(Progressing, not met)  2. PT will  report 50% reduction of pain of the back and LE for ease with dressing and grooming activities. (Progressing, not met)  3. Pt will demonstrate trunk and extremity strength to >=4+/5 without the provocation of pain for ease with household chores(Progressing, not met)  4. Pt will demonstrate appropriate upright posture without external cueing for ease with work related activities. (Progressing, not met)  5. Pt to demonstrate improved functional ability with FOTO-Modified Oswestry score <=20/100.(Progressing, not met)    Plan     Plan of care Certification: 1/10/2024 to 4/10/2024.     Continue pt's current POC to improve lumbar pain, ROM, and core strength.     Evens Atwood, PTA

## 2024-02-07 ENCOUNTER — CLINICAL SUPPORT (OUTPATIENT)
Dept: REHABILITATION | Facility: OTHER | Age: 39
End: 2024-02-07
Payer: COMMERCIAL

## 2024-02-07 DIAGNOSIS — M54.17 LUMBOSACRAL RADICULITIS: Primary | ICD-10-CM

## 2024-02-07 PROCEDURE — 97530 THERAPEUTIC ACTIVITIES: CPT | Mod: PN

## 2024-02-07 PROCEDURE — 97112 NEUROMUSCULAR REEDUCATION: CPT | Mod: PN

## 2024-02-07 NOTE — PROGRESS NOTES
"[facility]   Physical Therapy Treatment Note      Name: Rodríguez Graves  Clinic Number: [id number]    Therapy Diagnosis:   Encounter Diagnosis   Name Primary?    Lumbosacral radiculitis Yes         Physician: Sudha Acosta PA-C    Visit Date: 2/7/2024    Physician: Sudha Acosta PA-C     Physician Orders: PT Eval and Treat   Medical Diagnosis from Referral: M54.41 (ICD-10-CM) - Acute right-sided low back pain with right-sided sciatica  Evaluation Date: 1/10/2024  Authorization Period Expiration: 12/31/2024  Plan of Care Expiration: 4/10/2024  Progress Note Due: 2/10/2024  Date of Surgery: n/a  Visit # / Visits authorized: 8/20  FOTO: 1/ 3     Precautions: Standard      Time In: 1000  Time Out: 1050  Total Billable Time: 50 minutes         Subjective     Patient reports: able to take the past couple days off from work and finds that rest makes his back feel better, but moving around too much at work makes it feel worse.      He was somewhat compliant with home exercise program.  Response to previous treatment: Felt fine, no issues  Functional change: none yet, ongoing     Pain: 3/10 "pain and burning sensation"   Location: right low back     Objective      Objective Measures updated at progress report unless specified.     Treatment     Rodríguez received the treatments listed below:        neuromuscular re-education activities to improve: Balance, Coordination, Kinesthetic, Sense, Proprioception, and Posture for 35 minutes. The following activities were included:    PPT 3" hold x 20  Bridging, 3" hold x 30  SLR +pilates ring press down, 3x10 R/L  PPT on blue ball (LLB), 30x    Pallof press silver TB, 30x R/L  Chops RTB, 30x        therapeutic activities to improve functional performance for 15 minutes, including:    Seated silver SB rollouts, 10x10"  LTR on red ball, x 3 min  TRX plank squats, 3x10      Patient Education and Home Exercises       Education provided:   - Exercise form and rationale "     Written Home Exercises Provided: Patient instructed to cont prior HEP. Exercises were reviewed and Rodríguez was able to demonstrate them prior to the end of the session.  Rodríguez demonstrated good  understanding of the education provided. See Electronic Medical Record under Patient Instructions for exercises provided during therapy sessions    Assessment     Good tolerance with overall session today without increased sx. Fatigue noted with SLR with pilates press down, mm juddering observed, due to continued trunk and core weakness, decreased endurance.        Rodríguez Is progressing well towards his goals.   Patient prognosis is Good.     Patient will continue to benefit from skilled outpatient physical therapy to address the deficits listed in the problem list box on initial evaluation, provide pt/family education and to maximize pt's level of independence in the home and community environment.     Patient's spiritual, cultural and educational needs considered and pt agreeable to plan of care and goals.     Anticipated barriers to physical therapy: standard, obesity, occupation     Goals: updated 02/07/2024  Short Term Goals (4 Weeks):   1. Pt will report 20% reduction in pain of the lumbar spine and LE for ease with ADL's (Progressing, not met)  2. PT will demonstrate improved trunk strength by a half grade in for ease with upright posture during standing activities.(Progressing, not met)  3. Pt will demonstrate improved lumbar spine ROM in all directions by a half grade for ease with bending activities. (Progressing, not met)     Long Term Goals (12 Weeks):   1. Pt will report being independent with HEP for maintenance of improvements gained during therapy sessions(Progressing, not met)  2. PT will report 50% reduction of pain of the back and LE for ease with dressing and grooming activities. (Progressing, not met)  3. Pt will demonstrate trunk and extremity strength to >=4+/5 without the provocation of pain for ease  with household chores(Progressing, not met)  4. Pt will demonstrate appropriate upright posture without external cueing for ease with work related activities. (Progressing, not met)  5. Pt to demonstrate improved functional ability with FOTO-Modified Oswestry score <=20/100.(Progressing, not met)    Plan     Plan of care Certification: 1/10/2024 to 4/10/2024.     Continue pt's current POC to improve lumbar pain, ROM, and core strength.     Estefany Strauss, PT

## 2024-02-11 DIAGNOSIS — M54.41 ACUTE RIGHT-SIDED LOW BACK PAIN WITH RIGHT-SIDED SCIATICA: ICD-10-CM

## 2024-02-12 ENCOUNTER — CLINICAL SUPPORT (OUTPATIENT)
Dept: REHABILITATION | Facility: OTHER | Age: 39
End: 2024-02-12
Payer: COMMERCIAL

## 2024-02-12 DIAGNOSIS — M54.17 LUMBOSACRAL RADICULITIS: Primary | ICD-10-CM

## 2024-02-12 PROCEDURE — 97530 THERAPEUTIC ACTIVITIES: CPT | Mod: PN

## 2024-02-12 PROCEDURE — 97112 NEUROMUSCULAR REEDUCATION: CPT | Mod: PN

## 2024-02-12 NOTE — PROGRESS NOTES
"[facility]   Physical Therapy Treatment Note      Name: Rodríguez Graves  Clinic Number: [id number]    Therapy Diagnosis:   Encounter Diagnosis   Name Primary?    Lumbosacral radiculitis Yes         Physician: Sudha Acosta PA-C    Visit Date: 2/12/2024    Physician: Sudha Acosta PA-C     Physician Orders: PT Eval and Treat   Medical Diagnosis from Referral: M54.41 (ICD-10-CM) - Acute right-sided low back pain with right-sided sciatica  Evaluation Date: 1/10/2024  Authorization Period Expiration: 12/31/2024  Plan of Care Expiration: 4/10/2024  Progress Note Due: 2/10/2024  Date of Surgery: n/a  Visit # / Visits authorized: 9/20  FOTO: 1/ 3     Precautions: Standard      Time In: 1:00pm  Time Out: 2:00pm  Total Billable Time: 60 minutes         Subjective     Patient reports: "if I've been on my feet for a while, then I'll start feeling the pain in my lower back or down into the top of the hip. Otherwise I feel it at night coming home from work, or morning stiffness."    He was somewhat compliant with home exercise program.  Response to previous treatment: Felt fine, no issues  Functional change: none yet, ongoing     Pain: 3/10 "pain and burning sensation"   Location: right low back     Objective      Objective Measures updated at progress report unless specified.     Updated 2/12/2024      Thoracic/Lumbar AROM: Pain/Dysfunction with Movement:   Flexion Full, fingertips to toes, c/o stiffness   Extension WNL, no pain   Right side bending Mod deluna, c/o stiffness/tightness on R   Left side bending Mod [place], tension on R LB   Right rotation Min deluna, slight pain over R low back   Left rotation WNL, no pain      Trunk strength: core(abdominals) = poor, extensors = fair     Lower Extremity Strength  Right LE   Left LE     Hip flexion: 4+/5 Hip flexion: 4+/5   Hip extension:  4+/5 - knee ext  4/5 - knee flex Hip extension: 5/5 - knee ext  4+/5 - knee flex   Hip abduction: 4/5 Hip abduction: 4+/5   Hip " "adduction:  5/5 Hip adduction:  5/5   Hip Internal rotation    5/5 Hip Internal rotation 4+/5       Treatment     Rodríguez received the treatments listed below:        neuromuscular re-education activities to improve: Balance, Coordination, Kinesthetic, Sense, Proprioception, and Posture for 30 minutes. The following activities were included:    PPT 3" hold x 20  Bridging, 3" hold x 30 - with belt for glute med activation  SLR +pilates ring press down, 3x10 R/L  PPT on blue ball (LLB), 30x    Pallof press (red power band), 30x R/L  Chops (red power band), 30x    +modified FWD plank (EOM) 3 x 30" - progress to floor next visit  +modified side plank (EOM) 3 x 30" - progress to floor next visit    therapeutic activities to improve functional performance for 30 minutes, including:    +suitcase carries 2 x 50 ft x 20# KB   +urias carries 2 x 50 ft x 15# KB  +step up (6" step) x 20 x 10# KB  +good mornings x 20 x 2# dowel  +Hip hinge squat with KB tap on 12" box 2x10  +Goblet squat with hip tap at 18" step 2x10    Rows - nv  SALPD - nv    Patient Education and Home Exercises       Education provided:   - Exercise form and rationale     Written Home Exercises Provided: Patient instructed to cont prior HEP. Exercises were reviewed and Rodríguez was able to demonstrate them prior to the end of the session.  Rodríguez demonstrated good  understanding of the education provided. See Electronic Medical Record under Patient Instructions for exercises provided during therapy sessions    Assessment     Reassessment performed today with good improvements in trunk AROM but continues to lack sufficient hip, trunk, and core strength necessary for dynamic stabilization during work-related activities. Attempted to progress functional strengthening today with good tolerance and appropriate training effect noted. Plan to progress core strength/stabilization next visit.        Rodríguez Is progressing well towards his goals.   Patient prognosis is Good. "     Patient will continue to benefit from skilled outpatient physical therapy to address the deficits listed in the problem list box on initial evaluation, provide pt/family education and to maximize pt's level of independence in the home and community environment.     Patient's spiritual, cultural and educational needs considered and pt agreeable to plan of care and goals.     Anticipated barriers to physical therapy: standard, obesity, occupation     Goals: updated 02/12/2024  Short Term Goals (4 Weeks):   1. Pt will report 20% reduction in pain of the lumbar spine and LE for ease with ADL's (Progressing, not met)  2. PT will demonstrate improved trunk strength by a half grade in for ease with upright posture during standing activities.(Progressing, not met)  3. Pt will demonstrate improved lumbar spine ROM in all directions by a half grade for ease with bending activities. (Progressing, not met)     Long Term Goals (12 Weeks):   1. Pt will report being independent with HEP for maintenance of improvements gained during therapy sessions(Progressing, not met)  2. PT will report 50% reduction of pain of the back and LE for ease with dressing and grooming activities. (Progressing, not met)  3. Pt will demonstrate trunk and extremity strength to >=4+/5 without the provocation of pain for ease with household chores(Progressing, not met)  4. Pt will demonstrate appropriate upright posture without external cueing for ease with work related activities. (Progressing, not met)  5. Pt to demonstrate improved functional ability with FOTO-Modified Oswestry score <=20/100.(Progressing, not met)    Plan     Plan of care Certification: 1/10/2024 to 4/10/2024.     Continue pt's current POC to improve lumbar pain, ROM, and core strength.     Estefany Strauss, PT

## 2024-02-14 RX ORDER — TIZANIDINE 4 MG/1
TABLET ORAL
Qty: 20 TABLET | Refills: 0 | Status: SHIPPED | OUTPATIENT
Start: 2024-02-14 | End: 2024-03-13 | Stop reason: SDUPTHER

## 2024-02-15 ENCOUNTER — CLINICAL SUPPORT (OUTPATIENT)
Dept: REHABILITATION | Facility: OTHER | Age: 39
End: 2024-02-15
Payer: COMMERCIAL

## 2024-02-15 ENCOUNTER — DOCUMENTATION ONLY (OUTPATIENT)
Dept: REHABILITATION | Facility: OTHER | Age: 39
End: 2024-02-15

## 2024-02-15 DIAGNOSIS — M54.17 LUMBOSACRAL RADICULITIS: Primary | ICD-10-CM

## 2024-02-15 PROCEDURE — 97530 THERAPEUTIC ACTIVITIES: CPT | Mod: PN,CQ

## 2024-02-15 PROCEDURE — 97112 NEUROMUSCULAR REEDUCATION: CPT | Mod: PN,CQ

## 2024-02-15 NOTE — PROGRESS NOTES
"[facility]   Physical Therapy Treatment Note      Name: Rodríguez Graves  Clinic Number: [id number]    Therapy Diagnosis:   Encounter Diagnosis   Name Primary?    Lumbosacral radiculitis Yes       Physician: Sudha Acosta PA-C    Visit Date: 2/15/2024    Physician: Sudha Acosta PA-C     Physician Orders: PT Eval and Treat   Medical Diagnosis from Referral: M54.41 (ICD-10-CM) - Acute right-sided low back pain with right-sided sciatica  Evaluation Date: 1/10/2024  Authorization Period Expiration: 12/31/2024  Plan of Care Expiration: 4/10/2024  Progress Note Due: 2/10/2024  Date of Surgery: n/a  Visit # / Visits authorized: 10/21  FOTO: 1/ 3     Precautions: Standard      Time In: 10:33 am  Time Out: 11:15 am   Total Billable Time: 42 minutes         Subjective     Patient reports: he feels like the pain is the same. States he has days where it bothers him more. "The more I work, the more it hurts." States he does his HEP when he is home, and they sometimes help.     He was somewhat compliant with home exercise program.  Response to previous treatment: Felt fine, no issues  Functional change: states the burning sensation has gone away, but he still feels the pain sometimes     Pain: 0/10 "pain and burning sensation"   Location: right low back     Objective      Objective Measures updated at progress report unless specified.     Updated 2/12/2024      Thoracic/Lumbar AROM: Pain/Dysfunction with Movement:   Flexion Full, fingertips to toes, c/o stiffness   Extension WNL, no pain   Right side bending Mod deluna, c/o stiffness/tightness on R   Left side bending Mod deluna, tension on R LB   Right rotation Min deluna, slight pain over R low back   Left rotation WNL, no pain      Trunk strength: core(abdominals) = poor, extensors = fair     Lower Extremity Strength  Right LE   Left LE     Hip flexion: 4+/5 Hip flexion: 4+/5   Hip extension:  4+/5 - knee ext  4/5 - knee flex Hip extension: 5/5 - knee ext  4+/5 " "- knee flex   Hip abduction: 4/5 Hip abduction: 4+/5   Hip adduction:  5/5 Hip adduction:  5/5   Hip Internal rotation    5/5 Hip Internal rotation 4+/5       Treatment     Rodríguez received the treatments listed below:        neuromuscular re-education activities to improve: Balance, Coordination, Kinesthetic, Sense, Proprioception, and Posture for 17 minutes. The following activities were included:    PPT 3" hold x 20   Bridging, 3" hold x 30 - with belt for glute med activation  SLR +pilates ring press down, 3x10 R/L  PPT on blue ball (LLB), 30x     Pallof press (red power band), 30x R/L  Chops (red power band), 30x    modified FWD on mat (on hands) 3 x 30"   modified side on mat (on elbow) 2 x 30"     therapeutic activities to improve functional performance for 25 minutes, including:    suitcase carries 2 x 50 ft x 20# KB   urias carries 2 x 50 ft x 15# KB   + Rack carries 2 x 50 ft x 15# KB   +step up (6" step) x 20 x 10# KB   good mornings x 20 x 2# dowel   Hip hinge squat with KB tap on 12" box 2x10  Goblet squat with hip tap at 18" step 2x10     +Free motion SALPD (long lat bar) + March 2 x 10 17# (increase weight nv)   Free motion rows + TA contraction 3 x 10 20#     Patient Education and Home Exercises       Education provided:   - Exercise form and rationale     Written Home Exercises Provided: Patient instructed to cont prior HEP. Exercises were reviewed and Rodríguez was able to demonstrate them prior to the end of the session.  Rodríguez demonstrated good  understanding of the education provided. See Electronic Medical Record under Patient Instructions for exercises provided during therapy sessions    Assessment     Rodríguez had good tolerance to treatment progressions today with no adverse effects. Demonstrates good TA control with dynamic stabilization. Progressed planks to full on mat today, which pt able to maintain with good form and appropriate training effect achieved. Continue to monitor and progress pt as " samuel Arreguin Is progressing well towards his goals.   Patient prognosis is Good.     Patient will continue to benefit from skilled outpatient physical therapy to address the deficits listed in the problem list box on initial evaluation, provide pt/family education and to maximize pt's level of independence in the home and community environment.     Patient's spiritual, cultural and educational needs considered and pt agreeable to plan of care and goals.     Anticipated barriers to physical therapy: standard, obesity, occupation     Goals: updated 02/15/2024  Short Term Goals (4 Weeks):   1. Pt will report 20% reduction in pain of the lumbar spine and LE for ease with ADL's (Progressing, not met)  2. PT will demonstrate improved trunk strength by a half grade in for ease with upright posture during standing activities.(Progressing, not met)  3. Pt will demonstrate improved lumbar spine ROM in all directions by a half grade for ease with bending activities. (Progressing, not met)     Long Term Goals (12 Weeks):   1. Pt will report being independent with HEP for maintenance of improvements gained during therapy sessions(Progressing, not met)  2. PT will report 50% reduction of pain of the back and LE for ease with dressing and grooming activities. (Progressing, not met)  3. Pt will demonstrate trunk and extremity strength to >=4+/5 without the provocation of pain for ease with household chores(Progressing, not met)  4. Pt will demonstrate appropriate upright posture without external cueing for ease with work related activities. (Progressing, not met)  5. Pt to demonstrate improved functional ability with FOTO-Modified Oswestry score <=20/100.(Progressing, not met)    Plan     Plan of care Certification: 1/10/2024 to 4/10/2024.     Continue pt's current POC to improve lumbar pain, ROM, and core strength.     Sidra Talavera, PTA

## 2024-02-15 NOTE — PROGRESS NOTES
Physical Therapist and Physical Therapist Assistant met face to face to discuss patient's treatment plan and progress towards established goals. Pt will be seen by a physical therapist minimally every 6th visit or every 30 days.    Sidra Talavera, DANTE  02/15/2024

## 2024-02-16 NOTE — PROGRESS NOTES
"[facility]   Physical Therapy Treatment Note      Name: Rodríguez Graves  Clinic Number: [id number]    Therapy Diagnosis:   Encounter Diagnosis   Name Primary?    Lumbosacral radiculitis Yes         Physician: Sudha Acosta PA-C    Visit Date: 2/19/2024    Physician: Sudha Acosta PA-C     Physician Orders: PT Eval and Treat   Medical Diagnosis from Referral: M54.41 (ICD-10-CM) - Acute right-sided low back pain with right-sided sciatica  Evaluation Date: 1/10/2024  Authorization Period Expiration: 12/31/2024  Plan of Care Expiration: 4/10/2024  Progress Note Due: 2/10/2024  Date of Surgery: n/a  Visit # / Visits authorized: 10/21  FOTO: 1/ 3     Precautions: Standard      Time In: 10:00 am  Time Out: 11:00 am   Total Billable Time: 60 minutes         Subjective     Patient reports: min soreness after last visit. Worked on Saturday and continued have similar pain in R low back and down into glute, which continued into Sunday. Denies pain today. "It really only occurs when I'm working."    He was somewhat compliant with home exercise program.  Response to previous treatment: Felt fine, no issues  Functional change: states the burning sensation has gone away, but he still feels the pain sometimes     Pain: 0/10 "pain and burning sensation"   Location: right low back     Objective      Objective Measures updated at progress report unless specified.     Updated 2/12/2024      Thoracic/Lumbar AROM: Pain/Dysfunction with Movement:   Flexion Full, fingertips to toes, c/o stiffness   Extension WNL, no pain   Right side bending Mod deluna, c/o stiffness/tightness on R   Left side bending Mod deluna, tension on R LB   Right rotation Min deluna, slight pain over R low back   Left rotation WNL, no pain      Trunk strength: core(abdominals) = poor, extensors = fair     Lower Extremity Strength  Right LE   Left LE     Hip flexion: 4+/5 Hip flexion: 4+/5   Hip extension:  4+/5 - knee ext  4/5 - knee flex Hip " "extension: 5/5 - knee ext  4+/5 - knee flex   Hip abduction: 4/5 Hip abduction: 4+/5   Hip adduction:  5/5 Hip adduction:  5/5   Hip Internal rotation    5/5 Hip Internal rotation 4+/5       Treatment     Rodríguez received the treatments listed below:        neuromuscular re-education activities to improve: Balance, Coordination, Kinesthetic, Sense, Proprioception, and Posture for 22 minutes. The following activities were included:    PPT 3" hold x 20   Bridging, 3" hold x 30 - with belt for glute med activation  SLR +pilates ring press down, 3x10 R/L  PPT on blue ball (LLB), 30x     Pallof press (red power band), 30x R/L  Chops (red power band), 30x    modified FWD plank x 3 x 30" -- changed to full plank (on elbows) on tx table  Modified side plank x 2 x 30" -- changed to full plank (on elbows) on tx table    therapeutic activities to improve functional performance for 23 minutes, including:    suitcase carries 2 x 50 ft x 20# KB   urias carries 2 x 50 ft x 15# KB   +Goblet carry 2 x 50 ft x 20#  Rack carries 2 x 50 ft x 15# KB   step up (6" step) x 20 x 10# KB   good mornings x 20 x 2# dowel   Hip hinge squat with KB tap on 12" box 2x10  Goblet squat with hip tap at 18" step 2x10     Free motion SALPD (long lat bar) + March 1 x 10 23#, 2x10 x 30# -- increase resistance next visit   Free motion rows + TA contraction 3 x 10 20#       manual therapy techniques: Joint mobilizations, Myofacial release, and Soft tissue Mobilization were applied to the: R low back and hip for 15 minutes.   5 min x manual STM/MFR with/without c/r to piriformis, glutes med/min  10 min x dry needling - see note by Estefany Mueller, PT    Patient Education and Home Exercises       Education provided:   - Exercise form and rationale     Written Home Exercises Provided: Patient instructed to cont prior HEP. Exercises were reviewed and Rodríguez was able to demonstrate them prior to the end of the session.  Rodríguez demonstrated good  understanding of " the education provided. See Electronic Medical Record under Patient Instructions for exercises provided during therapy sessions    Assessment     Initiated dry needling as pt presents with marked MF restrictions to piriformis and glutes < lumbar paraspinals/QL. Good tolerance with notable improvement in soft tissue quality but min change in overall c/o pain; difficult to determine tx necessity as pt enters clinic today with stiffness>pain today. Progressed planks today into full plank position; muscular juddering noted but able to perform without c/o pain.    Rodríguez Is progressing well towards his goals.   Patient prognosis is Good.     Patient will continue to benefit from skilled outpatient physical therapy to address the deficits listed in the problem list box on initial evaluation, provide pt/family education and to maximize pt's level of independence in the home and community environment.     Patient's spiritual, cultural and educational needs considered and pt agreeable to plan of care and goals.     Anticipated barriers to physical therapy: standard, obesity, occupation     Goals: updated 02/19/2024  Short Term Goals (4 Weeks):   1. Pt will report 20% reduction in pain of the lumbar spine and LE for ease with ADL's (Progressing, not met)  2. PT will demonstrate improved trunk strength by a half grade in for ease with upright posture during standing activities.(Progressing, not met)  3. Pt will demonstrate improved lumbar spine ROM in all directions by a half grade for ease with bending activities. (Progressing, not met)     Long Term Goals (12 Weeks):   1. Pt will report being independent with HEP for maintenance of improvements gained during therapy sessions(Progressing, not met)  2. PT will report 50% reduction of pain of the back and LE for ease with dressing and grooming activities. (Progressing, not met)  3. Pt will demonstrate trunk and extremity strength to >=4+/5 without the provocation of pain for  ease with household chores(Progressing, not met)  4. Pt will demonstrate appropriate upright posture without external cueing for ease with work related activities. (Progressing, not met)  5. Pt to demonstrate improved functional ability with FOTO-Modified Oswestry score <=20/100.(Progressing, not met)    Plan     Plan of care Certification: 1/10/2024 to 4/10/2024.     Continue pt's current POC to improve lumbar pain, ROM, and core strength.     Estefany Strauss, PT

## 2024-02-19 ENCOUNTER — CLINICAL SUPPORT (OUTPATIENT)
Dept: REHABILITATION | Facility: OTHER | Age: 39
End: 2024-02-19
Payer: COMMERCIAL

## 2024-02-19 DIAGNOSIS — M54.41 ACUTE RIGHT-SIDED LOW BACK PAIN WITH RIGHT-SIDED SCIATICA: ICD-10-CM

## 2024-02-19 DIAGNOSIS — M54.17 LUMBOSACRAL RADICULITIS: Primary | ICD-10-CM

## 2024-02-19 PROCEDURE — 97112 NEUROMUSCULAR REEDUCATION: CPT | Mod: PN

## 2024-02-19 PROCEDURE — 97140 MANUAL THERAPY 1/> REGIONS: CPT | Mod: PN

## 2024-02-19 PROCEDURE — 97530 THERAPEUTIC ACTIVITIES: CPT | Mod: PN

## 2024-02-19 NOTE — TELEPHONE ENCOUNTER
No care due was identified.  [facility] Embedded Care Due Messages. Reference number: [id number].   2/19/2024 3:18:36 AM CST

## 2024-02-19 NOTE — PROGRESS NOTES
Physical Therapy Functional Dry Needling Note      Date:  2/19/2024    Name: Rodríguez Graves  Clinic Number: [id number]    Therapy Diagnosis:   Encounter Diagnosis   Name Primary?    Lumbosacral radiculitis Yes     Physician: Sudha Acosta, DANGELO    Total Time:  10 min    Subjective      Pt reports: tension in R glute that will radiate to the R low back with work activities, denies sharp pain today.  See note by Estefany Strauss, PT     Pain: 1/10  Location: right low back and posterior hip      Objective      See EMR under MEDIA for written consent provided, signed, dated on 2/19/2024     Palpation Assessment to determine the necessity for Functional Dry Needling - marked TTP and increased tone to R piriformis, glutes med/min > glute max, lumbar paraspinals, and QL.     Rodríguez received the following manual therapy techniques: dry needling to R back and hip with 3 in needles with no adverse effects.    Homeostatic points:  1. Deep Radial  2. Greater Auricular  3. Spinal Accessory  4. Saphenous  5. Deep Fibular  6. Tibial  7. Greater Occipital  8. Suprascapular ( infraspinatus)  9. Lateral Antebrachial Cutaneous  10. Sural  11. Lateral Popliteal  12. Superficial Radial  13. Dorsal Scapular  14. Superior Cluneal  15. Posterior Cutaneous L 2  16. Inferior Gluteal  17. Lateral Pectoral  18. Ilitotibal  19. Infraorbital  20. Spinous process T7  21. Posterior cutaneous  T6  22. Posterior cutaneous L 5  23. Supraorbital  24. Common fibular    Paravertebral Points:  R L4-S1    Symptomatic Points:   Pt prone   Glute med x2, glute min x 2   Piriformis x2   OI   Glute max   R QL       Assessment      Patient demonstrated appropriate response to Functional Dry Needling. Twitch response to DN to piriformis and lateral glutes today. Winding technique used every 5 minutes. Good improvement in soft tissue quality with min change in sx with standing and walking. Consider progression into further LSP next visit.      Home Exercises and  "Patient Education      Education provided:   Purpose, benefits, and potential side effects of dry needling.   Educated pt to use heat following treatment sessions to reduce c/o pain or soreness and to improve circulation to needled sites.   Encouraged pt to continue with HEP to maintain flexibility, ROM, and functional mobility.    Written Handout on response to FDN provided: none provided today.    Rodríguez demonstrated good understanding of the education provided.     See EMR under "Patient Instructions" for exercises provided.    Plan      Monitor response to Functional Dry Needling. Continue with Functional Dry Needling in POC as appropriate.       Estefany Strauss, PT  2/19/2024            "

## 2024-02-20 RX ORDER — GABAPENTIN 100 MG/1
100 CAPSULE ORAL 3 TIMES DAILY
Qty: 60 CAPSULE | Refills: 0 | Status: SHIPPED | OUTPATIENT
Start: 2024-02-20

## 2024-02-21 DIAGNOSIS — I10 ESSENTIAL HYPERTENSION: ICD-10-CM

## 2024-02-22 ENCOUNTER — CLINICAL SUPPORT (OUTPATIENT)
Dept: REHABILITATION | Facility: OTHER | Age: 39
End: 2024-02-22
Payer: COMMERCIAL

## 2024-02-22 DIAGNOSIS — M54.17 LUMBOSACRAL RADICULITIS: Primary | ICD-10-CM

## 2024-02-22 PROCEDURE — 97112 NEUROMUSCULAR REEDUCATION: CPT | Mod: PN | Performed by: PHYSICAL THERAPIST

## 2024-02-22 PROCEDURE — 97140 MANUAL THERAPY 1/> REGIONS: CPT | Mod: PN | Performed by: PHYSICAL THERAPIST

## 2024-02-22 NOTE — PROGRESS NOTES
"[facility]   Physical Therapy Treatment Note      Name: Rodríguez Graves  [facility] Number: [id number]    Therapy Diagnosis:   Encounter Diagnosis   Name Primary?    Lumbosacral radiculitis Yes         Physician: Sudha Acosta PA-C    Visit Date: 2/22/2024    Physician: Sudha Acosta PA-C     Physician Orders: PT Eval and Treat   Medical Diagnosis from Referral: M54.41 (ICD-10-CM) - Acute right-sided low back pain with right-sided sciatica  Evaluation Date: 1/10/2024  Authorization Period Expiration: 12/31/2024  Plan of Care Expiration: 4/10/2024  Progress Note Due: 2/10/2024  Date of Surgery: n/a  Visit # / Visits authorized: 11/21  FOTO: 1/ 3     Precautions: Standard      Time In: 1045 (arrived late)  Time Out: 1120   Total Billable Time: 35 minutes         Subjective     Patient reports:improvements with symptoms following DN treatment last session. He did not note much of a difference in pain at work. He says his symptoms remain unchanged today but would like to try DN again.      He was somewhat compliant with home exercise program.  Response to previous treatment: Felt fine, no issues  Functional change: states the burning sensation has gone away, but he still feels the pain sometimes     Pain: 0/10 "pain and burning sensation"   Location: right low back     Objective      Objective Measures updated at progress report unless specified.     Updated 2/12/2024      Thoracic/Lumbar AROM: Pain/Dysfunction with Movement:   Flexion Full, fingertips to toes, c/o stiffness   Extension WNL, no pain   Right side bending Mod deluna, c/o stiffness/tightness on R   Left side bending Mod deluna, tension on R LB   Right rotation Min deluna, slight pain over R low back   Left rotation WNL, no pain      Trunk strength: core(abdominals) = poor, extensors = fair     Lower Extremity Strength  Right LE   Left LE     Hip flexion: 4+/5 Hip flexion: 4+/5   Hip extension:  4+/5 - knee ext  4/5 - knee flex Hip extension: 5/5 " "- knee ext  4+/5 - knee flex   Hip abduction: 4/5 Hip abduction: 4+/5   Hip adduction:  5/5 Hip adduction:  5/5   Hip Internal rotation    5/5 Hip Internal rotation 4+/5       Treatment     Rodríguez received the treatments listed below:        neuromuscular re-education activities to improve: Balance, Coordination, Kinesthetic, Sense, Proprioception, and Posture for 20 minutes. The following activities were included:    PPT 3" hold x 20   Bridging, 3" hold x 30 - with belt for glute med activation  SLR +pilates ring press down, 3x10 R/L  PPT on blue ball (LLB), 30x     Pallof press (Green power band), 30x R/L  Chops (red power band), 30x    modified FWD plank x 3 x 30" -- changed to full plank (on elbows) on tx table  Modified side plank x 2 x 30" -- changed to full plank (on elbows) on tx table    therapeutic activities to improve functional performance for 00 minutes, including:    suitcase carries 2 x 50 ft x 20# KB   urias carries 2 x 50 ft x 15# KB   +Goblet carry 2 x 50 ft x 20#  Rack carries 2 x 50 ft x 15# KB   step up (6" step) x 20 x 10# KB   good mornings x 20 x 2# dowel   Hip hinge squat with KB tap on 12" box 2x10  Goblet squat with hip tap at 18" step 2x10     Free motion SALPD (long lat bar) + March 1 x 10 23#, 2x10 x 30# -- increase resistance next visit   Free motion rows + TA contraction 3 x 10 20#       manual therapy techniques: Joint mobilizations, Myofacial release, and Soft tissue Mobilization were applied to the: R low back and hip for 15 minutes.   See DN note by Dr. Joya Dejesus, DPT.  5 min x manual STM/MFR with/without c/r to piriformis, glutes med/min  10 min x dry needling - see note by Estefany Mueller, PT    Patient Education and Home Exercises       Education provided:   - Exercise form and rationale     Written Home Exercises Provided: Patient instructed to cont prior HEP. Exercises were reviewed and Rodríguez was able to demonstrate them prior to the end of the session.  Rodríguez " demonstrated good  understanding of the education provided. See Electronic Medical Record under Patient Instructions for exercises provided during therapy sessions    Assessment   Pt tolerated core stabilization exercises well today. PPT and SLR with pilates ring were performed with good core activation and stability. Full planks continue to be difficult with each set but tolerated well with good training effect noted afterwards. TA exercises not performed today due to lack of time. DN performed by Dr. Joya Dejesus, DPT, see note for details.      Rodríguez Is progressing well towards his goals.   Patient prognosis is Good.     Patient will continue to benefit from skilled outpatient physical therapy to address the deficits listed in the problem list box on initial evaluation, provide pt/family education and to maximize pt's level of independence in the home and community environment.     Patient's spiritual, cultural and educational needs considered and pt agreeable to plan of care and goals.     Anticipated barriers to physical therapy: standard, obesity, occupation     Goals: updated 02/22/2024  Short Term Goals (4 Weeks):   1. Pt will report 20% reduction in pain of the lumbar spine and LE for ease with ADL's (Progressing, not met)  2. PT will demonstrate improved trunk strength by a half grade in for ease with upright posture during standing activities.(Progressing, not met)  3. Pt will demonstrate improved lumbar spine ROM in all directions by a half grade for ease with bending activities. (Progressing, not met)     Long Term Goals (12 Weeks):   1. Pt will report being independent with HEP for maintenance of improvements gained during therapy sessions(Progressing, not met)  2. PT will report 50% reduction of pain of the back and LE for ease with dressing and grooming activities. (Progressing, not met)  3. Pt will demonstrate trunk and extremity strength to >=4+/5 without the provocation of pain for ease with  household chores(Progressing, not met)  4. Pt will demonstrate appropriate upright posture without external cueing for ease with work related activities. (Progressing, not met)  5. Pt to demonstrate improved functional ability with FOTO-Modified Oswestry score <=20/100.(Progressing, not met)    Plan     Plan of care Certification: 1/10/2024 to 4/10/2024.     Continue pt's current POC to improve lumbar pain, ROM, and core strength.     Eldon Tellez, SPT

## 2024-02-22 NOTE — PROGRESS NOTES
Physical Therapy Dry Needling Note     Name: Rodríguez Graves  Clinic Number: [id number]  Therapy Diagnosis:   Encounter Diagnosis   Name Primary?    Lumbosacral radiculitis Yes     Physician: Sudha Acosta PA-C    Visit Date: 2/22/2024    Time In: 1105  Time Out: 1120  Total Billable Time: 15 minutes    Subjective: Patient states he felt some relief with needling at last visit. Today he's having some pain as he came here from work today. See treatment note.    Objective:    Application of trigger point dry needling: Pt educated on benefits and potential side effects of dry needling. Educated pt on benefits, precautions, side effects following dry needling. Educated pt to use heat following treatment sessions if pt is experiencing pain or soreness. Pt verbalized good understanding of education.  Pt signed written consent to dry needling Rx. Pt gave verbal consent for DN    Pt received dry needling to the below listed muscles using 50 and 60 mm needles.  In prone to R:  L5  S1-2  Proximal and distal piriformis  OI  Glute med x 3  Glut min x 2  Winding performed every 5 minutes during treatment.      Assessment:    Good soft tissue response to dry needling evident by increased grasp with unilateral winding at all insertion points. Winding performed every 5 minutes during treatment. No adverse effects following treatment.    Plan:  Continue per plan of care. Dry needling as needed to decrease pain.    Joya Dejesus, PT  2/22/2024

## 2024-02-26 ENCOUNTER — CLINICAL SUPPORT (OUTPATIENT)
Dept: REHABILITATION | Facility: OTHER | Age: 39
End: 2024-02-26
Payer: COMMERCIAL

## 2024-02-26 ENCOUNTER — DOCUMENTATION ONLY (OUTPATIENT)
Dept: REHABILITATION | Facility: OTHER | Age: 39
End: 2024-02-26

## 2024-02-26 DIAGNOSIS — M54.17 LUMBOSACRAL RADICULITIS: Primary | ICD-10-CM

## 2024-02-26 PROCEDURE — 97112 NEUROMUSCULAR REEDUCATION: CPT | Mod: PN,CQ

## 2024-02-26 PROCEDURE — 97110 THERAPEUTIC EXERCISES: CPT | Mod: PN,CQ

## 2024-02-26 PROCEDURE — 97530 THERAPEUTIC ACTIVITIES: CPT | Mod: PN,CQ

## 2024-02-26 NOTE — PROGRESS NOTES
"[facility]   Physical Therapy Treatment Note      Name: Rodríguez Graves  [facility] Number: [id number]    Therapy Diagnosis:   Encounter Diagnosis   Name Primary?    Lumbosacral radiculitis Yes           Physician: Sudha Acosta PA-C    Visit Date: 2/26/2024    Physician: Sudha Acosta PA-C     Physician Orders: PT Eval and Treat   Medical Diagnosis from Referral: M54.41 (ICD-10-CM) - Acute right-sided low back pain with right-sided sciatica  Evaluation Date: 1/10/2024  Authorization Period Expiration: 12/31/2024  Plan of Care Expiration: 4/10/2024  Progress Note Due: 2/10/2024  Date of Surgery: n/a  Visit # / Visits authorized: 13/21  FOTO: 1/ 3     Precautions: Standard      Time In: 1004   Time Out: 1045    Total Billable Time: 41 minutes         Subjective     Patient reports: he has been feeling better with the DN treatments. State his pain has not bothered him as much. States he feels it a little when he sits down and gets up.     He was somewhat compliant with home exercise program.  Response to previous treatment: Felt fine, no issues  Functional change: states the burning sensation has gone away, but he still feels the pain sometimes     Pain: 1/10 "pain and burning sensation"   Location: right low back     Objective      Objective Measures updated at progress report unless specified.     Updated 2/12/2024       Thoracic/Lumbar AROM: Pain/Dysfunction with Movement:   Flexion Full, fingertips to toes, c/o stiffness   Extension WNL, no pain   Right side bending Mod deluna, c/o stiffness/tightness on R   Left side bending Mod deluna, tension on R LB   Right rotation Min deluna, slight pain over R low back   Left rotation WNL, no pain      Trunk strength: core(abdominals) = poor, extensors = fair     Lower Extremity Strength  Right LE   Left LE     Hip flexion: 4+/5 Hip flexion: 4+/5   Hip extension:  4+/5 - knee ext  4/5 - knee flex Hip extension: 5/5 - knee ext  4+/5 - knee flex   Hip abduction: " "4/5 Hip abduction: 4+/5   Hip adduction:  5/5 Hip adduction:  5/5   Hip Internal rotation    5/5 Hip Internal rotation 4+/5       Treatment     Rodríguez received the treatments listed below:      Therapeutic exercises to develop strength and endurance for 8 minutes including:   + seated QL stretch w/ thoracic rotation 10 x 10" (modified to pain free ROM)     neuromuscular re-education activities to improve: Balance, Coordination, Kinesthetic, Sense, Proprioception, and Posture for 10 minutes. The following activities were included:    PPT 3" hold x 10   Bridging, 3" hold x 30 - with belt for glute med activation  SLR +pilates ring press down, 3x10 R/L   PPT on blue ball (LLB), 30x     Pallof press (Green power band), 30x R/L  Chops (red power band), 30x    Full FWD plank on elbows (on tx table) x 3 x 30"   Full side plank on elbows (on tx table) x 2 x 30"     therapeutic activities to improve functional performance for 23 minutes, including:    suitcase carries 2 x 50 ft x 20# KB   urias carries 2 x 50 ft x 20# KB   Goblet carry 2 x 50 ft x 20# KB   Rack carries 2 x 50 ft x 15# KB   step up (6" step) x 20 x 10# KB   good mornings x 20 x 2# dowel   Hip hinge squat with KB tap on 12" box 2x10  Goblet squat with hip tap at 18" step 2x10, 1 x 10 10# KB     Free motion SALPD (long lat bar) 33# + March x 1 minute x 2   Free motion rows + TA contraction 3 x 10 23#       manual therapy techniques: Joint mobilizations, Myofacial release, and Soft tissue Mobilization were applied to the: R low back and hip for 00 minutes.   See DN note by Dr. Joya Dejesus, DPT.   5 min x manual STM/MFR with/without c/r to piriformis, glutes med/min  10 min x dry needling - see note by Estefany Mueller, PT     Patient Education and Home Exercises       Education provided:   - Exercise form and rationale   + added seated QL stretch to HEP     Written Home Exercises Provided: Patient instructed to cont prior HEP. Exercises were reviewed and Rodríguez " was able to demonstrate them prior to the end of the session.  Rodríguez demonstrated good  understanding of the education provided. See Electronic Medical Record under Patient Instructions for exercises provided during therapy sessions    Assessment     Pt had good tolerance to treatment progressions today with no adverse effects. Initiated QL stretch today, which pt noted increase in discomfort with first trial, and modified to stretch in a pain free ROM. Added seated QL stretch to HEP, pt demo and verbalized good understanding. Continue to monitor and progress pt as tolerated. -- continue DN as needed.     Rodríguez Is progressing well towards his goals.   Patient prognosis is Good.     Patient will continue to benefit from skilled outpatient physical therapy to address the deficits listed in the problem list box on initial evaluation, provide pt/family education and to maximize pt's level of independence in the home and community environment.     Patient's spiritual, cultural and educational needs considered and pt agreeable to plan of care and goals.     Anticipated barriers to physical therapy: standard, obesity, occupation     Goals: updated 02/26/2024  Short Term Goals (4 Weeks):   1. Pt will report 20% reduction in pain of the lumbar spine and LE for ease with ADL's (Progressing, not met)  2. PT will demonstrate improved trunk strength by a half grade in for ease with upright posture during standing activities.(Progressing, not met)  3. Pt will demonstrate improved lumbar spine ROM in all directions by a half grade for ease with bending activities. (Progressing, not met)     Long Term Goals (12 Weeks):   1. Pt will report being independent with HEP for maintenance of improvements gained during therapy sessions(Progressing, not met)  2. PT will report 50% reduction of pain of the back and LE for ease with dressing and grooming activities. (Progressing, not met)  3. Pt will demonstrate trunk and extremity strength to  >=4+/5 without the provocation of pain for ease with household chores(Progressing, not met)  4. Pt will demonstrate appropriate upright posture without external cueing for ease with work related activities. (Progressing, not met)  5. Pt to demonstrate improved functional ability with FOTO-Modified Oswestry score <=20/100.(Progressing, not met)    Plan     Plan of care Certification: 1/10/2024 to 4/10/2024.     Continue pt's current POC to improve lumbar pain, ROM, and core strength.     Sidra Talavera, PTA

## 2024-02-26 NOTE — PROGRESS NOTES
Physical Therapist and Physical Therapist Assistant met face to face to discuss patient's treatment plan and progress towards established goals. Pt will be seen by a physical therapist minimally every 6th visit or every 30 days.    Sidra Talavera, DANTE  02/26/2024

## 2024-02-29 ENCOUNTER — CLINICAL SUPPORT (OUTPATIENT)
Dept: REHABILITATION | Facility: OTHER | Age: 39
End: 2024-02-29
Payer: COMMERCIAL

## 2024-02-29 DIAGNOSIS — M54.17 LUMBOSACRAL RADICULITIS: Primary | ICD-10-CM

## 2024-02-29 DIAGNOSIS — I10 PRIMARY HYPERTENSION: ICD-10-CM

## 2024-02-29 PROCEDURE — 97110 THERAPEUTIC EXERCISES: CPT | Mod: PN

## 2024-02-29 PROCEDURE — 97530 THERAPEUTIC ACTIVITIES: CPT | Mod: PN

## 2024-02-29 PROCEDURE — 97112 NEUROMUSCULAR REEDUCATION: CPT | Mod: PN

## 2024-02-29 NOTE — TELEPHONE ENCOUNTER
Care Due:                  Date            Visit Type   Department     Provider  --------------------------------------------------------------------------------                                EP -                              PRIMARY      [facility]  Last Visit: 03-      CARE ([facility])   MEDICINE       Bambi Bucio  Next Visit: None Scheduled  None         None Found                                                            Last  Test          Frequency    Reason                     Performed    Due Date  --------------------------------------------------------------------------------    Office Visit  15 months..  amLODIPine,                03- 05-                             hydroCHLOROthiazide......    [employer] Embedded Care Due Messages. Reference number: [id number].   2/29/2024 3:17:01 AM CST

## 2024-02-29 NOTE — PROGRESS NOTES
"[facility]   Physical Therapy Treatment Note      Name: Rodríguez Graves  Clinic Number: [id number]    Therapy Diagnosis:   Encounter Diagnosis   Name Primary?    Lumbosacral radiculitis Yes             Physician: Sudha Acosta PA-C    Visit Date: 2/29/2024    Physician: Sudha Acosta PA-C     Physician Orders: PT Eval and Treat   Medical Diagnosis from Referral: M54.41 (ICD-10-CM) - Acute right-sided low back pain with right-sided sciatica  Evaluation Date: 1/10/2024  Authorization Period Expiration: 12/31/2024  Plan of Care Expiration: 4/10/2024  Progress Note Due: 3/12/2024  Date of Surgery: n/a  Visit # / Visits authorized: 14/21  FOTO: 1/ 3     Precautions: Standard      Time In: 1030  Time Out: 11:20  Total Billable Time: 50 minutes         Subjective     Patient reports: "I'm dine most of the time, 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 It gets worse as my shift goes on, but then once I go home and rest it resolves."     He was somewhat compliant with home exercise program.  Response to previous treatment: Felt fine, no issues  Functional change: states the burning sensation has gone away, but he still feels the pain sometimes     Pain: 1/10 "pain and burning sensation"   Location: right low back     Objective      Objective Measures updated at progress report unless specified.     Updated 2/12/2024       Thoracic/Lumbar AROM: Pain/Dysfunction with Movement:   Flexion Full, fingertips to toes, c/o stiffness   Extension WNL, no pain   Right side bending Mod deluna, c/o stiffness/tightness on R   Left side bending Mod deluna, tension on R LB   Right rotation Min deluna, slight pain over R low back   Left rotation WNL, no pain      Trunk strength: core(abdominals) = poor, extensors = fair     Lower Extremity Strength  Right LE   Left LE     Hip flexion: 4+/5 Hip flexion: 4+/5   Hip extension:  4+/5 - knee ext  4/5 - knee flex Hip extension: 5/5 - knee ext  4+/5 - knee flex   Hip " "abduction: 4/5 Hip abduction: 4+/5   Hip adduction:  5/5 Hip adduction:  5/5   Hip Internal rotation    5/5 Hip Internal rotation 4+/5       Treatment     Rodríguez received the treatments listed below:      Therapeutic exercises to develop strength and endurance for 8 minutes including:   seated QL stretch w/ thoracic rotation 5 x 10" (modified to pain free ROM)     neuromuscular re-education activities to improve: Balance, Coordination, Kinesthetic, Sense, Proprioception, and Posture for 12 minutes. The following activities were included:    +90-90 TA x 2 x 10 x 5" holds    PPT 3" hold x 10   SLR +pilates ring press down, 3x10 R/L     Full FWD plank on elbows (on tx table) x 3 x 30"   Full side plank on elbows (on tx table) x 3 x 30"     therapeutic activities to improve functional performance for 30 minutes, including:    suitcase carries 2 x 50 ft x 30# KB   urias carries 2 x 50 ft x 20# KB   Goblet carry 2 x 50 ft x 20# KB   +OH purse carry 2 x 50 ft x 10# KB at end of orange power band  Rack carries 2 x 50 ft x 15# KB   step up (6" step) x 20 x 10# KB   good mornings x 20 x 2# dowel   Hip hinge squat with KB tap on 12" box 2x10  Goblet squat with hip tap at 18" step 2x10, 1 x 10 10# KB     Free motion SALPD (long lat bar) 37# + March x 1 minute x 2   Free motion rows + TA contraction 3 x 10 23#   +anti rotations 2 x 10 x teal power band  +reverse hyperextensions x 2 x 10    manual therapy techniques: Joint mobilizations, Myofacial release, and Soft tissue Mobilization were applied to the: R low back and hip for 00 minutes.   See DN note by Dr. Joya Dejesus, DPT.   5 min x manual STM/MFR with/without c/r to piriformis, glutes med/min  10 min x dry needling - see note by Estefany Mueller, PT     Patient Education and Home Exercises       Education provided:   - Exercise form and rationale   + added seated QL stretch to HEP     Written Home Exercises Provided: Patient instructed to cont prior HEP. Exercises were " reviewed and Rodríguez was able to demonstrate them prior to the end of the session.  Rodríguez demonstrated good  understanding of the education provided. See Electronic Medical Record under Patient Instructions for exercises provided during therapy sessions    Assessment     Defer MPT 2* to time constraints. Progressed TA activation into 90-90 position, with pt demonstrating increased muscular juddering and quick to fatigue in abdominals. Progressed dynamic trunk stabilization with reverse hyperextensions and anti rotations today with good tolerance and appropriate training effect noted. Plan to progress as tolerated next visit as pt demonstrates continued trunk weakness, instability, that might facilitate c/c. As pt complaints remain relatively unchanged since previous visit, advised pt to F/U with MD for further diagnostic care as there is no imaging present for LSP.    Rodríguez Is progressing well towards his goals.   Patient prognosis is Good.     Patient will continue to benefit from skilled outpatient physical therapy to address the deficits listed in the problem list box on initial evaluation, provide pt/family education and to maximize pt's level of independence in the home and community environment.     Patient's spiritual, cultural and educational needs considered and pt agreeable to plan of care and goals.     Anticipated barriers to physical therapy: standard, obesity, occupation     Goals: updated 03/01/2024  Short Term Goals (4 Weeks):   1. Pt will report 20% reduction in pain of the lumbar spine and LE for ease with ADL's (Progressing, not met)  2. PT will demonstrate improved trunk strength by a half grade in for ease with upright posture during standing activities.(Progressing, not met)  3. Pt will demonstrate improved lumbar spine ROM in all directions by a half grade for ease with bending activities. (Progressing, not met)     Long Term Goals (12 Weeks):   1. Pt will report being independent with HEP for  maintenance of improvements gained during therapy sessions(Progressing, not met)  2. PT will report 50% reduction of pain of the back and LE for ease with dressing and grooming activities. (Progressing, not met)  3. Pt will demonstrate trunk and extremity strength to >=4+/5 without the provocation of pain for ease with household chores(Progressing, not met)  4. Pt will demonstrate appropriate upright posture without external cueing for ease with work related activities. (Progressing, not met)  5. Pt to demonstrate improved functional ability with FOTO-Modified Oswestry score <=20/100.(Progressing, not met)    Plan     Plan of care Certification: 1/10/2024 to 4/10/2024.     Continue pt's current POC to improve lumbar pain, ROM, and core strength.     Estefany Strauss, PT

## 2024-02-29 NOTE — TELEPHONE ENCOUNTER
Last Office Visit Info:   The patient's last visit with Johnny Justin MD was on 2/1/2023.    The patient's last visit in current department was on 1/4/2024.        Last CBC Results:   Lab Results   Component Value Date    WBC 9.20 02/20/2023    HGB 14.6 02/20/2023    HCT 42.2 02/20/2023     02/20/2023       Last CMP Results  Lab Results   Component Value Date     02/20/2023    K 4.2 02/20/2023     02/20/2023    CO2 24 02/20/2023    BUN 13 02/20/2023    CREATININE 1.0 02/20/2023    CALCIUM 9.1 02/20/2023    ALBUMIN 4.3 02/20/2023    AST 25 02/20/2023    ALT 23 02/20/2023       Last Lipids  Lab Results   Component Value Date    CHOL 209 (H) 02/01/2023    TRIG 43 02/01/2023    HDL 70 02/01/2023    LDLCALC 130.4 02/01/2023       Last A1C  Lab Results   Component Value Date    HGBA1C 5.2 02/01/2023       Last TSH  Lab Results   Component Value Date    TSH 2.764 02/20/2023             Current Med Refills  Medication List with Changes/Refills   Current Medications    AMLODIPINE (NORVASC) 5 MG TABLET    Take 1 tablet (5 mg total) by mouth once daily.       Start Date: 3/1/2023  End Date: 2/29/2024    BENZONATATE (TESSALON) 100 MG CAPSULE    Take 100 mg by mouth. For cough       Start Date: 10/6/2023 End Date: --    BINAXNOW COVID-19 AG SELF TEST KIT    use as directed       Start Date: 12/15/2022End Date: --    CABERGOLINE (DOSTINEX) 0.5 MG TABLET    Take 0.5 tablets (0.25 mg total) by mouth twice a week.       Start Date: 2/27/2023 End Date: --    CETIRIZINE (ZYRTEC) 10 MG TABLET    Take 10 mg by mouth.       Start Date: 10/6/2023 End Date: --    FLUTICASONE PROPIONATE (FLONASE) 50 MCG/ACTUATION NASAL SPRAY    2 sprays by Each Nostril route. Shake Liquid and use       Start Date: 10/6/2023 End Date: --    GABAPENTIN (NEURONTIN) 100 MG CAPSULE    TAKE 1 CAPSULE(100 MG) BY MOUTH THREE TIMES DAILY       Start Date: 2/20/2024 End Date: --    HYDROCHLOROTHIAZIDE (HYDRODIURIL) 12.5 MG TAB    TAKE 1  TABLET(12.5 MG) BY MOUTH EVERY DAY       Start Date: 6/28/2023 End Date: --    HYOSCYAMINE (ANASPAZ,LEVSIN) 0.125 MG TAB    Take 1 tablet (125 mcg total) by mouth 3 (three) times daily.       Start Date: 5/7/2022  End Date: 3/1/2023    IBUPROFEN (ADVIL,MOTRIN) 800 MG TABLET    Take 1 tablet (800 mg total) by mouth 3 (three) times daily as needed for Pain.       Start Date: 1/4/2024  End Date: --    OLOPATADINE (PATANOL) 0.1 % OPHTHALMIC SOLUTION    INSTILL 1 DROP IN BOTH EYES TWICE DAILY AS NEEDED       Start Date: 10/6/2023 End Date: --    TIZANIDINE (ZANAFLEX) 4 MG TABLET    TAKE 1 TABLET(4 MG) BY MOUTH EVERY NIGHT AS NEEDED FOR MUSCLE SPASMS OR TIGHTNESS       Start Date: 2/14/2024 End Date: --       Order(s) placed per written order guidelines: none    Please advise.

## 2024-03-01 RX ORDER — AMLODIPINE BESYLATE 5 MG/1
5 TABLET ORAL
Qty: 90 TABLET | Refills: 3 | Status: SHIPPED | OUTPATIENT
Start: 2024-03-01

## 2024-03-04 ENCOUNTER — PATIENT MESSAGE (OUTPATIENT)
Dept: PRIMARY CARE CLINIC | Facility: CLINIC | Age: 39
End: 2024-03-04
Payer: COMMERCIAL

## 2024-03-08 ENCOUNTER — PATIENT MESSAGE (OUTPATIENT)
Dept: PRIMARY CARE CLINIC | Facility: CLINIC | Age: 39
End: 2024-03-08
Payer: COMMERCIAL

## 2024-03-12 DIAGNOSIS — M54.41 ACUTE RIGHT-SIDED LOW BACK PAIN WITH RIGHT-SIDED SCIATICA: ICD-10-CM

## 2024-03-12 NOTE — TELEPHONE ENCOUNTER
No care due was identified.  [facility] Embedded Care Due Messages. Reference number: [id number].   3/12/2024 3:17:04 AM CDT

## 2024-03-13 RX ORDER — TIZANIDINE 4 MG/1
TABLET ORAL
Qty: 20 TABLET | Refills: 0 | Status: SHIPPED | OUTPATIENT
Start: 2024-03-13 | End: 2024-04-01

## 2024-03-22 ENCOUNTER — OFFICE VISIT (OUTPATIENT)
Dept: FAMILY MEDICINE | Facility: CLINIC | Age: 39
End: 2024-03-22
Payer: COMMERCIAL

## 2024-03-22 ENCOUNTER — HOSPITAL ENCOUNTER (OUTPATIENT)
Dept: RADIOLOGY | Facility: HOSPITAL | Age: 39
Discharge: HOME | End: 2024-03-22
Attending: FAMILY MEDICINE
Payer: COMMERCIAL

## 2024-03-22 VITALS
HEART RATE: 79 BPM | HEIGHT: 68 IN | RESPIRATION RATE: 16 BRPM | TEMPERATURE: 98 F | BODY MASS INDEX: 38.52 KG/M2 | DIASTOLIC BLOOD PRESSURE: 98 MMHG | SYSTOLIC BLOOD PRESSURE: 140 MMHG | WEIGHT: 254.19 LBS | OXYGEN SATURATION: 98 %

## 2024-03-22 DIAGNOSIS — M70.71 BURSITIS OF RIGHT HIP, UNSPECIFIED BURSA: ICD-10-CM

## 2024-03-22 DIAGNOSIS — M54.16 LUMBAR RADICULOPATHY: Primary | ICD-10-CM

## 2024-03-22 DIAGNOSIS — M54.16 LUMBAR RADICULOPATHY: ICD-10-CM

## 2024-03-22 PROCEDURE — 99214 OFFICE O/P EST MOD 30 MIN: CPT | Mod: 25,S$GLB,, | Performed by: FAMILY MEDICINE

## 2024-03-22 PROCEDURE — 72114 X-RAY EXAM L-S SPINE BENDING: CPT | Mod: 26,,, | Performed by: RADIOLOGY

## 2024-03-22 PROCEDURE — 1159F MED LIST DOCD IN RCRD: CPT | Mod: CPTII,S$GLB,, | Performed by: FAMILY MEDICINE

## 2024-03-22 PROCEDURE — 3077F SYST BP >= 140 MM HG: CPT | Mod: CPTII,S$GLB,, | Performed by: FAMILY MEDICINE

## 2024-03-22 PROCEDURE — 3080F DIAST BP >= 90 MM HG: CPT | Mod: CPTII,S$GLB,, | Performed by: FAMILY MEDICINE

## 2024-03-22 PROCEDURE — 3008F BODY MASS INDEX DOCD: CPT | Mod: CPTII,S$GLB,, | Performed by: FAMILY MEDICINE

## 2024-03-22 PROCEDURE — 99999 PR PBB SHADOW E&M-EST. PATIENT-LVL V: CPT | Mod: PBBFAC,,, | Performed by: FAMILY MEDICINE

## 2024-03-22 PROCEDURE — 96372 THER/PROPH/DIAG INJ SC/IM: CPT | Mod: S$GLB,,, | Performed by: FAMILY MEDICINE

## 2024-03-22 PROCEDURE — 72114 X-RAY EXAM L-S SPINE BENDING: CPT | Mod: TC,FY

## 2024-03-22 RX ORDER — PREDNISONE 10 MG/1
TABLET ORAL
Qty: 21 TABLET | Refills: 0 | Status: SHIPPED | OUTPATIENT
Start: 2024-03-22 | End: 2024-04-05

## 2024-03-22 RX ORDER — AZELASTINE 1 MG/ML
SPRAY, METERED NASAL
COMMUNITY
Start: 2024-02-02

## 2024-03-22 RX ORDER — AMOXICILLIN 500 MG/1
500 TABLET, FILM COATED ORAL 2 TIMES DAILY
COMMUNITY
Start: 2024-02-02

## 2024-03-22 RX ORDER — METHYLPREDNISOLONE SODIUM SUCCINATE 125 MG/2ML
125 INJECTION INTRAMUSCULAR; INTRAVENOUS
Status: COMPLETED | OUTPATIENT
Start: 2024-03-22 | End: 2024-03-22

## 2024-03-22 RX ADMIN — METHYLPREDNISOLONE SODIUM SUCCINATE 125 MG: 125 INJECTION INTRAMUSCULAR; INTRAVENOUS at 07:03

## 2024-03-22 NOTE — LETTER
March 22, 2024      [facility]  [address] [place] [address]  Phone: [phone]  Fax: [phone]       Patient: Rodríguez Graves   YOB: 1985  Date of Visit: 03/22/2024    To Whom It May Concern:    Nemesio Graves  was at [facility] on 03/22/2024. The patient may return to work/school on 3/28/24. If you have any questions or concerns, or if I can be of further assistance, please do not hesitate to contact me.    Sincerely,      Johnny Justin MD

## 2024-03-22 NOTE — PROGRESS NOTES
Subjective:       Patient ID: Rodríguez Graves is a 38 y.o. male.    Chief Complaint: Back Pain      Back Pain      38-year-old male presents for back pain.  States he has had pretty consistent back pain for last 4 months.  Feels his medicines have not helped.  States the muscle relaxer helps a night but continues daytime.  States the pain can be severe after work.    Review of Systems   Constitutional: Negative.    HENT: Negative.     Respiratory: Negative.     Cardiovascular: Negative.    Gastrointestinal: Negative.    Endocrine: Negative.    Genitourinary: Negative.    Musculoskeletal:  Positive for back pain.   Neurological: Negative.    Psychiatric/Behavioral: Negative.            Past Medical History:   Diagnosis Date    Anxiety     Depression     Essential hypertension 11/11/2021    Infertility male      Past Surgical History:   Procedure Laterality Date    Urecthrotomy      WISDOM TOOTH EXTRACTION      2 extracted      Family History   Problem Relation Age of Onset    Hypertension Mother      Social History     Socioeconomic History    Marital status:    Tobacco Use    Smoking status: Former     Types: Cigarettes     Start date: 6/27/2009    Smokeless tobacco: Never   Substance and Sexual Activity    Alcohol use: Yes     Comment: occacionally    Drug use: No    Sexual activity: Yes     Partners: Female     Birth control/protection: None     Comment: 11/6/17  with same partner 10 years        Current Outpatient Medications:     amLODIPine (NORVASC) 5 MG tablet, TAKE 1 TABLET(5 MG) BY MOUTH EVERY DAY, Disp: 90 tablet, Rfl: 3    azelastine (ASTELIN) 137 mcg (0.1 %) nasal spray, , Disp: , Rfl:     cetirizine (ZYRTEC) 10 MG tablet, Take 10 mg by mouth., Disp: , Rfl:     fluticasone propionate (FLONASE) 50 mcg/actuation nasal spray, 2 sprays by Each Nostril route. Shake Liquid and use, Disp: , Rfl:     gabapentin (NEURONTIN) 100 MG capsule, TAKE 1 CAPSULE(100 MG) BY MOUTH THREE TIMES DAILY, Disp: 60  "capsule, Rfl: 0    ibuprofen (ADVIL,MOTRIN) 800 MG tablet, Take 1 tablet (800 mg total) by mouth 3 (three) times daily as needed for Pain., Disp: 30 tablet, Rfl: 0    olopatadine (PATANOL) 0.1 % ophthalmic solution, INSTILL 1 DROP IN BOTH EYES TWICE DAILY AS NEEDED, Disp: , Rfl:     tiZANidine (ZANAFLEX) 4 MG tablet, TAKE 1 TABLET(4 MG) BY MOUTH EVERY NIGHT AS NEEDED FOR MUSCLE SPASMS OR TIGHTNESS, Disp: 20 tablet, Rfl: 0    amoxicillin (AMOXIL) 500 MG Tab, Take 500 mg by mouth 2 (two) times daily., Disp: , Rfl:     benzonatate (TESSALON) 100 MG capsule, Take 100 mg by mouth. For cough, Disp: , Rfl:     BINAXNOW COVID-19 AG SELF TEST Kit, use as directed, Disp: , Rfl:     cabergoline (DOSTINEX) 0.5 mg tablet, Take 0.5 tablets (0.25 mg total) by mouth twice a week. (Patient not taking: Reported on 1/4/2024), Disp: 30 tablet, Rfl: 2    hydroCHLOROthiazide (HYDRODIURIL) 12.5 MG Tab, TAKE 1 TABLET(12.5 MG) BY MOUTH EVERY DAY (Patient not taking: Reported on 1/4/2024), Disp: 90 tablet, Rfl: 1    hyoscyamine (ANASPAZ,LEVSIN) 0.125 mg Tab, Take 1 tablet (125 mcg total) by mouth 3 (three) times daily. (Patient not taking: Reported on 1/4/2024), Disp: 20 tablet, Rfl: 0    predniSONE (DELTASONE) 10 MG tablet, Take 2 tablets (20 mg total) by mouth once daily for 7 days, THEN 1 tablet (10 mg total) once daily for 7 days., Disp: 21 tablet, Rfl: 0  No current facility-administered medications for this visit.   Objective:      Vitals:    03/22/24 0701 03/22/24 0721   BP: (!) 140/92 (!) 140/98   BP Location: Left arm    Patient Position: Sitting    BP Method: Medium (Manual)    Pulse: 79    Resp: 16    Temp: 98.4 °F (36.9 °C)    TempSrc: Oral    SpO2: 98%    Weight: 115.3 kg (254 lb 3.1 oz)    Height: 5' 8" (1.727 m)        Physical Exam  Constitutional:       General: He is not in acute distress.     Appearance: He is not diaphoretic.   HENT:      Head: Normocephalic and atraumatic.   Eyes:      Conjunctiva/sclera: Conjunctivae " normal.   Pulmonary:      Effort: Pulmonary effort is normal.   Musculoskeletal:         General: Tenderness (lumbar TTP. pos sitting straight leg test) present.      Cervical back: Neck supple.   Skin:     Findings: No rash.   Neurological:      Mental Status: He is alert and oriented to person, place, and time.   Psychiatric:         Behavior: Behavior normal.         Thought Content: Thought content normal.         Judgment: Judgment normal.            Assessment:       1. Lumbar radiculopathy    2. Bursitis of right hip, unspecified bursa        Plan:       Lumbar radiculopathy  -     X-Ray Lumbar Complete Including Flex And Ext; Future; Expected date: 03/22/2024  -     Ambulatory referral/consult to Orthopedics; Future; Expected date: 03/29/2024  -     methylPREDNISolone sodium succinate injection 125 mg  -     predniSONE (DELTASONE) 10 MG tablet; Take 2 tablets (20 mg total) by mouth once daily for 7 days, THEN 1 tablet (10 mg total) once daily for 7 days.  Dispense: 21 tablet; Refill: 0    Bursitis of right hip, unspecified bursa  -     Ambulatory referral/consult to Orthopedics; Future; Expected date: 03/29/2024  -     methylPREDNISolone sodium succinate injection 125 mg  -     predniSONE (DELTASONE) 10 MG tablet; Take 2 tablets (20 mg total) by mouth once daily for 7 days, THEN 1 tablet (10 mg total) once daily for 7 days.  Dispense: 21 tablet; Refill: 0    For patient Ortho.  Follow-up x-ray.  Given 2 weeks of steroid taper.  Follow-up then.          Future Appointments   Date Time Provider Department Center   4/12/2024 11:20 AM Johnny Justin MD [facility] [place]       Patient note was created using [employer].  Any errors in syntax or even information may not have been identified and edited on initial review prior to signing this note.

## 2024-03-22 NOTE — PROGRESS NOTES
Administered SOlu - Medrol 125 mg IM to right ventrogluteal.  Patient tolerated injection well, no adverse reactions noted.

## 2024-03-25 ENCOUNTER — PATIENT MESSAGE (OUTPATIENT)
Dept: FAMILY MEDICINE | Facility: CLINIC | Age: 39
End: 2024-03-25
Payer: COMMERCIAL

## 2024-03-25 NOTE — LETTER
March 27, 2024      Levine, Susan. \[facility].\""  [address] [place] [address]  Phone: [phone]  Fax: [phone]       Patient: Rodríguez Graves   YOB: 1985  Date of Visit: 03/27/2024    To Whom It May Concern:    Nemesio Graves  was at [facility] on 03/22/2024. The patient may return to work/school on 4/2/24. If you have any questions or concerns, or if I can be of further assistance, please do not hesitate to contact me.     Sincerely,        Johnny Justin MD

## 2024-03-28 ENCOUNTER — PATIENT MESSAGE (OUTPATIENT)
Dept: ADMINISTRATIVE | Facility: HOSPITAL | Age: 39
End: 2024-03-28
Payer: COMMERCIAL

## 2024-03-30 DIAGNOSIS — M54.41 ACUTE RIGHT-SIDED LOW BACK PAIN WITH RIGHT-SIDED SCIATICA: ICD-10-CM

## 2024-03-30 NOTE — TELEPHONE ENCOUNTER
No care due was identified.  [facility] Embedded Care Due Messages. Reference number: [id number].   3/30/2024 6:19:18 AM CDT

## 2024-04-01 RX ORDER — TIZANIDINE 4 MG/1
TABLET ORAL
Qty: 20 TABLET | Refills: 0 | Status: SHIPPED | OUTPATIENT
Start: 2024-04-01 | End: 2024-04-12 | Stop reason: SDUPTHER

## 2024-04-12 ENCOUNTER — OFFICE VISIT (OUTPATIENT)
Dept: FAMILY MEDICINE | Facility: CLINIC | Age: 39
End: 2024-04-12
Payer: COMMERCIAL

## 2024-04-12 VITALS
HEART RATE: 72 BPM | DIASTOLIC BLOOD PRESSURE: 88 MMHG | OXYGEN SATURATION: 98 % | HEIGHT: 68 IN | TEMPERATURE: 98 F | SYSTOLIC BLOOD PRESSURE: 128 MMHG | WEIGHT: 254.63 LBS | RESPIRATION RATE: 16 BRPM | BODY MASS INDEX: 38.59 KG/M2

## 2024-04-12 DIAGNOSIS — M54.50 LUMBAR PAIN: Primary | ICD-10-CM

## 2024-04-12 DIAGNOSIS — M70.71 BURSITIS OF RIGHT HIP, UNSPECIFIED BURSA: ICD-10-CM

## 2024-04-12 DIAGNOSIS — M54.16 LUMBAR RADICULOPATHY: ICD-10-CM

## 2024-04-12 DIAGNOSIS — M54.41 ACUTE RIGHT-SIDED LOW BACK PAIN WITH RIGHT-SIDED SCIATICA: ICD-10-CM

## 2024-04-12 PROCEDURE — 3079F DIAST BP 80-89 MM HG: CPT | Mod: CPTII,S$GLB,, | Performed by: FAMILY MEDICINE

## 2024-04-12 PROCEDURE — 1159F MED LIST DOCD IN RCRD: CPT | Mod: CPTII,S$GLB,, | Performed by: FAMILY MEDICINE

## 2024-04-12 PROCEDURE — 3074F SYST BP LT 130 MM HG: CPT | Mod: CPTII,S$GLB,, | Performed by: FAMILY MEDICINE

## 2024-04-12 PROCEDURE — 99214 OFFICE O/P EST MOD 30 MIN: CPT | Mod: S$GLB,,, | Performed by: FAMILY MEDICINE

## 2024-04-12 PROCEDURE — 99999 PR PBB SHADOW E&M-EST. PATIENT-LVL III: CPT | Mod: PBBFAC,,, | Performed by: FAMILY MEDICINE

## 2024-04-12 PROCEDURE — 3008F BODY MASS INDEX DOCD: CPT | Mod: CPTII,S$GLB,, | Performed by: FAMILY MEDICINE

## 2024-04-12 RX ORDER — TIZANIDINE 4 MG/1
4 TABLET ORAL NIGHTLY PRN
Qty: 30 TABLET | Refills: 5 | Status: SHIPPED | OUTPATIENT
Start: 2024-04-12

## 2024-04-12 RX ORDER — NAPROXEN 500 MG/1
500 TABLET ORAL 2 TIMES DAILY PRN
Qty: 60 TABLET | Refills: 5 | Status: SHIPPED | OUTPATIENT
Start: 2024-04-12

## 2024-04-12 NOTE — PROGRESS NOTES
Health Maintenance Due   Topic     Influenza Vaccine (1)     COVID-19 Vaccine (3 - 2023-24 season)

## 2024-05-31 NOTE — PROGRESS NOTES
Subjective:       Patient ID: Rodríguez Graves is a 38 y.o. male.    Chief Complaint: Follow-up      Follow-up      38-year-old male presents for back pain follow-up.  Place medication helped with his pain.  Still has some discomfort but no where near as bad as it previously was.    Review of Systems   Constitutional: Negative.    HENT: Negative.     Respiratory: Negative.     Cardiovascular: Negative.    Gastrointestinal: Negative.    Endocrine: Negative.    Genitourinary: Negative.    Musculoskeletal: Negative.    Neurological: Negative.    Psychiatric/Behavioral: Negative.            Past Medical History:   Diagnosis Date    Anxiety     Depression     Essential hypertension 11/11/2021    Infertility male      Past Surgical History:   Procedure Laterality Date    Urecthrotomy      WISDOM TOOTH EXTRACTION      2 extracted      Family History   Problem Relation Name Age of Onset    Hypertension Mother       Social History     Socioeconomic History    Marital status:    Tobacco Use    Smoking status: Former     Types: Cigarettes     Start date: 6/27/2009    Smokeless tobacco: Never   Substance and Sexual Activity    Alcohol use: Yes     Comment: occacionally    Drug use: No    Sexual activity: Yes     Partners: Female     Birth control/protection: None     Comment: 11/6/17  with same partner 10 years        Current Outpatient Medications:     amLODIPine (NORVASC) 5 MG tablet, TAKE 1 TABLET(5 MG) BY MOUTH EVERY DAY, Disp: 90 tablet, Rfl: 3    azelastine (ASTELIN) 137 mcg (0.1 %) nasal spray, , Disp: , Rfl:     cetirizine (ZYRTEC) 10 MG tablet, Take 10 mg by mouth., Disp: , Rfl:     fluticasone propionate (FLONASE) 50 mcg/actuation nasal spray, 2 sprays by Each Nostril route. Shake Liquid and use, Disp: , Rfl:     gabapentin (NEURONTIN) 100 MG capsule, TAKE 1 CAPSULE(100 MG) BY MOUTH THREE TIMES DAILY, Disp: 60 capsule, Rfl: 0    hydroCHLOROthiazide (HYDRODIURIL) 12.5 MG Tab, TAKE 1 TABLET(12.5 MG) BY MOUTH  "EVERY DAY, Disp: 90 tablet, Rfl: 1    olopatadine (PATANOL) 0.1 % ophthalmic solution, INSTILL 1 DROP IN BOTH EYES TWICE DAILY AS NEEDED, Disp: , Rfl:     amoxicillin (AMOXIL) 500 MG Tab, Take 500 mg by mouth 2 (two) times daily. (Patient not taking: Reported on 4/12/2024), Disp: , Rfl:     benzonatate (TESSALON) 100 MG capsule, Take 100 mg by mouth. For cough (Patient not taking: Reported on 4/12/2024), Disp: , Rfl:     BINAXNOW COVID-19 AG SELF TEST Kit, use as directed (Patient not taking: Reported on 4/12/2024), Disp: , Rfl:     cabergoline (DOSTINEX) 0.5 mg tablet, Take 0.5 tablets (0.25 mg total) by mouth twice a week. (Patient not taking: Reported on 1/4/2024), Disp: 30 tablet, Rfl: 2    hyoscyamine (ANASPAZ,LEVSIN) 0.125 mg Tab, Take 1 tablet (125 mcg total) by mouth 3 (three) times daily. (Patient not taking: Reported on 1/4/2024), Disp: 20 tablet, Rfl: 0    naproxen (NAPROSYN) 500 MG tablet, Take 1 tablet (500 mg total) by mouth 2 (two) times daily as needed., Disp: 60 tablet, Rfl: 5    tiZANidine (ZANAFLEX) 4 MG tablet, Take 1 tablet (4 mg total) by mouth nightly as needed., Disp: 30 tablet, Rfl: 5   Objective:      Vitals:    04/12/24 1038   BP: 128/88   BP Location: Right arm   Patient Position: Sitting   BP Method: Medium (Manual)   Pulse: 72   Resp: 16   Temp: 98.4 °F (36.9 °C)   TempSrc: Oral   SpO2: 98%   Weight: 115.5 kg (254 lb 10.1 oz)   Height: 5' 8" (1.727 m)       Physical Exam  Constitutional:       General: He is not in acute distress.     Appearance: He is not diaphoretic.   HENT:      Head: Normocephalic and atraumatic.   Eyes:      Conjunctiva/sclera: Conjunctivae normal.   Pulmonary:      Effort: Pulmonary effort is normal.   Musculoskeletal:         General: Normal range of motion.      Cervical back: Neck supple.   Skin:     Findings: No rash.   Neurological:      Mental Status: He is alert and oriented to person, place, and time.   Psychiatric:         Behavior: Behavior normal.       "   Thought Content: Thought content normal.         Judgment: Judgment normal.            Assessment:       1. Lumbar pain    2. Acute right-sided low back pain with right-sided sciatica    3. Lumbar radiculopathy    4. Bursitis of right hip, unspecified bursa        Plan:       Lumbar pain  -     naproxen (NAPROSYN) 500 MG tablet; Take 1 tablet (500 mg total) by mouth 2 (two) times daily as needed.  Dispense: 60 tablet; Refill: 5    Acute right-sided low back pain with right-sided sciatica  -     tiZANidine (ZANAFLEX) 4 MG tablet; Take 1 tablet (4 mg total) by mouth nightly as needed.  Dispense: 30 tablet; Refill: 5    Lumbar radiculopathy    Bursitis of right hip, unspecified bursa  -     naproxen (NAPROSYN) 500 MG tablet; Take 1 tablet (500 mg total) by mouth 2 (two) times daily as needed.  Dispense: 60 tablet; Refill: 5    Switch patient to naproxen and muscle relaxers to take as needed.          No future appointments.    Patient note was created using [employer].  Any errors in syntax or even information may not have been identified and edited on initial review prior to signing this note.

## 2024-06-13 ENCOUNTER — CLINICAL SUPPORT (OUTPATIENT)
Dept: OTHER | Facility: CLINIC | Age: 39
End: 2024-06-13
Payer: COMMERCIAL

## 2024-06-13 DIAGNOSIS — Z00.8 ENCOUNTER FOR OTHER GENERAL EXAMINATION: ICD-10-CM

## 2024-06-13 PROCEDURE — 82947 ASSAY GLUCOSE BLOOD QUANT: CPT | Mod: QW,S$GLB,, | Performed by: INTERNAL MEDICINE

## 2024-06-13 PROCEDURE — 80061 LIPID PANEL: CPT | Mod: QW,S$GLB,, | Performed by: INTERNAL MEDICINE

## 2024-06-13 PROCEDURE — 99401 PREV MED CNSL INDIV APPRX 15: CPT | Mod: S$GLB,,, | Performed by: INTERNAL MEDICINE

## 2024-06-14 VITALS
DIASTOLIC BLOOD PRESSURE: 87 MMHG | WEIGHT: 250 LBS | HEIGHT: 67 IN | BODY MASS INDEX: 39.24 KG/M2 | SYSTOLIC BLOOD PRESSURE: 139 MMHG

## 2024-06-14 LAB
HDLC SERPL-MCNC: 92 MG/DL
POC CHOLESTEROL, TOTAL: 191 MG/DL
POC GLUCOSE, FASTING: 90 MG/DL (ref 60–110)
TRIGL SERPL-MCNC: 44 MG/DL

## 2024-08-05 DIAGNOSIS — M54.41 ACUTE RIGHT-SIDED LOW BACK PAIN WITH RIGHT-SIDED SCIATICA: ICD-10-CM

## 2024-08-06 RX ORDER — GABAPENTIN 100 MG/1
100 CAPSULE ORAL 3 TIMES DAILY
Qty: 60 CAPSULE | Refills: 0 | Status: SHIPPED | OUTPATIENT
Start: 2024-08-06

## 2024-08-28 DIAGNOSIS — M54.41 ACUTE RIGHT-SIDED LOW BACK PAIN WITH RIGHT-SIDED SCIATICA: ICD-10-CM

## 2024-08-28 RX ORDER — GABAPENTIN 100 MG/1
100 CAPSULE ORAL 3 TIMES DAILY
Qty: 60 CAPSULE | Refills: 0 | Status: SHIPPED | OUTPATIENT
Start: 2024-08-28

## 2024-08-28 NOTE — TELEPHONE ENCOUNTER
No care due was identified.  [facility] Embedded Care Due Messages. Reference number: [id number].   8/28/2024 3:17:04 AM CDT

## 2024-09-21 DIAGNOSIS — M54.41 ACUTE RIGHT-SIDED LOW BACK PAIN WITH RIGHT-SIDED SCIATICA: ICD-10-CM

## 2024-09-21 NOTE — TELEPHONE ENCOUNTER
No care due was identified.  [facility] Embedded Care Due Messages. Reference number: [id number].   9/21/2024 3:16:36 AM CDT

## 2024-09-23 RX ORDER — GABAPENTIN 100 MG/1
100 CAPSULE ORAL 3 TIMES DAILY
Qty: 60 CAPSULE | Refills: 0 | Status: SHIPPED | OUTPATIENT
Start: 2024-09-23

## 2024-10-16 DIAGNOSIS — M54.41 ACUTE RIGHT-SIDED LOW BACK PAIN WITH RIGHT-SIDED SCIATICA: ICD-10-CM

## 2024-10-16 DIAGNOSIS — M54.50 LUMBAR PAIN: ICD-10-CM

## 2024-10-16 DIAGNOSIS — M70.71 BURSITIS OF RIGHT HIP, UNSPECIFIED BURSA: ICD-10-CM

## 2024-10-16 RX ORDER — TIZANIDINE 4 MG/1
4 TABLET ORAL NIGHTLY PRN
Qty: 30 TABLET | Refills: 0 | Status: SHIPPED | OUTPATIENT
Start: 2024-10-16

## 2024-10-16 RX ORDER — NAPROXEN 500 MG/1
500 TABLET ORAL 2 TIMES DAILY PRN
Qty: 60 TABLET | Refills: 0 | Status: SHIPPED | OUTPATIENT
Start: 2024-10-16

## 2024-10-16 NOTE — TELEPHONE ENCOUNTER
Care Due:                  Date            Visit Type   Department     Provider  --------------------------------------------------------------------------------                                EP -                              PRIMARY      [facility]  Last Visit: 04-      CARE ([facility])   MEDICINE       Johnny Justin  Next Visit: None Scheduled  None         None Found                                                            Last  Test          Frequency    Reason                     Performed    Due Date  --------------------------------------------------------------------------------    CBC.........  12 months..  naproxen.................  02-   02-    CMP.........  12 months..  hydroCHLOROthiazide,       02-   02-                             naproxen.................    [employer] Embedded Care Due Messages. Reference number: [id number].   10/16/2024 3:17:06 AM CDT

## 2024-10-21 DIAGNOSIS — M54.41 ACUTE RIGHT-SIDED LOW BACK PAIN WITH RIGHT-SIDED SCIATICA: ICD-10-CM

## 2024-10-21 NOTE — TELEPHONE ENCOUNTER
No care due was identified.  [facility] Embedded Care Due Messages. Reference number: [id number].   10/21/2024 8:12:59 AM CDT

## 2024-10-23 RX ORDER — GABAPENTIN 100 MG/1
100 CAPSULE ORAL 3 TIMES DAILY
Qty: 60 CAPSULE | Refills: 0 | Status: SHIPPED | OUTPATIENT
Start: 2024-10-23

## 2024-11-02 ENCOUNTER — HOSPITAL ENCOUNTER (EMERGENCY)
Facility: HOSPITAL | Age: 39
Discharge: HOME | End: 2024-11-02
Attending: EMERGENCY MEDICINE
Payer: COMMERCIAL

## 2024-11-02 VITALS
OXYGEN SATURATION: 97 % | DIASTOLIC BLOOD PRESSURE: 82 MMHG | RESPIRATION RATE: 18 BRPM | TEMPERATURE: 98 F | HEART RATE: 70 BPM | SYSTOLIC BLOOD PRESSURE: 138 MMHG

## 2024-11-02 DIAGNOSIS — S60.212A CONTUSION OF LEFT WRIST, INITIAL ENCOUNTER: ICD-10-CM

## 2024-11-02 DIAGNOSIS — S39.012A LUMBAR STRAIN, INITIAL ENCOUNTER: ICD-10-CM

## 2024-11-02 DIAGNOSIS — S80.01XA CONTUSION OF RIGHT KNEE, INITIAL ENCOUNTER: ICD-10-CM
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
## 2024-11-02 PROCEDURE — 25000003 PHARM REV CODE 250

## 2024-11-02 PROCEDURE — 99284 EMERGENCY DEPT VISIT MOD MDM: CPT | Mod: 25

## 2024-11-02 RX ORDER — ACETAMINOPHEN 500 MG
1000 TABLET ORAL
Status: COMPLETED | OUTPATIENT
Start: 2024-11-02 | End: 2024-11-02

## 2024-11-02 RX ORDER — METHOCARBAMOL 500 MG/1
500 TABLET, FILM COATED ORAL 3 TIMES DAILY PRN
Qty: 15 TABLET | Refills: 0 | Status: SHIPPED | OUTPATIENT
Start: 2024-11-02 | End: 2024-11-07

## 2024-11-02 RX ORDER — METHOCARBAMOL 500 MG/1
500 TABLET, FILM COATED ORAL
Status: COMPLETED | OUTPATIENT
Start: 2024-11-02 | End: 2024-11-02

## 2024-11-02 RX ADMIN — METHOCARBAMOL 500 MG: 500 TABLET ORAL at 08:11

## 2024-11-02 RX ADMIN — ACETAMINOPHEN 1000 MG: 500 TABLET ORAL at 08:11

## 2024-11-02 NOTE — Clinical Note
"Rodríguez Bhaktasarah Graves was seen and treated in our emergency department on 11/2/2024.  He may return to work on 11/06/2024.       If you have any questions or concerns, please don't hesitate to call.       LPN    "

## 2024-11-03 NOTE — DISCHARGE INSTRUCTIONS
Thank you for coming to our Emergency Department today. It is important to remember that some problems or medical conditions are difficult to diagnose and may not be found or addressed during your Emergency Department visit.  These conditions often start with non-specific symptoms and can only be diagnosed on follow up visits with your primary care physician or specialist when the symptoms continue or change. Please remember that all medical conditions can change, and we cannot predict how you will be feeling tomorrow or the next day. Return to the ER with any questions/concerns, new/concerning symptoms, worsening or failure to improve.       Be sure to follow up with your primary care doctor and review all labs/imaging/tests that were performed during your ER visit with them. It is very common for us to identify non-emergent incidental findings which must be followed up with your primary care physician.  Some labs/imaging/tests may be outside of the normal range, and require non-emergent follow-up and/or further investigation/treatment/procedures/testing to help diagnose/exclude/prevent complications or other potentially serious medical conditions. Some abnormalities may not have been discussed or addressed during your ER visit.     An ER visit does not replace a primary care visit, and many screening tests or follow-up tests cannot be ordered by an ER doctor or performed by the ER. Some tests may even require pre-approval.    If you do not have a primary care doctor, you may contact the one listed on your discharge paperwork or you may also call the [facility] Appointment Desk at [phone] , or [address] [place] [address] at  [phone] to schedule an appointment, or establish care with a primary care doctor or even a specialist and to obtain information about local resources. It is important to your health that you have a primary care doctor.    Please take all medications as directed. We have done our best to select  a medication for you that will treat your condition however, all medications may potentially have side-effects and it is impossible to predict which medications may give you side-effects or what those side-effects (if any) those medications may give you.  If you feel that you are having a negative effect or side-effect of any medication you should stop taking those medications immediately and seek medical attention. If you feel that you are having a life-threatening reaction call 911.        Do not drive, swim, climb to height, take a bath, operate heavy machinery, drink alcohol or take potentially sedating medications, sign any legal documents or make any important decisions for 24 hours if you have received any pain medications, sedatives or mood altering drugs during your ER visit or within 24 hours of taking them if they have been prescribed to you.     You can find additional resources for Dentists, hearing aids, durable medical equipment, low cost pharmacies and other resources at [email or web address]
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
## 2024-11-14 DIAGNOSIS — M54.41 ACUTE RIGHT-SIDED LOW BACK PAIN WITH RIGHT-SIDED SCIATICA: ICD-10-CM

## 2024-11-14 DIAGNOSIS — M70.71 BURSITIS OF RIGHT HIP, UNSPECIFIED BURSA: ICD-10-CM

## 2024-11-14 DIAGNOSIS — M54.50 LUMBAR PAIN: ICD-10-CM

## 2024-11-14 NOTE — TELEPHONE ENCOUNTER
No care due was identified.  [facility] Embedded Care Due Messages. Reference number: [id number].   11/14/2024 3:17:03 AM CST

## 2024-11-19 RX ORDER — TIZANIDINE 4 MG/1
4 TABLET ORAL NIGHTLY PRN
Qty: 30 TABLET | Refills: 0 | Status: SHIPPED | OUTPATIENT
Start: 2024-11-19

## 2024-11-19 RX ORDER — NAPROXEN 500 MG/1
500 TABLET ORAL 2 TIMES DAILY PRN
Qty: 60 TABLET | Refills: 0 | Status: SHIPPED | OUTPATIENT
Start: 2024-11-19

## 2025-03-08 ENCOUNTER — HOSPITAL ENCOUNTER (EMERGENCY)
Facility: HOSPITAL | Age: 40
Discharge: HOME | End: 2025-03-08
Attending: STUDENT IN AN ORGANIZED HEALTH CARE EDUCATION/TRAINING PROGRAM
Payer: COMMERCIAL

## 2025-03-08 VITALS
OXYGEN SATURATION: 99 % | RESPIRATION RATE: 16 BRPM | HEIGHT: 67 IN | HEART RATE: 68 BPM | DIASTOLIC BLOOD PRESSURE: 81 MMHG | TEMPERATURE: 98 F | BODY MASS INDEX: 35.31 KG/M2 | SYSTOLIC BLOOD PRESSURE: 139 MMHG | WEIGHT: 225 LBS

## 2025-03-08 DIAGNOSIS — M25.512 ACUTE PAIN OF LEFT SHOULDER: Primary | ICD-10-CM

## 2025-03-08 DIAGNOSIS — T14.8XXA MUSCLE STRAIN: ICD-10-CM

## 2025-03-08 DIAGNOSIS — M25.519 SHOULDER PAIN: ICD-10-CM

## 2025-03-08 PROCEDURE — 99284 EMERGENCY DEPT VISIT MOD MDM: CPT | Mod: 25

## 2025-03-08 PROCEDURE — 63600175 PHARM REV CODE 636 W HCPCS: Mod: JZ,TB

## 2025-03-08 PROCEDURE — 96372 THER/PROPH/DIAG INJ SC/IM: CPT

## 2025-03-08 RX ORDER — METHOCARBAMOL 750 MG/1
1500 TABLET, FILM COATED ORAL 3 TIMES DAILY
Qty: 30 TABLET | Refills: 0 | Status: SHIPPED | OUTPATIENT
Start: 2025-03-08 | End: 2025-03-13

## 2025-03-08 RX ORDER — KETOROLAC TROMETHAMINE 30 MG/ML
30 INJECTION, SOLUTION INTRAMUSCULAR; INTRAVENOUS
Status: COMPLETED | OUTPATIENT
Start: 2025-03-08 | End: 2025-03-08

## 2025-03-08 RX ORDER — MELOXICAM 7.5 MG/1
7.5 TABLET ORAL DAILY
Qty: 12 TABLET | Refills: 0 | Status: SHIPPED | OUTPATIENT
Start: 2025-03-08

## 2025-03-08 RX ORDER — LIDOCAINE 50 MG/G
1 PATCH TOPICAL DAILY
Qty: 9 PATCH | Refills: 0 | Status: SHIPPED | OUTPATIENT
Start: 2025-03-08

## 2025-03-08 RX ADMIN — KETOROLAC TROMETHAMINE 30 MG: 30 INJECTION, SOLUTION INTRAMUSCULAR; INTRAVENOUS at 04:03

## 2025-03-08 NOTE — Clinical Note
"Rodríguez"Gigi Graves was seen and treated in our emergency department on 3/8/2025.  He may return to work on 03/11/2025.       If you have any questions or concerns, please don't hesitate to call.      Federico Perez PA-C"

## 2025-03-08 NOTE — DISCHARGE INSTRUCTIONS
Take medications as prescribed.  Follow up with orthopedic doctor and physical therapy if symptoms do not resolve or worsen.  Do not take methocarbamol (Robaxin) with gabapentin.  This is a sedating drug.  I recommend taking it before bed.  Do not drive or operate heavy machinery while taking this drug.  Thank you for coming to our Emergency Department today. It is important to remember that some problems or medical conditions are difficult to diagnose and may not be found or addressed during your Emergency Department visit.  These conditions often start with non-specific symptoms and can only be diagnosed on follow up visits with your primary care physician or specialist when the symptoms continue or change. Please remember that all medical conditions can change, and we cannot predict how you will be feeling tomorrow or the next day. Return to the ER with any questions/concerns, new/concerning symptoms, worsening or failure to improve.       Be sure to follow up with your primary care doctor and review all labs/imaging/tests that were performed during your ER visit with them. It is very common for us to identify non-emergent incidental findings which must be followed up with your primary care physician.  Some labs/imaging/tests may be outside of the normal range, and require non-emergent follow-up and/or further investigation/treatment/procedures/testing to help diagnose/exclude/prevent complications or other potentially serious medical conditions. Some abnormalities may not have been discussed or addressed during your ER visit. Some lab results may not return during your ER visit but can be accessible by downloading the free [facility] kathie or by visiting [email or web address] . It is important for you to review all labs/imaging/tests which are outside of the normal range with your physician.    An ER visit does not replace a primary care visit, and many screening tests or follow-up tests cannot be ordered  by an ER doctor or performed by the ER. Some tests may even require pre-approval.    If you do not have a primary care doctor, you may contact the one listed on your discharge paperwork or you may also call the [facility] Appointment Desk at [phone] , or [address] [place] [address] at  [phone] to schedule an appointment, or establish care with a primary care doctor or even a specialist and to obtain information about local resources. It is important to your health that you have a primary care doctor.    Please take all medications as directed. We have done our best to select a medication for you that will treat your condition however, all medications may potentially have side-effects and it is impossible to predict which medications may give you side-effects or what those side-effects (if any) those medications may give you.  If you feel that you are having a negative effect or side-effect of any medication you should stop taking those medications immediately and seek medical attention. If you feel that you are having a life-threatening reaction call 911.        Do not drive, swim, climb to height, take a bath, operate heavy machinery, drink alcohol or take potentially sedating medications, sign any legal documents or make any important decisions for 24 hours if you have received any pain medications, sedatives or mood altering drugs during your ER visit or within 24 hours of taking them if they have been prescribed to you.     You can find additional resources for Dentists, hearing aids, durable medical equipment, low cost pharmacies and other resources at [email or web address]

## 2025-03-08 NOTE — ED PROVIDER NOTES
Encounter Date: 3/8/2025    SCRIBE #1 NOTE: I, Kalpana Dejesus, am scribing for, and in the presence of,  Federico Palafox PA-C. I have scribed the following portions of the note - Other sections scribed: HPI/ROS.       History     Chief Complaint   Patient presents with    Shoulder Pain     40 yo male to triage for left shoulder pain after doing some housework last night. VSS, NAD, AAox4     Patient is a 39 y.o. male, with a PMHx of HTN, arthritis in back, who presents to the ED with left shoulder pain onset last night after doing housework. No trauma to area.  Patient states his job also includes heavy lifting and straining of the shoulder muscles.  Pt notes increased pain with movement. Pt attempted treatment with Tylenol and Gabapentin (last dose 8 am). No other exacerbating or alleviating factors. No blood thinner use. Denies chest pain, shortness of breath or other associated symptoms.       The history is provided by the patient.     Review of patient's allergies indicates:  No Known Allergies  Past Medical History:   Diagnosis Date    Anxiety     Depression     Essential hypertension 11/11/2021    Infertility male      Past Surgical History:   Procedure Laterality Date    Urecthrotomy      WISDOM TOOTH EXTRACTION      2 extracted      Family History   Problem Relation Name Age of Onset    Hypertension Mother       Social History[1]  Review of Systems   Constitutional:  Negative for chills and fever.   HENT:  Negative for congestion and sore throat.    Respiratory:  Negative for cough and shortness of breath.    Cardiovascular:  Negative for chest pain.   Gastrointestinal:  Negative for abdominal pain, diarrhea, nausea and vomiting.   Genitourinary:  Negative for dysuria.   Musculoskeletal:  Positive for arthralgias (left shoulder). Negative for back pain.   Skin:  Negative for rash.   Neurological:  Negative for dizziness, syncope and weakness.       Physical Exam     Initial Vitals [03/08/25 1554]   BP Pulse  Resp Temp SpO2   139/81 68 16 97.9 °F (36.6 °C) 99 %      MAP       --         Physical Exam    Constitutional: He appears well-developed and well-nourished.  Non-toxic appearance. He does not have a sickly appearance. No distress.   HENT:   Head: Normocephalic and atraumatic.   Right Ear: External ear normal.   Left Ear: External ear normal. Mouth/Throat: No trismus in the jaw.   Eyes: Conjunctivae and lids are normal.   Neck: Trachea normal. Neck supple. No tracheal deviation present.   Normal range of motion.   Full passive range of motion without pain.     Cardiovascular:  Normal rate, regular rhythm, S1 normal, S2 normal, normal heart sounds and normal pulses.     Exam reveals no S3 and no S4.       Pulmonary/Chest: Effort normal and breath sounds normal. No tachypnea and no bradypnea. No respiratory distress. He has no decreased breath sounds. He has no wheezes. He has no rhonchi. He has no rales.   Abdominal: He exhibits no distension.   Musculoskeletal:      Right shoulder: No swelling, deformity, tenderness or bony tenderness. Normal range of motion. Normal strength.      Left shoulder: Tenderness and bony tenderness present. No swelling or deformity. Normal range of motion. Normal strength.      Right upper arm: No swelling, tenderness or bony tenderness.      Left upper arm: No swelling, tenderness or bony tenderness.      Right elbow: No swelling. Normal range of motion. No tenderness.      Left elbow: No swelling. Normal range of motion. No tenderness.      Right forearm: No swelling, tenderness or bony tenderness.      Left forearm: No swelling, tenderness or bony tenderness.      Right wrist: No swelling, tenderness or snuff box tenderness. Normal range of motion.      Left wrist: No swelling, tenderness or snuff box tenderness. Normal range of motion.      Cervical back: Full passive range of motion without pain, normal range of motion and neck supple.      Comments: Negative drop-arm test  Patient  admits to pain in the left shoulder during empty can test.  No weakness or drop arm of the left shoulder     Neurological: He is alert and oriented to person, place, and time. No sensory deficit. GCS eye subscore is 4. GCS verbal subscore is 5. GCS motor subscore is 6.   Skin: Skin is warm and dry. Capillary refill takes less than 2 seconds. No rash noted.   Psychiatric: He has a normal mood and affect. His behavior is normal. Judgment and thought content normal.         ED Course   Procedures  Labs Reviewed - No data to display       Imaging Results              X-Ray Shoulder Trauma Left (Final result)  Result time 03/08/25 16:59:02      Final result by Paul Green MD (03/08/25 16:59:02)                   Impression:      1. No acute displaced fracture or dislocation of the left shoulder.      Electronically signed by: Paul Green MD  Date:    03/08/2025  Time:    16:59               Narrative:    EXAMINATION:  XR SHOULDER TRAUMA 3 VIEW LEFT    CLINICAL HISTORY:  Pain in unspecified shoulder    TECHNIQUE:  Three views of the left shoulder were performed.    COMPARISON  None    FINDINGS:  Three views left shoulder.    The left humeral head maintains appropriate relationship with the glenoid.  The acromioclavicular joint is intact.  No acute displaced left rib fracture.  The left lung zones are clear.                                       Medications   ketorolac injection 30 mg (30 mg Intramuscular Given 3/8/25 1632)     Medical Decision Making  Encounter Date: 3/8/2025    39 y.o. male presents for evaluation of left shoulder pain.  Hemodynamically stable. Afebrile. Phonating and protecting the airway spontaneously. No clinical evidence for cardiovascular instability or impending airway compromise.  Remainder of physical exam as above.   Prior medical records reviewed. PMD note reviewed. Current co-morbidities considered that will impact clinical decision making include as above.   Vitals range:   Temp:   [97.9 °F (36.6 °C)] 97.9 °F (36.6 °C)  Pulse:  [68] 68  Resp:  [16] 16  SpO2:  [99 %] 99 %  BP: (139)/(81) 139/81    Patient denies other injury, wrist pain, forearm pain, fever, and numbness/tingling. Afebrile. Patient is non-toxic appearing and in no acute distress. Xray shows no acute fracture or dislocation.      Negative drop-arm test  Patient admits to pain in the left shoulder during empty can test.  No weakness or drop arm of the left shoulder.  I recommended follow up with orthopedic doctor.   No physical exam findings, Hx, or significant risk factors to suggest DVT.  Patient has full range of motion in the shoulder joint.  Given the above, I have also considered but doubt vascular injury, septic joint, abscess/cellulitis, gout, wrist injury, neck injury.    Pain controlled in ED. Discharged home with supportive care. I discussed RICE treatment with the patient and issued the patient an educational handout on shoulder injuries.  I advised patient to refrain from the use of gabapentin while taking Robaxin.  Instructed to follow up with orthopedics for further evaluation and management of symptoms.     Clinical picture today most consistent with muscle strain.   Doubt alternate pathology as listed in the differential above.    ED MEDS GIVEN:  Medications  ketorolac injection 30 mg (30 mg Intramuscular Given 3/8/25 1632)    Clinical Impression:  Final diagnoses:  [M25.519] Shoulder pain  [M25.512] Acute pain of left shoulder (Primary)  [T14.8XXA] Muscle strain    I discussed with the patient/family the diagnosis, treatment plan, indications for return to the emergency department, and for expected follow-up. The patient/family verbalized an understanding. The patient/family is asked if there are any questions or concerns. We discuss the case, until all issues are addressed to the patient/family's satisfaction. Patient/family understands and is agreeable to the plan.   Federico Perez    DISCLAIMER: This note  was prepared with [employer] voice recognition transcription software. Garbled syntax, mangled pronouns, and other bizarre constructions may be attributed to that software system.      Amount and/or Complexity of Data Reviewed  Radiology: ordered. Decision-making details documented in ED Course.    Risk  Prescription drug management.            Scribe Attestation:   Scribe #1: I performed the above scribed service and the documentation accurately describes the services I performed. I attest to the accuracy of the note.                           I, Federico Perez PA-C, personally performed the services described in this documentation. All medical record entries made by the scribe were at my direction and in my presence. I have reviewed the chart and agree that the record reflects my personal performance and is accurate and complete.      DISCLAIMER: This note was prepared with [employer] voice recognition transcription software. Garbled syntax, mangled pronouns, and other bizarre constructions may be attributed to that software system.        Clinical Impression:  Final diagnoses:  [M25.519] Shoulder pain  [M25.512] Acute pain of left shoulder (Primary)  [T14.8XXA] Muscle strain          ED Disposition Condition    Discharge Stable          ED Prescriptions       Medication Sig Dispense Start Date End Date Auth. Provider    methocarbamoL (ROBAXIN) 750 MG Tab Take 2 tablets (1,500 mg total) by mouth 3 (three) times daily. for 5 days 30 tablet 3/8/2025 3/13/2025 Federico Perez PA-C    LIDOcaine (LIDODERM) 5 % Place 1 patch onto the skin once daily. Remove & Discard patch within 12 hours or as directed by MD 9 patch 3/8/2025 -- Federico Perez PA-C    meloxicam (MOBIC) 7.5 MG tablet Take 1 tablet (7.5 mg total) by mouth once daily. 12 tablet 3/8/2025 -- Federico Perez PA-C          Follow-up Information       Follow up With Specialties Details Why Contact [facility] Emergency Dept Emergency Medicine Go to   As needed, If symptoms worsen [address] [facility] [place] [address]  [phone]    [facility] Physical Therapy Schedule an appointment as soon as possible for a visit in 1 day For further evaluation, more definitive management of symptoms [address] [facility] [place] [id number] [phone]    Cullen Salcedo MD Orthopedic Surgery Schedule an appointment as soon as possible for a visit in 1 day For further evaluation, more definitive management of symptoms [id number]  [facility] [address]  [phone]                   [1]   Social History  Tobacco Use    Smoking status: Former     Types: Cigarettes     Start date: 6/27/2009    Smokeless tobacco: Never   Substance Use Topics    Alcohol use: Yes     Comment: occacionally    Drug use: No        Federico Perez PA-C  03/08/25 2223

## 2025-04-17 ENCOUNTER — OFFICE VISIT (OUTPATIENT)
Dept: FAMILY MEDICINE | Facility: CLINIC | Age: 40
End: 2025-04-17
Payer: COMMERCIAL

## 2025-04-17 VITALS
SYSTOLIC BLOOD PRESSURE: 150 MMHG | RESPIRATION RATE: 18 BRPM | HEART RATE: 69 BPM | DIASTOLIC BLOOD PRESSURE: 110 MMHG | TEMPERATURE: 99 F | WEIGHT: 230.81 LBS | HEIGHT: 67 IN | OXYGEN SATURATION: 98 % | BODY MASS INDEX: 36.22 KG/M2

## 2025-04-17 DIAGNOSIS — M25.512 CHRONIC LEFT SHOULDER PAIN: Primary | ICD-10-CM

## 2025-04-17 DIAGNOSIS — I10 PRIMARY HYPERTENSION: ICD-10-CM

## 2025-04-17 DIAGNOSIS — G89.29 CHRONIC LEFT SHOULDER PAIN: Primary | ICD-10-CM

## 2025-04-17 DIAGNOSIS — E66.01 SEVERE OBESITY (BMI 35.0-39.9) WITH COMORBIDITY: ICD-10-CM

## 2025-04-17 DIAGNOSIS — H43.11 VITREOUS HEMORRHAGE, RIGHT EYE: ICD-10-CM

## 2025-04-17 DIAGNOSIS — F11.20 OPIOID DEPENDENCE, UNCOMPLICATED: ICD-10-CM

## 2025-04-17 PROCEDURE — 99999 PR PBB SHADOW E&M-EST. PATIENT-LVL V: CPT | Mod: PBBFAC,,,

## 2025-04-17 RX ORDER — TRAMADOL HYDROCHLORIDE 50 MG/1
50 TABLET ORAL EVERY 8 HOURS PRN
COMMUNITY
Start: 2025-03-06

## 2025-04-17 RX ORDER — AMLODIPINE BESYLATE 5 MG/1
5 TABLET ORAL DAILY
Qty: 90 TABLET | Refills: 1 | Status: SHIPPED | OUTPATIENT
Start: 2025-04-17 | End: 2025-10-14

## 2025-04-17 RX ORDER — TRIAMCINOLONE ACETONIDE 40 MG/ML
60 INJECTION, SUSPENSION INTRA-ARTICULAR; INTRAMUSCULAR
Status: COMPLETED | OUTPATIENT
Start: 2025-04-17 | End: 2025-04-17

## 2025-04-17 RX ADMIN — TRIAMCINOLONE ACETONIDE 60 MG: 40 INJECTION, SUSPENSION INTRA-ARTICULAR; INTRAMUSCULAR at 02:04

## 2025-04-17 NOTE — PROGRESS NOTES
Family Medicine     Patient name: Rodríguez Graves  MRN: [id number]  : 1985  PCP NAME: No primary care provider on file.    Active Problem List with Overview Notes    Diagnosis Date Noted    Severe obesity (BMI 35.0-39.9) with comorbidity 2025    Opioid dependence, uncomplicated 2025    Vitreous hemorrhage, right eye 2025    Lumbosacral radiculitis 2024    Abnormal endocrine laboratory test finding 2023    Infertility male 2023    Dizziness 2023    Disorder of pituitary gland, unspecified 2023    Chest pain 2021    Essential hypertension 2021    Elevated prolactin level 2021    High serum follicle stimulating hormone (FSH) 2021    Snoring 2021    Erectile dysfunction due to arterial insufficiency 2021    Testicular failure 10/07/2019    Urethral stricture 2018    Obesity (BMI 35.0-39.9 without comorbidity) 2016       History of Present Illness    Patient presents today for bilateral shoulder pain    He presents with bilateral shoulder pain that began in February, with left shoulder currently being more symptomatic. Initial injury occurred while getting dressed, specifically when pulling on pants. The right shoulder was subsequently injured while attempting to loosen a flakito bolt on a washer. He experiences shoulder  pain with movement, particularly when driving, pulling, pushing, reaching behind the back, brushing teeth, and tying an apron. He denies pain at rest. He was treated with meloxicam in March with no current refills remaining. He works as a      He has hypertension diagnosed following a right eye hemorrhage that occurred a couple years ago. He also reports history of lower back issues.    He currently takes amlodipine for blood pressure control. He discontinued hydrochlorothiazide due to severe dehydration at work. He uses muscle relaxers and Tramadol for back pain.    He works as a  ".      ROS:  General: -fever, -chills, -fatigue, -weight gain, -weight loss  Eyes: -vision changes, -redness, -discharge  ENT: -ear pain, -nasal congestion, -sore throat  Cardiovascular: -chest pain, -palpitations, -lower extremity edema  Respiratory: -cough, -shortness of breath  Gastrointestinal: -abdominal pain, -nausea, -vomiting, -diarrhea, -constipation, -blood in stool  Genitourinary: -dysuria, -hematuria, -frequency  Musculoskeletal: +joint pain, -muscle pain, +pain with movement, +limb pain  Skin: -rash, -lesion  Neurological: -headache, -dizziness, -numbness, -tingling  Psychiatric: -anxiety, -depression, -sleep difficulty          Past Medical History:   Diagnosis Date    Anxiety     Depression     Essential hypertension 11/11/2021    Infertility male        Past Surgical History:   Procedure Laterality Date    Urecthrotomy      WISDOM TOOTH EXTRACTION      2 extracted         Family History   Problem Relation Name Age of Onset    Hypertension Mother          Social History     Socioeconomic History    Marital status:    Tobacco Use    Smoking status: Former     Types: Cigarettes     Start date: 6/27/2009    Smokeless tobacco: Never   Substance and Sexual Activity    Alcohol use: Yes     Comment: occacionally    Drug use: No    Sexual activity: Yes     Partners: Female     Birth control/protection: None     Comment: 11/6/17  with same partner 10 years        BP (!) 150/110   Pulse 69   Temp 98.6 °F (37 °C) (Oral)   Resp 18   Ht 5' 7" (1.702 m)   Wt 104.7 kg (230 lb 13.2 oz)   SpO2 98%   BMI 36.15 kg/m²     Physical Exam    General: No acute distress. Well-developed. Well-nourished.  Eyes: EOMI. Sclerae anicteric.  HENT: Normocephalic. Atraumatic. Nares patent. Moist oral mucosa.  Ears: Bilateral TMs clear. Bilateral EACs clear.  Cardiovascular: Regular rate. Regular rhythm. No murmurs. No rubs. No gallops. Normal S1, S2.  Respiratory: Normal respiratory effort. Clear to " auscultation bilaterally. No rales. No rhonchi. No wheezing.  Abdomen: Soft. Non-tender. Non-distended. Normoactive bowel sounds.  Musculoskeletal: No  obvious deformity. No pain on palpation of shoulder.  Extremities: No lower extremity edema.  Neurological: Alert & oriented x3. No slurred speech. Normal gait.  Psychiatric: Normal mood. Normal affect. Good insight. Good judgment.  Skin: Warm. Dry. No rash.            Assessment & Plan        IMPRESSION:  - Bilateral shoulder pain since February, worsening with movement and affecting daily activities.  - XR shows no bony damage, but MRI needed to evaluate muscles and ligaments.  - Considered and administered steroid injection for temporary relief of inflammation, explained it does not address underlying cause.  - Discussed importance of identifying root cause of shoulder pain for long-term management.         Rodríguez was seen today for follow-up and establish care.    Diagnoses and all orders for this visit:    Chronic left shoulder pain  Examined the shoulder and noted pain with movement but not at rest. No bony damage noted on x-ray.  Recommend MRI for better imaging.  Refer to ortho.  Steroid injection today  -     MRI Shoulder Without Contrast Left; Future  -     triamcinolone acetonide injection 60 mg  -     Ambulatory referral/consult to Orthopedics; Future    Primary hypertension  Blood pressure is elevated today.  Likely due to noncompliance with medicine.  Amlodipine refilled.  Blood pressure check in 2 weeks.  -     amLODIPine (NORVASC) 5 MG tablet; Take 1 tablet (5 mg total) by mouth once daily.    Severe obesity (BMI 35.0-39.9) with comorbidity    Opioid dependence, uncomplicated    Vitreous hemorrhage, right eye  - Noted patient's history of retinal hemorrhage in the right eye from a few years ago.  - Acknowledged previous clearance by ophthalmologist.  - Recommend scheduling a follow-up with ophthalmologist, as patient has not had recent evaluation.      OCCUPATIONAL FACTORS:  - Acknowledged patient's occupation as a , which involves regular use of shoulders and repetitive movements.  - Recognized that the shoulder pain affects the patient's livelihood.  - Considered occupational factors in overall treatment plan for shoulder pain.     Problem List Items Addressed This Visit       Severe obesity (BMI 35.0-39.9) with comorbidity    Opioid dependence, uncomplicated    Vitreous hemorrhage, right eye     Other Visit Diagnoses         Chronic left shoulder pain    -  Primary    Relevant Medications    triamcinolone acetonide injection 60 mg (Completed)    Other Relevant Orders    MRI Shoulder Without Contrast Left    Ambulatory referral/consult to Orthopedics      Primary hypertension        Relevant Medications    amLODIPine (NORVASC) 5 MG tablet              Medication List with Changes/Refills   Current Medications    AMOXICILLIN (AMOXIL) 500 MG TAB    Take 500 mg by mouth 2 (two) times daily.    AZELASTINE (ASTELIN) 137 MCG (0.1 %) NASAL SPRAY        BENZONATATE (TESSALON) 100 MG CAPSULE    Take 100 mg by mouth. For cough    BINAXNOW COVID-19 AG SELF TEST KIT    use as directed    CABERGOLINE (DOSTINEX) 0.5 MG TABLET    Take 0.5 tablets (0.25 mg total) by mouth twice a week.    CETIRIZINE (ZYRTEC) 10 MG TABLET    Take 10 mg by mouth.    FLUTICASONE PROPIONATE (FLONASE) 50 MCG/ACTUATION NASAL SPRAY    2 sprays by Each Nostril route. Shake Liquid and use    GABAPENTIN (NEURONTIN) 100 MG CAPSULE    Take 1 capsule (100 mg total) by mouth 3 (three) times daily.    HYOSCYAMINE (ANASPAZ,LEVSIN) 0.125 MG TAB    Take 1 tablet (125 mcg total) by mouth 3 (three) times daily.    LIDOCAINE (LIDODERM) 5 %    Place 1 patch onto the skin once daily. Remove & Discard patch within 12 hours or as directed by MD    MELOXICAM (MOBIC) 7.5 MG TABLET    Take 1 tablet (7.5 mg total) by mouth once daily.    NAPROXEN (NAPROSYN) 500 MG TABLET    TAKE 1 TABLET(500 MG) BY MOUTH TWICE DAILY  AS NEEDED    OLOPATADINE (PATANOL) 0.1 % OPHTHALMIC SOLUTION    INSTILL 1 DROP IN BOTH EYES TWICE DAILY AS NEEDED    TIZANIDINE (ZANAFLEX) 4 MG TABLET    TAKE 1 TABLET(4 MG) BY MOUTH EVERY NIGHT AS NEEDED    TRAMADOL (ULTRAM) 50 MG TABLET    Take 50 mg by mouth every 8 (eight) hours as needed.   Changed and/or Refilled Medications    Modified Medication Previous Medication    AMLODIPINE (NORVASC) 5 MG TABLET amLODIPine (NORVASC) 5 MG tablet       Take 1 tablet (5 mg total) by mouth once daily.    TAKE 1 TABLET(5 MG) BY MOUTH EVERY DAY   Discontinued Medications    HYDROCHLOROTHIAZIDE (HYDRODIURIL) 12.5 MG TAB    TAKE 1 TABLET(12.5 MG) BY MOUTH EVERY DAY         Follow up in about 2 weeks (around 5/1/2025).    Vlad Christianson MD        This note was generated with the assistance of ambient listening technology. Verbal consent was obtained by the patient and accompanying visitor(s) for the recording of patient appointment to facilitate this note. I attest to having reviewed and edited the generated note for accuracy, though some syntax or spelling errors may persist. Please contact the author of this note for any clarification.

## 2025-04-30 NOTE — PROGRESS NOTES
Assessment: 39 y.o. male with left cuff dysfunction    I explained my diagnostic impression and the reasoning behind it in detail, using layman's terms.     Plan:   Assessment & Plan    - Meloxicam 15 mg daily for 2 weeks, then as needed.  - PT referral placed - if unable to attend can do exercises at home - reviewed video program  - Follow up in 12 weeks.     This note was generated with the assistance of ambient listening technology. Verbal consent was obtained by the patient and accompanying visitor(s) for the recording of patient appointment to facilitate this note. I attest to having reviewed and edited the generated note for accuracy, though some syntax or spelling errors may persist. Please contact the author of this note for any clarification.      All questions were answered in detail. The patient is in full agreement with the treatment plan and will proceed accordingly.    Chief Complaint   Patient presents with    Left Shoulder - Pain       Initial visit (5/12/25): Rodríguez Graves is a 39 y.o. male who presents today complaining of left shoulder pain    Rodríguez presents with left shoulder pain that began in February or March while doing work around the house. He initially thought he had strained a muscle, as there was no fall or specific injury. Pain has persisted since then, bothering him particularly at work where he uses his arms frequently. He reports significant pain when reaching behind his back, turning the steering wheel while driving, pushing or pulling with force, or carrying heavy objects. He also has some discomfort when reaching overhead. Pain runs up towards his chest and sometimes on the side of his shoulder. Both shoulders bother him, but the left is worse.    He visited the ER the day after the pain started, around March 7th. He was prescribed meloxicam, which was not helpful. He has been using tramadol, which he already takes for back pain, as well as Tylenol and muscle relaxers, but these  have not provided significant relief. He received an IM steroid injection which was not helpful    He does not recall history of dislocation    An MRI was performed on the left shoulder, which showed some abnormalities and he was referred to me for follow up     No radicular symptoms    Previously attempted treatments:   Activity modification:  not helpful   NSAIDs: not helpful   Tylenol: not helpful   Tramadol and gabapentin (for back pain) not helpful   PT:  not attempted   Injections: not attempted     This patient was seen in consultation at the request of Dr. Vlad Mehta*    This is the extent of the patient's complaints at this time.      Review of patient's allergies indicates:  No Known Allergies      Physical Exam:   Vitals:    05/12/25 0810   PainSc:   5   PainLoc: Shoulder     General: Patient is alert, awake and oriented to time, place and person. Mood and affect are appropriate.  Patient does not appear to be in any distress, denies any constitutional symptoms and appears stated age.   HEENT: Pupils are equal and round, sclera are not injected. External examination of ears and nose reveals no abnormalities. Cranial nerves II-X are grossly intact  Neck: examination demonstrates painless  active range of motion. Spurling's sign is negative  Skin: no rashes, abrasions or open wounds on the affected extremity   Resp: No respiratory distress or audible wheezing   CV: 2+  pulses, all extremities warm and well perfused   Left Shoulder    Shoulder Range of Motion    Right     Left   (Active/Passive)       Forward Elevation     170/175            170/175  \External rotation (arm at side)  5/050             50/50   Internal rotation behind the back  L3             L3     Range of motion is painful     Scapular winging no  Scapular dyskinesia mild    Examination of the back shows no atrophy     Acromioclavicular joint is tender  Crossbody test: negative    Neer's negative  Hawkin's positive    Agapito's  positive  Drop arm negative  Belly press negative     Cuff Strength     Right     Left   Supraspinatus        5/5    5/5  Infraspinatus     5/5    5/5  Subscapularis     5/5    5/5    Deltoid testing            5/5    5/5    Speeds negative  Yergasons negative    Elbow examination demonstrates no tenderness to palpation and has normal range of motion.     ltsi C5-T1  + epl, io, fds, fdp   2+ RP      Imaging:  3 views of the left shoulder:  negative for degenerative changes of the AC joint. The humeral head is well centered on the AP and axillary views.  No cortical changes of the greater tuberosity noted. No significant degenerative change of the glenohumeral joint or posterior subluxation of the humeral head. No acute changes or fracture.      MRI shows tendinosis of IS, posterior labral tearing, AC arthrosis with edema in distal clavicle, hill sachs deformity    I personally reviewed and interpreted the patient's imaging obtained prior to visit      A note notifying Dr. Vlad Mehta* of my findings was sent via the electronic medical record     This note was created by combination of typed  and M-Modal dictation. Transcription and phonetic errors may be present.  If there are any questions, please contact me.    Current Medications[1]    Past Medical History:   Diagnosis Date    Anxiety     Depression     Essential hypertension 11/11/2021    Infertility male        Active Problem List with Overview Notes    Diagnosis Date Noted    Severe obesity (BMI 35.0-39.9) with comorbidity 04/17/2025    Opioid dependence, uncomplicated 04/17/2025    Vitreous hemorrhage, right eye 04/17/2025    Lumbosacral radiculitis 01/12/2024    Abnormal endocrine laboratory test finding 02/27/2023    Infertility male 02/27/2023    Dizziness 02/27/2023    Disorder of pituitary gland, unspecified 02/27/2023    Chest pain 11/11/2021    Essential hypertension 11/11/2021    Elevated prolactin level 03/11/2021    High serum  follicle stimulating hormone (FSH) 03/11/2021    Snoring 03/11/2021    Erectile dysfunction due to arterial insufficiency 03/04/2021    Testicular failure 10/07/2019    Urethral stricture 01/19/2018    Obesity (BMI 35.0-39.9 without comorbidity) 06/27/2016       Past Surgical History:   Procedure Laterality Date    Urecthrotomy      WISDOM TOOTH EXTRACTION      2 extracted        Social History[2]              [1]   Current Outpatient Medications:     amLODIPine (NORVASC) 5 MG tablet, Take 1 tablet (5 mg total) by mouth once daily., Disp: 90 tablet, Rfl: 1    amoxicillin (AMOXIL) 500 MG Tab, Take 500 mg by mouth 2 (two) times daily., Disp: , Rfl:     azelastine (ASTELIN) 137 mcg (0.1 %) nasal spray, , Disp: , Rfl:     benzonatate (TESSALON) 100 MG capsule, Take 100 mg by mouth. For cough, Disp: , Rfl:     BINAXNOW COVID-19 AG SELF TEST Kit, use as directed, Disp: , Rfl:     cabergoline (DOSTINEX) 0.5 mg tablet, Take 0.5 tablets (0.25 mg total) by mouth twice a week., Disp: 30 tablet, Rfl: 2    cetirizine (ZYRTEC) 10 MG tablet, Take 10 mg by mouth., Disp: , Rfl:     fluticasone propionate (FLONASE) 50 mcg/actuation nasal spray, 2 sprays by Each Nostril route. Shake Liquid and use, Disp: , Rfl:     gabapentin (NEURONTIN) 100 MG capsule, Take 1 capsule (100 mg total) by mouth 3 (three) times daily., Disp: 60 capsule, Rfl: 0    LIDOcaine (LIDODERM) 5 %, Place 1 patch onto the skin once daily. Remove & Discard patch within 12 hours or as directed by MD, Disp: 9 patch, Rfl: 0    naproxen (NAPROSYN) 500 MG tablet, TAKE 1 TABLET(500 MG) BY MOUTH TWICE DAILY AS NEEDED, Disp: 60 tablet, Rfl: 0    olopatadine (PATANOL) 0.1 % ophthalmic solution, INSTILL 1 DROP IN BOTH EYES TWICE DAILY AS NEEDED, Disp: , Rfl:     tiZANidine (ZANAFLEX) 4 MG tablet, TAKE 1 TABLET(4 MG) BY MOUTH EVERY NIGHT AS NEEDED, Disp: 30 tablet, Rfl: 0    traMADoL (ULTRAM) 50 mg tablet, Take 50 mg by mouth every 8 (eight) hours as needed., Disp: , Rfl:      hyoscyamine (ANASPAZ,LEVSIN) 0.125 mg Tab, Take 1 tablet (125 mcg total) by mouth 3 (three) times daily. (Patient not taking: Reported on 1/4/2024), Disp: 20 tablet, Rfl: 0    meloxicam (MOBIC) 15 MG tablet, Take 1 tablet (15 mg total) by mouth once daily., Disp: 30 tablet, Rfl: 0  [2]   Social History  Socioeconomic History    Marital status:    Tobacco Use    Smoking status: Former     Types: Cigarettes     Start date: 6/27/2009    Smokeless tobacco: Never   Substance and Sexual Activity    Alcohol use: Yes     Comment: occacionally    Drug use: No    Sexual activity: Yes     Partners: Female     Birth control/protection: None     Comment: 11/6/17  with same partner 10 years

## 2025-05-01 ENCOUNTER — CLINICAL SUPPORT (OUTPATIENT)
Dept: FAMILY MEDICINE | Facility: CLINIC | Age: 40
End: 2025-05-01
Payer: COMMERCIAL

## 2025-05-01 VITALS — SYSTOLIC BLOOD PRESSURE: 130 MMHG | DIASTOLIC BLOOD PRESSURE: 88 MMHG

## 2025-05-01 DIAGNOSIS — I10 ESSENTIAL HYPERTENSION: Primary | ICD-10-CM

## 2025-05-01 PROCEDURE — 99499 UNLISTED E&M SERVICE: CPT | Mod: S$GLB,,,

## 2025-05-01 PROCEDURE — 99999 PR PBB SHADOW E&M-EST. PATIENT-LVL II: CPT | Mod: PBBFAC,,,

## 2025-05-01 NOTE — PROGRESS NOTES
Rodríguez Graves 39 y.o. male is here today for Blood Pressure check.   History of HTN yes.    Review of patient's allergies indicates:  No Known Allergies  Creatinine   Date Value Ref Range Status   02/20/2023 1.0 0.5 - 1.4 mg/dL Final     Sodium   Date Value Ref Range Status   02/20/2023 138 136 - 145 mmol/L Final     Potassium   Date Value Ref Range Status   02/20/2023 4.2 3.5 - 5.1 mmol/L Final   ]  Patient verifies taking blood pressure medications on a regular basis at the same time of the day.   Current Medications[1]  Does patient have record of home blood pressure readings no. Readings have been averaging  - no readings.   Last dose of blood pressure medication was taken at 5/1/2025@10:00 am.  Patient is asymptomatic.   Complains of  -no complaints.    136/96 right arm, sitting, manual   130/88  right arm, sitting, manual       Dr. Christianson informed of nurse visit.  Patient advised to continue current medication regimen.         [1]   Current Outpatient Medications:     amLODIPine (NORVASC) 5 MG tablet, Take 1 tablet (5 mg total) by mouth once daily., Disp: 90 tablet, Rfl: 1    amoxicillin (AMOXIL) 500 MG Tab, Take 500 mg by mouth 2 (two) times daily. (Patient not taking: Reported on 4/12/2024), Disp: , Rfl:     azelastine (ASTELIN) 137 mcg (0.1 %) nasal spray, , Disp: , Rfl:     benzonatate (TESSALON) 100 MG capsule, Take 100 mg by mouth. For cough (Patient not taking: Reported on 4/12/2024), Disp: , Rfl:     BINAXNOW COVID-19 AG SELF TEST Kit, use as directed (Patient not taking: Reported on 4/12/2024), Disp: , Rfl:     cabergoline (DOSTINEX) 0.5 mg tablet, Take 0.5 tablets (0.25 mg total) by mouth twice a week. (Patient not taking: Reported on 1/4/2024), Disp: 30 tablet, Rfl: 2    cetirizine (ZYRTEC) 10 MG tablet, Take 10 mg by mouth., Disp: , Rfl:     fluticasone propionate (FLONASE) 50 mcg/actuation nasal spray, 2 sprays by Each Nostril route. Shake Liquid and use, Disp: , Rfl:     gabapentin (NEURONTIN)  100 MG capsule, Take 1 capsule (100 mg total) by mouth 3 (three) times daily., Disp: 60 capsule, Rfl: 0    hyoscyamine (ANASPAZ,LEVSIN) 0.125 mg Tab, Take 1 tablet (125 mcg total) by mouth 3 (three) times daily. (Patient not taking: Reported on 1/4/2024), Disp: 20 tablet, Rfl: 0    LIDOcaine (LIDODERM) 5 %, Place 1 patch onto the skin once daily. Remove & Discard patch within 12 hours or as directed by MD, Disp: 9 patch, Rfl: 0    meloxicam (MOBIC) 7.5 MG tablet, Take 1 tablet (7.5 mg total) by mouth once daily., Disp: 12 tablet, Rfl: 0    naproxen (NAPROSYN) 500 MG tablet, TAKE 1 TABLET(500 MG) BY MOUTH TWICE DAILY AS NEEDED, Disp: 60 tablet, Rfl: 0    olopatadine (PATANOL) 0.1 % ophthalmic solution, INSTILL 1 DROP IN BOTH EYES TWICE DAILY AS NEEDED, Disp: , Rfl:     tiZANidine (ZANAFLEX) 4 MG tablet, TAKE 1 TABLET(4 MG) BY MOUTH EVERY NIGHT AS NEEDED, Disp: 30 tablet, Rfl: 0    traMADoL (ULTRAM) 50 mg tablet, Take 50 mg by mouth every 8 (eight) hours as needed., Disp: , Rfl:

## 2025-05-05 ENCOUNTER — HOSPITAL ENCOUNTER (OUTPATIENT)
Dept: RADIOLOGY | Facility: HOSPITAL | Age: 40
Discharge: HOME | End: 2025-05-05
Payer: COMMERCIAL

## 2025-05-05 DIAGNOSIS — M25.512 CHRONIC LEFT SHOULDER PAIN: ICD-10-CM

## 2025-05-05 DIAGNOSIS — G89.29 CHRONIC LEFT SHOULDER PAIN: ICD-10-CM

## 2025-05-05 PROCEDURE — 73221 MRI JOINT UPR EXTREM W/O DYE: CPT | Mod: 26,LT,, | Performed by: RADIOLOGY

## 2025-05-05 PROCEDURE — 73221 MRI JOINT UPR EXTREM W/O DYE: CPT | Mod: TC,LT

## 2025-05-06 ENCOUNTER — PATIENT MESSAGE (OUTPATIENT)
Dept: FAMILY MEDICINE | Facility: CLINIC | Age: 40
End: 2025-05-06
Payer: COMMERCIAL

## 2025-05-06 DIAGNOSIS — M25.512 CHRONIC LEFT SHOULDER PAIN: Primary | ICD-10-CM

## 2025-05-06 DIAGNOSIS — G89.29 CHRONIC LEFT SHOULDER PAIN: Primary | ICD-10-CM

## 2025-05-06 NOTE — LETTER
May 6, 2025      [facility]  [address] [place] [address]  Phone: [phone]  Fax: [phone]       Patient: Rodríguez Graves   YOB: 1985  Date of Visit: 05/06/2025    To Whom It May Concern:    Rodríguez Graves  was at [facility] on 05/06/2025. The patient may return to work on 05/13/2025 with no restrictions. If you have any questions or concerns, or if I can be of further assistance, please do not hesitate to contact me.      Sincerely,        Vlad Christianson MD

## 2025-05-07 DIAGNOSIS — I10 ESSENTIAL HYPERTENSION: ICD-10-CM

## 2025-05-07 NOTE — TELEPHONE ENCOUNTER
Chronic shoulder pain not responsive to NSAIDs, opioids and gabapentin.    MRI of his left shoulder shows  severe internal derangement.     Symptoms likely aggravated by  prolonged shoulder use at his job.     Recommend cessation of aggravating activity until reviewed by Orthopedic surgery.    Vlad Christianson MD

## 2025-05-12 ENCOUNTER — OFFICE VISIT (OUTPATIENT)
Dept: ORTHOPEDICS | Facility: CLINIC | Age: 40
End: 2025-05-12
Payer: COMMERCIAL

## 2025-05-12 DIAGNOSIS — M25.512 CHRONIC LEFT SHOULDER PAIN: Primary | ICD-10-CM

## 2025-05-12 DIAGNOSIS — G89.29 CHRONIC LEFT SHOULDER PAIN: Primary | ICD-10-CM

## 2025-05-12 PROCEDURE — 99999 PR PBB SHADOW E&M-EST. PATIENT-LVL IV: CPT | Mod: PBBFAC,,, | Performed by: ORTHOPAEDIC SURGERY

## 2025-05-12 PROCEDURE — 1160F RVW MEDS BY RX/DR IN RCRD: CPT | Mod: CPTII,S$GLB,, | Performed by: ORTHOPAEDIC SURGERY

## 2025-05-12 PROCEDURE — 1159F MED LIST DOCD IN RCRD: CPT | Mod: CPTII,S$GLB,, | Performed by: ORTHOPAEDIC SURGERY

## 2025-05-12 PROCEDURE — 99204 OFFICE O/P NEW MOD 45 MIN: CPT | Mod: S$GLB,,, | Performed by: ORTHOPAEDIC SURGERY

## 2025-05-12 RX ORDER — MELOXICAM 15 MG/1
15 TABLET ORAL DAILY
Qty: 30 TABLET | Refills: 0 | Status: SHIPPED | OUTPATIENT
Start: 2025-05-12

## 2025-06-09 RX ORDER — MELOXICAM 15 MG/1
TABLET ORAL
Qty: 30 TABLET | Refills: 0 | Status: SHIPPED | OUTPATIENT
Start: 2025-06-09

## 2025-07-09 ENCOUNTER — TELEPHONE (OUTPATIENT)
Dept: ORTHOPEDICS | Facility: CLINIC | Age: 40
End: 2025-07-09
Payer: COMMERCIAL

## 2025-07-09 NOTE — TELEPHONE ENCOUNTER
I called pt to reschedule his appt on August 4th as the provider will not be in clinic this day no answer LVM to reschedule appt.

## 2025-07-15 RX ORDER — MELOXICAM 15 MG/1
TABLET ORAL
Qty: 30 TABLET | Refills: 0 | Status: SHIPPED | OUTPATIENT
Start: 2025-07-15

## 2025-08-18 ENCOUNTER — OFFICE VISIT (OUTPATIENT)
Dept: ORTHOPEDICS | Facility: CLINIC | Age: 40
End: 2025-08-18
Payer: COMMERCIAL

## 2025-08-18 VITALS — WEIGHT: 230.81 LBS | BODY MASS INDEX: 36.22 KG/M2 | HEIGHT: 67 IN

## 2025-08-18 DIAGNOSIS — M25.512 CHRONIC LEFT SHOULDER PAIN: Primary | ICD-10-CM

## 2025-08-18 DIAGNOSIS — G89.29 CHRONIC LEFT SHOULDER PAIN: Primary | ICD-10-CM

## 2025-08-18 PROCEDURE — 1159F MED LIST DOCD IN RCRD: CPT | Mod: CPTII,S$GLB,, | Performed by: ORTHOPAEDIC SURGERY

## 2025-08-18 PROCEDURE — 20610 DRAIN/INJ JOINT/BURSA W/O US: CPT | Mod: LT,S$GLB,, | Performed by: ORTHOPAEDIC SURGERY

## 2025-08-18 PROCEDURE — 3008F BODY MASS INDEX DOCD: CPT | Mod: CPTII,S$GLB,, | Performed by: ORTHOPAEDIC SURGERY

## 2025-08-18 PROCEDURE — 1160F RVW MEDS BY RX/DR IN RCRD: CPT | Mod: CPTII,S$GLB,, | Performed by: ORTHOPAEDIC SURGERY

## 2025-08-18 PROCEDURE — 99213 OFFICE O/P EST LOW 20 MIN: CPT | Mod: 25,S$GLB,, | Performed by: ORTHOPAEDIC SURGERY

## 2025-08-18 PROCEDURE — 99999 PR PBB SHADOW E&M-EST. PATIENT-LVL III: CPT | Mod: PBBFAC,,, | Performed by: ORTHOPAEDIC SURGERY

## 2025-08-18 RX ORDER — TRIAMCINOLONE ACETONIDE 40 MG/ML
40 INJECTION, SUSPENSION INTRA-ARTICULAR; INTRAMUSCULAR
Status: DISCONTINUED | OUTPATIENT
Start: 2025-08-18 | End: 2025-08-18 | Stop reason: HOSPADM

## 2025-08-18 RX ORDER — MELOXICAM 15 MG/1
TABLET ORAL
Qty: 30 TABLET | Refills: 0 | Status: SHIPPED | OUTPATIENT
Start: 2025-08-18

## 2025-08-18 RX ADMIN — TRIAMCINOLONE ACETONIDE 40 MG: 40 INJECTION, SUSPENSION INTRA-ARTICULAR; INTRAMUSCULAR at 07:08

## (undated) DEVICE — KNIFE DISPOSABLE COLD STG

## (undated) DEVICE — PACK CYSTO

## (undated) DEVICE — SET CYSTO IRRIGATION UNIV SPIK

## (undated) DEVICE — Device

## (undated) DEVICE — BAG URINARY DRAINAGE 2000ML

## (undated) DEVICE — SOL IRR WATER STRL 3000 ML

## (undated) DEVICE — SYR 10CC LUER LOCK

## (undated) DEVICE — WIRE GUIDE 0.038OLD

## (undated) DEVICE — TRAY CYSTO BASIN